# Patient Record
Sex: FEMALE | Race: WHITE | NOT HISPANIC OR LATINO | Employment: FULL TIME | URBAN - METROPOLITAN AREA
[De-identification: names, ages, dates, MRNs, and addresses within clinical notes are randomized per-mention and may not be internally consistent; named-entity substitution may affect disease eponyms.]

---

## 2021-02-06 ENCOUNTER — OFFICE VISIT (OUTPATIENT)
Dept: FAMILY MEDICINE CLINIC | Facility: CLINIC | Age: 51
End: 2021-02-06

## 2021-02-06 VITALS
BODY MASS INDEX: 47.09 KG/M2 | OXYGEN SATURATION: 99 % | DIASTOLIC BLOOD PRESSURE: 88 MMHG | WEIGHT: 293 LBS | SYSTOLIC BLOOD PRESSURE: 148 MMHG | HEIGHT: 66 IN | HEART RATE: 83 BPM | TEMPERATURE: 98.2 F | RESPIRATION RATE: 22 BRPM

## 2021-02-06 DIAGNOSIS — I25.10 CARDIOVASCULAR DISEASE: Chronic | ICD-10-CM

## 2021-02-06 DIAGNOSIS — E06.3 HYPOTHYROIDISM DUE TO HASHIMOTO'S THYROIDITIS: Chronic | ICD-10-CM

## 2021-02-06 DIAGNOSIS — Z11.59 NEED FOR HEPATITIS C SCREENING TEST: ICD-10-CM

## 2021-02-06 DIAGNOSIS — F42.9 OBSESSIVE-COMPULSIVE DISORDER, UNSPECIFIED TYPE: Chronic | ICD-10-CM

## 2021-02-06 DIAGNOSIS — E28.2 PCOS (POLYCYSTIC OVARIAN SYNDROME): Chronic | ICD-10-CM

## 2021-02-06 DIAGNOSIS — Z00.00 ANNUAL PHYSICAL EXAM: ICD-10-CM

## 2021-02-06 DIAGNOSIS — Z00.00 ENCOUNTER FOR MEDICAL EXAMINATION TO ESTABLISH CARE: Primary | ICD-10-CM

## 2021-02-06 DIAGNOSIS — I10 ESSENTIAL HYPERTENSION: Chronic | ICD-10-CM

## 2021-02-06 DIAGNOSIS — E03.8 HYPOTHYROIDISM DUE TO HASHIMOTO'S THYROIDITIS: Chronic | ICD-10-CM

## 2021-02-06 DIAGNOSIS — Z12.31 ENCOUNTER FOR SCREENING MAMMOGRAM FOR MALIGNANT NEOPLASM OF BREAST: ICD-10-CM

## 2021-02-06 DIAGNOSIS — F32.A DEPRESSION, UNSPECIFIED DEPRESSION TYPE: Chronic | ICD-10-CM

## 2021-02-06 DIAGNOSIS — F41.9 ANXIETY: Chronic | ICD-10-CM

## 2021-02-06 DIAGNOSIS — E66.01 MORBIDLY OBESE (HCC): ICD-10-CM

## 2021-02-06 DIAGNOSIS — K57.90 DIVERTICULOSIS: Chronic | ICD-10-CM

## 2021-02-06 LAB
BILIRUB BLD-MCNC: NEGATIVE MG/DL
CLARITY, POC: CLEAR
COLOR UR: YELLOW
EXPIRATION DATE: ABNORMAL
GLUCOSE UR STRIP-MCNC: NEGATIVE MG/DL
KETONES UR QL: NEGATIVE
LEUKOCYTE EST, POC: NEGATIVE
Lab: 5042
NITRITE UR-MCNC: NEGATIVE MG/ML
PH UR: 5.5 [PH] (ref 5–8)
PROT UR STRIP-MCNC: ABNORMAL MG/DL
RBC # UR STRIP: ABNORMAL /UL
SP GR UR: 1.02 (ref 1–1.03)
UROBILINOGEN UR QL: NORMAL

## 2021-02-06 PROCEDURE — 85025 COMPLETE CBC W/AUTO DIFF WBC: CPT | Performed by: NURSE PRACTITIONER

## 2021-02-06 PROCEDURE — 81003 URINALYSIS AUTO W/O SCOPE: CPT | Performed by: NURSE PRACTITIONER

## 2021-02-06 PROCEDURE — 80053 COMPREHEN METABOLIC PANEL: CPT | Performed by: NURSE PRACTITIONER

## 2021-02-06 PROCEDURE — 83036 HEMOGLOBIN GLYCOSYLATED A1C: CPT | Performed by: NURSE PRACTITIONER

## 2021-02-06 PROCEDURE — 80061 LIPID PANEL: CPT | Performed by: NURSE PRACTITIONER

## 2021-02-06 PROCEDURE — 86803 HEPATITIS C AB TEST: CPT | Performed by: NURSE PRACTITIONER

## 2021-02-06 PROCEDURE — 84443 ASSAY THYROID STIM HORMONE: CPT | Performed by: NURSE PRACTITIONER

## 2021-02-06 PROCEDURE — 99386 PREV VISIT NEW AGE 40-64: CPT | Performed by: NURSE PRACTITIONER

## 2021-02-06 RX ORDER — METFORMIN HYDROCHLORIDE 500 MG/5ML
SOLUTION ORAL
COMMUNITY
End: 2021-02-07

## 2021-02-06 RX ORDER — IBUPROFEN 800 MG/1
800 TABLET ORAL EVERY 6 HOURS PRN
COMMUNITY
End: 2022-03-18

## 2021-02-06 RX ORDER — ATORVASTATIN CALCIUM 40 MG/1
40 TABLET, FILM COATED ORAL DAILY
COMMUNITY
End: 2021-02-09 | Stop reason: SDUPTHER

## 2021-02-06 RX ORDER — VALSARTAN AND HYDROCHLOROTHIAZIDE 80; 12.5 MG/1; MG/1
1 TABLET, FILM COATED ORAL DAILY
COMMUNITY
Start: 2021-01-20 | End: 2021-02-09 | Stop reason: SDUPTHER

## 2021-02-06 RX ORDER — APIXABAN 5 MG/1
TABLET, FILM COATED ORAL
COMMUNITY
Start: 2021-01-20 | End: 2021-02-18 | Stop reason: SDUPTHER

## 2021-02-06 NOTE — PATIENT INSTRUCTIONS
Health Maintenance, Female  Adopting a healthy lifestyle and getting preventive care are important in promoting health and wellness. Ask your health care provider about:  · The right schedule for you to have regular tests and exams.  · Things you can do on your own to prevent diseases and keep yourself healthy.  What should I know about diet, weight, and exercise?  Eat a healthy diet    · Eat a diet that includes plenty of vegetables, fruits, low-fat dairy products, and lean protein.  · Do not eat a lot of foods that are high in solid fats, added sugars, or sodium.  Maintain a healthy weight  Body mass index (BMI) is used to identify weight problems. It estimates body fat based on height and weight. Your health care provider can help determine your BMI and help you achieve or maintain a healthy weight.  Get regular exercise  Get regular exercise. This is one of the most important things you can do for your health. Most adults should:  · Exercise for at least 150 minutes each week. The exercise should increase your heart rate and make you sweat (moderate-intensity exercise).  · Do strengthening exercises at least twice a week. This is in addition to the moderate-intensity exercise.  · Spend less time sitting. Even light physical activity can be beneficial.  Watch cholesterol and blood lipids  Have your blood tested for lipids and cholesterol at 20 years of age, then have this test every 5 years.  Have your cholesterol levels checked more often if:  · Your lipid or cholesterol levels are high.  · You are older than 40 years of age.  · You are at high risk for heart disease.  What should I know about cancer screening?  Depending on your health history and family history, you may need to have cancer screening at various ages. This may include screening for:  · Breast cancer.  · Cervical cancer.  · Colorectal cancer.  · Skin cancer.  · Lung cancer.  What should I know about heart disease, diabetes, and high blood  pressure?  Blood pressure and heart disease  · High blood pressure causes heart disease and increases the risk of stroke. This is more likely to develop in people who have high blood pressure readings, are of  descent, or are overweight.  · Have your blood pressure checked:  ? Every 3-5 years if you are 18-39 years of age.  ? Every year if you are 40 years old or older.  Diabetes  Have regular diabetes screenings. This checks your fasting blood sugar level. Have the screening done:  · Once every three years after age 40 if you are at a normal weight and have a low risk for diabetes.  · More often and at a younger age if you are overweight or have a high risk for diabetes.  What should I know about preventing infection?  Hepatitis B  If you have a higher risk for hepatitis B, you should be screened for this virus. Talk with your health care provider to find out if you are at risk for hepatitis B infection.  Hepatitis C  Testing is recommended for:  · Everyone born from 1945 through 1965.  · Anyone with known risk factors for hepatitis C.  Sexually transmitted infections (STIs)  · Get screened for STIs, including gonorrhea and chlamydia, if:  ? You are sexually active and are younger than 24 years of age.  ? You are older than 24 years of age and your health care provider tells you that you are at risk for this type of infection.  ? Your sexual activity has changed since you were last screened, and you are at increased risk for chlamydia or gonorrhea. Ask your health care provider if you are at risk.  · Ask your health care provider about whether you are at high risk for HIV. Your health care provider may recommend a prescription medicine to help prevent HIV infection. If you choose to take medicine to prevent HIV, you should first get tested for HIV. You should then be tested every 3 months for as long as you are taking the medicine.  Pregnancy  · If you are about to stop having your period (premenopausal) and  you may become pregnant, seek counseling before you get pregnant.  · Take 400 to 800 micrograms (mcg) of folic acid every day if you become pregnant.  · Ask for birth control (contraception) if you want to prevent pregnancy.  Osteoporosis and menopause  Osteoporosis is a disease in which the bones lose minerals and strength with aging. This can result in bone fractures. If you are 65 years old or older, or if you are at risk for osteoporosis and fractures, ask your health care provider if you should:  · Be screened for bone loss.  · Take a calcium or vitamin D supplement to lower your risk of fractures.  · Be given hormone replacement therapy (HRT) to treat symptoms of menopause.  Follow these instructions at home:  Lifestyle  · Do not use any products that contain nicotine or tobacco, such as cigarettes, e-cigarettes, and chewing tobacco. If you need help quitting, ask your health care provider.  · Do not use street drugs.  · Do not share needles.  · Ask your health care provider for help if you need support or information about quitting drugs.  Alcohol use  · Do not drink alcohol if:  ? Your health care provider tells you not to drink.  ? You are pregnant, may be pregnant, or are planning to become pregnant.  · If you drink alcohol:  ? Limit how much you use to 0-1 drink a day.  ? Limit intake if you are breastfeeding.  · Be aware of how much alcohol is in your drink. In the [place] one drink equals one 12 oz bottle of beer (355 mL), one 5 oz glass of wine (148 mL), or one 1½ oz glass of hard liquor (44 mL).  General instructions  · Schedule regular health, dental, and eye exams.  · Stay current with your vaccines.  · Tell your health care provider if:  ? You often feel depressed.  ? You have ever been abused or do not feel safe at home.  Summary  · Adopting a healthy lifestyle and getting preventive care are important in promoting health and wellness.  · Follow your health care provider's instructions about healthy  diet, exercising, and getting tested or screened for diseases.  · Follow your health care provider's instructions on monitoring your cholesterol and blood pressure.  This information is not intended to replace advice given to you by your health care provider. Make sure you discuss any questions you have with your health care provider.  Document Revised: 12/11/2019 Document Reviewed: 12/11/2019  [employer] Patient Education © 2020 [employer]    Preventive Care 40-64 Years Old, Female  Preventive care refers to visits with your health care provider and lifestyle choices that can promote health and wellness. This includes:  · A yearly physical exam. This may also be called an annual well check.  · Regular dental visits and eye exams.  · Immunizations.  · Screening for certain conditions.  · Healthy lifestyle choices, such as eating a healthy diet, getting regular exercise, not using drugs or products that contain nicotine and tobacco, and limiting alcohol use.  What can I expect for my preventive care visit?  Physical exam  Your health care provider will check your:  · Height and weight. This may be used to calculate body mass index (BMI), which tells if you are at a healthy weight.  · Heart rate and blood pressure.  · Skin for abnormal spots.  Counseling  Your health care provider may ask you questions about your:  · Alcohol, tobacco, and drug use.  · Emotional well-being.  · Home and relationship well-being.  · Sexual activity.  · Eating habits.  · Work and work environment.  · Method of birth control.  · Menstrual cycle.  · Pregnancy history.  What immunizations do I need?    Influenza (flu) vaccine  · This is recommended every year.  Tetanus, diphtheria, and pertussis (Tdap) vaccine  · You may need a Td booster every 10 years.  Varicella (chickenpox) vaccine  · You may need this if you have not been vaccinated.  Zoster (shingles) vaccine  · You may need this after age 60.  Measles, mumps, and rubella (MMR)  vaccine  · You may need at least one dose of MMR if you were born in 1957 or later. You may also need a second dose.  Pneumococcal conjugate (PCV13) vaccine  · You may need this if you have certain conditions and were not previously vaccinated.  Pneumococcal polysaccharide (PPSV23) vaccine  · You may need one or two doses if you smoke cigarettes or if you have certain conditions.  Meningococcal conjugate (MenACWY) vaccine  · You may need this if you have certain conditions.  Hepatitis A vaccine  · You may need this if you have certain conditions or if you travel or work in places where you may be exposed to hepatitis A.  Hepatitis B vaccine  · You may need this if you have certain conditions or if you travel or work in places where you may be exposed to hepatitis B.  Haemophilus influenzae type b (Hib) vaccine  · You may need this if you have certain conditions.  Human papillomavirus (HPV) vaccine  · If recommended by your health care provider, you may need three doses over 6 months.  You may receive vaccines as individual doses or as more than one vaccine together in one shot (combination vaccines). Talk with your health care provider about the risks and benefits of combination vaccines.  What tests do I need?  Blood tests  · Lipid and cholesterol levels. These may be checked every 5 years, or more frequently if you are over 50 years old.  · Hepatitis C test.  · Hepatitis B test.  Screening  · Lung cancer screening. You may have this screening every year starting at age 55 if you have a 30-pack-year history of smoking and currently smoke or have quit within the past 15 years.  · Colorectal cancer screening. All adults should have this screening starting at age 50 and continuing until age 75. Your health care provider may recommend screening at age 45 if you are at increased risk. You will have tests every 1-10 years, depending on your results and the type of screening test.  · Diabetes screening. This is done by  checking your blood sugar (glucose) after you have not eaten for a while (fasting). You may have this done every 1-3 years.  · Mammogram. This may be done every 1-2 years. Talk with your health care provider about when you should start having regular mammograms. This may depend on whether you have a family history of breast cancer.  · BRCA-related cancer screening. This may be done if you have a family history of breast, ovarian, tubal, or peritoneal cancers.  · Pelvic exam and Pap test. This may be done every 3 years starting at age 21. Starting at age 30, this may be done every 5 years if you have a Pap test in combination with an HPV test.  Other tests  · Sexually transmitted disease (STD) testing.  · Bone density scan. This is done to screen for osteoporosis. You may have this scan if you are at high risk for osteoporosis.  Follow these instructions at home:  Eating and drinking  · Eat a diet that includes fresh fruits and vegetables, whole grains, lean protein, and low-fat dairy.  · Take vitamin and mineral supplements as recommended by your health care provider.  · Do not drink alcohol if:  ? Your health care provider tells you not to drink.  ? You are pregnant, may be pregnant, or are planning to become pregnant.  · If you drink alcohol:  ? Limit how much you have to 0-1 drink a day.  ? Be aware of how much alcohol is in your drink. In the [place] one drink equals one 12 oz bottle of beer (355 mL), one 5 oz glass of wine (148 mL), or one 1½ oz glass of hard liquor (44 mL).  Lifestyle  · Take daily care of your teeth and gums.  · Stay active. Exercise for at least 30 minutes on 5 or more days each week.  · Do not use any products that contain nicotine or tobacco, such as cigarettes, e-cigarettes, and chewing tobacco. If you need help quitting, ask your health care provider.  · If you are sexually active, practice safe sex. Use a condom or other form of birth control (contraception) in order to prevent pregnancy  and STIs (sexually transmitted infections).  · If told by your health care provider, take low-dose aspirin daily starting at age 50.  What's next?  · Visit your health care provider once a year for a well check visit.  · Ask your health care provider how often you should have your eyes and teeth checked.  · Stay up to date on all vaccines.  This information is not intended to replace advice given to you by your health care provider. Make sure you discuss any questions you have with your health care provider.  Document Revised: 08/29/2019 Document Reviewed: 08/29/2019  [employer] Patient Education © 2020 [employer]    Obesity, Adult  Obesity is the condition of having too much total body fat. Being overweight or obese means that your weight is greater than what is considered healthy for your body size. Obesity is determined by a measurement called BMI. BMI is an estimate of body fat and is calculated from height and weight. For adults, a BMI of 30 or higher is considered obese.  Obesity can lead to other health concerns and major illnesses, including:  · Stroke.  · Coronary artery disease (CAD).  · Type 2 diabetes.  · Some types of cancer, including cancers of the colon, breast, uterus, and gallbladder.  · Osteoarthritis.  · High blood pressure (hypertension).  · High cholesterol.  · Sleep apnea.  · Gallbladder stones.  · Infertility problems.  What are the causes?  Common causes of this condition include:  · Eating daily meals that are high in calories, sugar, and fat.  · Being born with genes that may make you more likely to become obese.  · Having a medical condition that causes obesity, including:  ? Hypothyroidism.  ? Polycystic ovarian syndrome (PCOS).  ? Binge-eating disorder.  ? Cushing syndrome.  · Taking certain medicines, such as steroids, antidepressants, and seizure medicines.  · Not being physically active (sedentary lifestyle).  · Not getting enough sleep.  · Drinking high amounts of sugar-sweetened  beverages, such as soft drinks.  What increases the risk?  The following factors may make you more likely to develop this condition:  · Having a family history of obesity.  · Being a woman of  descent.  · Being a man of  descent.  · Living in an area with limited access to:  ? [place], recreation centers, or sidewalks.  ? Healthy food choices, such as grocery stores and farmers' markets.  What are the signs or symptoms?  The main sign of this condition is having too much body fat.  How is this diagnosed?  This condition is diagnosed based on:  · Your BMI. If you are an adult with a BMI of 30 or higher, you are considered obese.  · Your waist circumference. This measures the distance around your waistline.  · Your skinfold thickness. Your health care provider may gently pinch a fold of your skin and measure it.  You may have other tests to check for underlying conditions.  How is this treated?  Treatment for this condition often includes changing your lifestyle. Treatment may include some or all of the following:  · Dietary changes. This may include developing a healthy meal plan.  · Regular physical activity. This may include activity that causes your heart to beat faster (aerobic exercise) and strength training. Work with your health care provider to design an exercise program that works for you.  · Medicine to help you lose weight if you are unable to lose 1 pound a week after 6 weeks of healthy eating and more physical activity.  · Treating conditions that cause the obesity (underlying conditions).  · Surgery. Surgical options may include gastric banding and gastric bypass. Surgery may be done if:  ? Other treatments have not helped to improve your condition.  ? You have a BMI of 40 or higher.  ? You have life-threatening health problems related to obesity.  Follow these instructions at home:  Eating and drinking    · Follow recommendations from your health care provider about what you eat  and drink. Your health care provider may advise you to:  ? Limit fast food, sweets, and processed snack foods.  ? Choose low-fat options, such as low-fat milk instead of whole milk.  ? Eat 5 or more servings of fruits or vegetables every day.  ? Eat at home more often. This gives you more control over what you eat.  ? Choose healthy foods when you eat out.  ? Learn to read food labels. This will help you understand how much food is considered 1 serving.  ? Learn what a healthy serving size is.  ? Keep low-fat snacks available.  ? Limit sugary drinks, such as soda, fruit juice, sweetened iced tea, and flavored milk.  · Drink enough water to keep your urine pale yellow.  · Do not follow a fad diet. Fad diets can be unhealthy and even dangerous.  Physical activity  · Exercise regularly, as told by your health care provider.  ? Most adults should get up to 150 minutes of moderate-intensity exercise every week.  ? Ask your health care provider what types of exercise are safe for you and how often you should exercise.  · Warm up and stretch before being active.  · Cool down and stretch after being active.  · Rest between periods of activity.  Lifestyle  · Work with your health care provider and a dietitian to set a weight-loss goal that is healthy and reasonable for you.  · Limit your screen time.  · Find ways to reward yourself that do not involve food.  · Do not drink alcohol if:  ? Your health care provider tells you not to drink.  ? You are pregnant, may be pregnant, or are planning to become pregnant.  · If you drink alcohol:  ? Limit how much you use to:  § 0-1 drink a day for women.  § 0-2 drinks a day for men.  ? Be aware of how much alcohol is in your drink. In the [place] one drink equals one 12 oz bottle of beer (355 mL), one 5 oz glass of wine (148 mL), or one 1½ oz glass of hard liquor (44 mL).  General instructions  · Keep a weight-loss journal to keep track of the food you eat and how much exercise you  get.  · Take over-the-counter and prescription medicines only as told by your health care provider.  · Take vitamins and supplements only as told by your health care provider.  · Consider joining a support group. Your health care provider may be able to recommend a support group.  · Keep all follow-up visits as told by your health care provider. This is important.  Contact a health care provider if:  · You are unable to meet your weight loss goal after 6 weeks of dietary and lifestyle changes.  Get help right away if you are having:  · Trouble breathing.  · Suicidal thoughts or behaviors.  Summary  · Obesity is the condition of having too much total body fat.  · Being overweight or obese means that your weight is greater than what is considered healthy for your body size.  · Work with your health care provider and a dietitian to set a weight-loss goal that is healthy and reasonable for you.  · Exercise regularly, as told by your health care provider. Ask your health care provider what types of exercise are safe for you and how often you should exercise.  This information is not intended to replace advice given to you by your health care provider. Make sure you discuss any questions you have with your health care provider.  Document Revised: 08/22/2019 Document Reviewed: 08/22/2019  [employer] Patient Education © 2020 [employer]    Deep Vein Thrombosis    Deep vein thrombosis (DVT) is a condition in which a blood clot forms in a deep vein, such as a lower leg, thigh, or arm vein. A clot is blood that has thickened into a gel or solid. This condition is dangerous. It can lead to serious and even life-threatening complications if the clot travels to the lungs and causes a blockage (pulmonary embolism). It can also damage veins in the leg. This can result in leg pain, swelling, discoloration, and sores (post-thrombotic syndrome).  What are the causes?  This condition may be caused by:  · A slowdown of blood  flow.  · Damage to a vein.  · A condition that causes blood to clot more easily, such as an inherited clotting disorder.  What increases the risk?  The following factors may make you more likely to develop this condition:  · Being overweight.  · Being older, especially over age 60.  · Sitting or lying down for more than four hours.  · Being in the hospital.  · Lack of physical activity (sedentary lifestyle).  · Pregnancy, being in childbirth, or having recently given birth.  · Taking medicines that contain estrogen, such as medicines to prevent pregnancy.  · Smoking.  · A history of any of the following:  ? Blood clots or a blood clotting disease.  ? Peripheral vascular disease.  ? Inflammatory bowel disease.  ? Cancer.  ? Heart disease.  ? Genetic conditions that affect how your blood clots, such as Factor V Leiden mutation.  ? Neurological diseases that affect your legs (leg paresis).  ? A recent injury, such as a car accident.  ? Major or lengthy surgery.  ? A central line placed inside a large vein.  What are the signs or symptoms?  Symptoms of this condition include:  · Swelling, pain, or tenderness in an arm or leg.  · Warmth, redness, or discoloration in an arm or leg.  If the clot is in your leg, symptoms may be more noticeable or worse when you stand or walk. Some people may not develop any symptoms.  How is this diagnosed?  This condition is diagnosed with:  · A medical history and physical exam.  · Tests, such as:  ? Blood tests. These are done to check how well your blood clots.  ? Ultrasound. This is done to check for clots.  ? Venogram. For this test, contrast dye is injected into a vein and X-rays are taken to check for any clots.  How is this treated?  Treatment for this condition depends on:  · The cause of your DVT.  · Your risk for bleeding or developing more clots.  · Any other medical conditions that you have.  Treatment may include:  · Taking a blood thinner (anticoagulant). This type of  medicine prevents clots from forming. It may be taken by mouth, injected under the skin, or injected through an IV (catheter).  · Injecting clot-dissolving medicines into the affected vein (catheter-directed thrombolysis).  · Having surgery. Surgery may be done to:  ? Remove the clot.  ? Place a filter in a large vein to catch blood clots before they reach the lungs.  Some treatments may be continued for up to six months.  Follow these instructions at home:  If you are taking blood thinners:  · Take the medicine exactly as told by your health care provider. Some blood thinners need to be taken at the same time every day. Do not skip a dose.  · Talk with your health care provider before you take any medicines that contain aspirin or NSAIDs. These medicines increase your risk for dangerous bleeding.  · Ask your health care provider about foods and drugs that could change the way the medicine works (may interact). Avoid those things if your health care provider tells you to do so.  · Blood thinners can cause easy bruising and may make it difficult to stop bleeding. Because of this:  ? Be very careful when using knives, scissors, or other sharp objects.  ? Use an electric razor instead of a blade.  ? Avoid activities that could cause injury or bruising, and follow instructions about how to prevent falls.  · Wear a medical alert bracelet or carry a card that lists what medicines you take.  General instructions  · Take over-the-counter and prescription medicines only as told by your health care provider.  · Return to your normal activities as told by your health care provider. Ask your health care provider what activities are safe for you.  · Wear compression stockings if recommended by your health care provider.  · Keep all follow-up visits as told by your health care provider. This is important.  How is this prevented?  To lower your risk of developing this condition again:  · For 30 or more minutes every day, do an  activity that:  ? Involves moving your arms and legs.  ? Increases your heart rate.  · When traveling for longer than four hours:  ? Exercise your arms and legs every hour.  ? Drink plenty of water.  ? Avoid drinking alcohol.  · Avoid sitting or lying for a long time without moving your legs.  · If you have surgery or you are hospitalized, ask about ways to prevent blood clots. These may include taking frequent walks or using anticoagulants.  · Stay at a healthy weight.  · If you are a woman who is older than age 35, avoid unnecessary use of medicines that contain estrogen, such as some birth control pills.  · Do not use any products that contain nicotine or tobacco, such as cigarettes and e-cigarettes. This is especially important if you take estrogen medicines. If you need help quitting, ask your health care provider.  Contact a health care provider if:  · You miss a dose of your blood thinner.  · Your menstrual period is heavier than usual.  · You have unusual bruising.  Get help right away if:  · You have:  ? New or increased pain, swelling, or redness in an arm or leg.  ? Numbness or tingling in an arm or leg.  ? Shortness of breath.  ? Chest pain.  ? A rapid or irregular heartbeat.  ? A severe headache or confusion.  ? A cut that will not stop bleeding.  · There is blood in your vomit, stool, or urine.  · You have a serious fall or accident, or you hit your head.  · You feel light-headed or dizzy.  · You cough up blood.  These symptoms may represent a serious problem that is an emergency. Do not wait to see if the symptoms will go away. Get medical help right away. Call your local emergency services (911 in the [place] Do not drive yourself to the hospital.  Summary  · Deep vein thrombosis (DVT) is a condition in which a blood clot forms in a deep vein, such as a lower leg, thigh, or arm vein.  · Symptoms can include swelling, warmth, pain, and redness in your leg or arm.  · This condition may be treated with a  "blood thinner (anticoagulant medicine), medicine that is injected to dissolve blood clots,compression stockings, or surgery.  · If you are prescribed blood thinners, take them exactly as told.  This information is not intended to replace advice given to you by your health care provider. Make sure you discuss any questions you have with your health care provider.  Document Revised: 11/30/2018 Document Reviewed: 05/18/2018  [employer] Patient Education © 2020 [employer]    Hypertension, Adult  High blood pressure (hypertension) is when the force of blood pumping through the arteries is too strong. The arteries are the blood vessels that carry blood from the heart throughout the body. Hypertension forces the heart to work harder to pump blood and may cause arteries to become narrow or stiff. Untreated or uncontrolled hypertension can cause a heart attack, heart failure, a stroke, kidney disease, and other problems.  A blood pressure reading consists of a higher number over a lower number. Ideally, your blood pressure should be below 120/80. The first (\"top\") number is called the systolic pressure. It is a measure of the pressure in your arteries as your heart beats. The second (\"bottom\") number is called the diastolic pressure. It is a measure of the pressure in your arteries as the heart relaxes.  What are the causes?  The exact cause of this condition is not known. There are some conditions that result in or are related to high blood pressure.  What increases the risk?  Some risk factors for high blood pressure are under your control. The following factors may make you more likely to develop this condition:  · Smoking.  · Having type 2 diabetes mellitus, high cholesterol, or both.  · Not getting enough exercise or physical activity.  · Being overweight.  · Having too much fat, sugar, calories, or salt (sodium) in your diet.  · Drinking too much alcohol.  Some risk factors for high blood pressure may be difficult or " impossible to change. Some of these factors include:  · Having chronic kidney disease.  · Having a family history of high blood pressure.  · Age. Risk increases with age.  · Race. You may be at higher risk if you are .  · Gender. Men are at higher risk than women before age 45. After age 65, women are at higher risk than men.  · Having obstructive sleep apnea.  · Stress.  What are the signs or symptoms?  High blood pressure may not cause symptoms. Very high blood pressure (hypertensive crisis) may cause:  · Headache.  · Anxiety.  · Shortness of breath.  · Nosebleed.  · Nausea and vomiting.  · Vision changes.  · Severe chest pain.  · Seizures.  How is this diagnosed?  This condition is diagnosed by measuring your blood pressure while you are seated, with your arm resting on a flat surface, your legs uncrossed, and your feet flat on the floor. The cuff of the blood pressure monitor will be placed directly against the skin of your upper arm at the level of your heart. It should be measured at least twice using the same arm. Certain conditions can cause a difference in blood pressure between your right and left arms.  Certain factors can cause blood pressure readings to be lower or higher than normal for a short period of time:  · When your blood pressure is higher when you are in a health care provider's office than when you are at home, this is called white coat hypertension. Most people with this condition do not need medicines.  · When your blood pressure is higher at home than when you are in a health care provider's office, this is called masked hypertension. Most people with this condition may need medicines to control blood pressure.  If you have a high blood pressure reading during one visit or you have normal blood pressure with other risk factors, you may be asked to:  · Return on a different day to have your blood pressure checked again.  · Monitor your blood pressure at home for 1 week or  longer.  If you are diagnosed with hypertension, you may have other blood or imaging tests to help your health care provider understand your overall risk for other conditions.  How is this treated?  This condition is treated by making healthy lifestyle changes, such as eating healthy foods, exercising more, and reducing your alcohol intake. Your health care provider may prescribe medicine if lifestyle changes are not enough to get your blood pressure under control, and if:  · Your systolic blood pressure is above 130.  · Your diastolic blood pressure is above 80.  Your personal target blood pressure may vary depending on your medical conditions, your age, and other factors.  Follow these instructions at home:  Eating and drinking    · Eat a diet that is high in fiber and potassium, and low in sodium, added sugar, and fat. An example eating plan is called the DASH (Dietary Approaches to Stop Hypertension) diet. To eat this way:  ? Eat plenty of fresh fruits and vegetables. Try to fill one half of your plate at each meal with fruits and vegetables.  ? Eat whole grains, such as whole-wheat pasta, brown rice, or whole-grain bread. Fill about one fourth of your plate with whole grains.  ? Eat or drink low-fat dairy products, such as skim milk or low-fat yogurt.  ? Avoid fatty cuts of meat, processed or cured meats, and poultry with skin. Fill about one fourth of your plate with lean proteins, such as fish, chicken without skin, beans, eggs, or tofu.  ? Avoid pre-made and processed foods. These tend to be higher in sodium, added sugar, and fat.  · Reduce your daily sodium intake. Most people with hypertension should eat less than 1,500 mg of sodium a day.  · Do not drink alcohol if:  ? Your health care provider tells you not to drink.  ? You are pregnant, may be pregnant, or are planning to become pregnant.  · If you drink alcohol:  ? Limit how much you use to:  § 0-1 drink a day for women.  § 0-2 drinks a day for  men.  ? Be aware of how much alcohol is in your drink. In the [place] one drink equals one 12 oz bottle of beer (355 mL), one 5 oz glass of wine (148 mL), or one 1½ oz glass of hard liquor (44 mL).  Lifestyle    · Work with your health care provider to maintain a healthy body weight or to lose weight. Ask what an ideal weight is for you.  · Get at least 30 minutes of exercise most days of the week. Activities may include walking, swimming, or biking.  · Include exercise to strengthen your muscles (resistance exercise), such as Pilates or lifting weights, as part of your weekly exercise routine. Try to do these types of exercises for 30 minutes at least 3 days a week.  · Do not use any products that contain nicotine or tobacco, such as cigarettes, e-cigarettes, and chewing tobacco. If you need help quitting, ask your health care provider.  · Monitor your blood pressure at home as told by your health care provider.  · Keep all follow-up visits as told by your health care provider. This is important.  Medicines  · Take over-the-counter and prescription medicines only as told by your health care provider. Follow directions carefully. Blood pressure medicines must be taken as prescribed.  · Do not skip doses of blood pressure medicine. Doing this puts you at risk for problems and can make the medicine less effective.  · Ask your health care provider about side effects or reactions to medicines that you should watch for.  Contact a health care provider if you:  · Think you are having a reaction to a medicine you are taking.  · Have headaches that keep coming back (recurring).  · Feel dizzy.  · Have swelling in your ankles.  · Have trouble with your vision.  Get help right away if you:  · Develop a severe headache or confusion.  · Have unusual weakness or numbness.  · Feel faint.  · Have severe pain in your chest or abdomen.  · Vomit repeatedly.  · Have trouble breathing.  Summary  · Hypertension is when the force of blood  pumping through your arteries is too strong. If this condition is not controlled, it may put you at risk for serious complications.  · Your personal target blood pressure may vary depending on your medical conditions, your age, and other factors. For most people, a normal blood pressure is less than 120/80.  · Hypertension is treated with lifestyle changes, medicines, or a combination of both. Lifestyle changes include losing weight, eating a healthy, low-sodium diet, exercising more, and limiting alcohol.  This information is not intended to replace advice given to you by your health care provider. Make sure you discuss any questions you have with your health care provider.  Document Revised: 08/28/2019 Document Reviewed: 08/28/2019  [employer] Patient Education © 2020 [employer]    Hypothyroidism    Hypothyroidism is when the thyroid gland does not make enough of certain hormones (it is underactive). The thyroid gland is a small gland located in the lower front part of the neck, just in front of the windpipe (trachea). This gland makes hormones that help control how the body uses food for energy (metabolism) as well as how the heart and brain function. These hormones also play a role in keeping your bones strong. When the thyroid is underactive, it produces too little of the hormones thyroxine (T4) and triiodothyronine (T3).  What are the causes?  This condition may be caused by:  · Hashimoto's disease. This is a disease in which the body's disease-fighting system (immune system) attacks the thyroid gland. This is the most common cause.  · Viral infections.  · Pregnancy.  · Certain medicines.  · Birth defects.  · Past radiation treatments to the head or neck for cancer.  · Past treatment with radioactive iodine.  · Past exposure to radiation in the environment.  · Past surgical removal of part or all of the thyroid.  · Problems with a gland in the center of the brain (pituitary gland).  · Lack of enough iodine in  the diet.  What increases the risk?  You are more likely to develop this condition if:  · You are female.  · You have a family history of thyroid conditions.  · You use a medicine called lithium.  · You take medicines that affect the immune system (immunosuppressants).  What are the signs or symptoms?  Symptoms of this condition include:  · Feeling as though you have no energy (lethargy).  · Not being able to tolerate cold.  · Weight gain that is not explained by a change in diet or exercise habits.  · Lack of appetite.  · Dry skin.  · Coarse hair.  · Menstrual irregularity.  · Slowing of thought processes.  · Constipation.  · Sadness or depression.  How is this diagnosed?  This condition may be diagnosed based on:  · Your symptoms, your medical history, and a physical exam.  · Blood tests.  You may also have imaging tests, such as an ultrasound or MRI.  How is this treated?  This condition is treated with medicine that replaces the thyroid hormones that your body does not make. After you begin treatment, it may take several weeks for symptoms to go away.  Follow these instructions at home:  · Take over-the-counter and prescription medicines only as told by your health care provider.  · If you start taking any new medicines, tell your health care provider.  · Keep all follow-up visits as told by your health care provider. This is important.  ? As your condition improves, your dosage of thyroid hormone medicine may change.  ? You will need to have blood tests regularly so that your health care provider can monitor your condition.  Contact a health care provider if:  · Your symptoms do not get better with treatment.  · You are taking thyroid replacement medicine and you:  ? Sweat a lot.  ? Have tremors.  ? Feel anxious.  ? Lose weight rapidly.  ? Cannot tolerate heat.  ? Have emotional swings.  ? Have diarrhea.  ? Feel weak.  Get help right away if you have:  · Chest pain.  · An irregular heartbeat.  · A rapid  heartbeat.  · Difficulty breathing.  Summary  · Hypothyroidism is when the thyroid gland does not make enough of certain hormones (it is underactive).  · When the thyroid is underactive, it produces too little of the hormones thyroxine (T4) and triiodothyronine (T3).  · The most common cause is Hashimoto's disease, a disease in which the body's disease-fighting system (immune system) attacks the thyroid gland. The condition can also be caused by viral infections, medicine, pregnancy, or past radiation treatment to the head or neck.  · Symptoms may include weight gain, dry skin, constipation, feeling as though you do not have energy, and not being able to tolerate cold.  · This condition is treated with medicine to replace the thyroid hormones that your body does not make.  This information is not intended to replace advice given to you by your health care provider. Make sure you discuss any questions you have with your health care provider.  Document Revised: 11/30/2018 Document Reviewed: 11/28/2018  [employer] Patient Education © 2020 [employer]    Sleep Apnea  Sleep apnea is a condition in which breathing pauses or becomes shallow during sleep. Episodes of sleep apnea usually last 10 seconds or longer, and they may occur as many as 20 times an hour. Sleep apnea disrupts your sleep and keeps your body from getting the rest that it needs. This condition can increase your risk of certain health problems, including:  · Heart attack.  · Stroke.  · Obesity.  · Diabetes.  · Heart failure.  · Irregular heartbeat.  What are the causes?  There are three kinds of sleep apnea:  · Obstructive sleep apnea. This kind is caused by a blocked or collapsed airway.  · Central sleep apnea. This kind happens when the part of the brain that controls breathing does not send the correct signals to the muscles that control breathing.  · Mixed sleep apnea. This is a combination of obstructive and central sleep apnea.  The most common cause  of this condition is a collapsed or blocked airway. An airway can collapse or become blocked if:  · Your throat muscles are abnormally relaxed.  · Your tongue and tonsils are larger than normal.  · You are overweight.  · Your airway is smaller than normal.  What increases the risk?  You are more likely to develop this condition if you:  · Are overweight.  · Smoke.  · Have a smaller than normal airway.  · Are elderly.  · Are male.  · Drink alcohol.  · Take sedatives or tranquilizers.  · Have a family history of sleep apnea.  What are the signs or symptoms?  Symptoms of this condition include:  · Trouble staying asleep.  · Daytime sleepiness and tiredness.  · Irritability.  · Loud snoring.  · Morning headaches.  · Trouble concentrating.  · Forgetfulness.  · Decreased interest in sex.  · Unexplained sleepiness.  · Mood swings.  · Personality changes.  · Feelings of depression.  · Waking up often during the night to urinate.  · Dry mouth.  · Sore throat.  How is this diagnosed?  This condition may be diagnosed with:  · A medical history.  · A physical exam.  · A series of tests that are done while you are sleeping (sleep study). These tests are usually done in a sleep lab, but they may also be done at home.  How is this treated?  Treatment for this condition aims to restore normal breathing and to ease symptoms during sleep. It may involve managing health issues that can affect breathing, such as high blood pressure or obesity. Treatment may include:  · Sleeping on your side.  · Using a decongestant if you have nasal congestion.  · Avoiding the use of depressants, including alcohol, sedatives, and narcotics.  · Losing weight if you are overweight.  · Making changes to your diet.  · Quitting smoking.  · Using a device to open your airway while you sleep, such as:  ? An oral appliance. This is a custom-made mouthpiece that shifts your lower jaw forward.  ? A continuous positive airway pressure (CPAP) device. This device  blows air through a mask when you breathe out (exhale).  ? A nasal expiratory positive airway pressure (EPAP) device. This device has valves that you put into each nostril.  ? A bi-level positive airway pressure (BPAP) device. This device blows air through a mask when you breathe in (inhale) and breathe out (exhale).  · Having surgery if other treatments do not work. During surgery, excess tissue is removed to create a wider airway.  It is important to get treatment for sleep apnea. Without treatment, this condition can lead to:  · High blood pressure.  · Coronary artery disease.  · In men, an inability to achieve or maintain an erection (impotence).  · Reduced thinking abilities.  Follow these instructions at home:  Lifestyle  · Make any lifestyle changes that your health care provider recommends.  · Eat a healthy, well-balanced diet.  · Take steps to lose weight if you are overweight.  · Avoid using depressants, including alcohol, sedatives, and narcotics.  · Do not use any products that contain nicotine or tobacco, such as cigarettes, e-cigarettes, and chewing tobacco. If you need help quitting, ask your health care provider.  General instructions  · Take over-the-counter and prescription medicines only as told by your health care provider.  · If you were given a device to open your airway while you sleep, use it only as told by your health care provider.  · If you are having surgery, make sure to tell your health care provider you have sleep apnea. You may need to bring your device with you.  · Keep all follow-up visits as told by your health care provider. This is important.  Contact a health care provider if:  · The device that you received to open your airway during sleep is uncomfortable or does not seem to be working.  · Your symptoms do not improve.  · Your symptoms get worse.  Get help right away if:  · You develop:  ? Chest pain.  ? Shortness of breath.  ? Discomfort in your back, arms, or  stomach.  · You have:  ? Trouble speaking.  ? Weakness on one side of your body.  ? Drooping in your face.  These symptoms may represent a serious problem that is an emergency. Do not wait to see if the symptoms will go away. Get medical help right away. Call your local emergency services (911 in the [place] Do not drive yourself to the hospital.  Summary  · Sleep apnea is a condition in which breathing pauses or becomes shallow during sleep.  · The most common cause is a collapsed or blocked airway.  · The goal of treatment is to restore normal breathing and to ease symptoms during sleep.  This information is not intended to replace advice given to you by your health care provider. Make sure you discuss any questions you have with your health care provider.  Document Revised: 06/03/2020 Document Reviewed: 08/13/2019  [employer] Patient Education © 2020 [employer]    Venous Thromboembolism Prevention  Venous thromboembolism (VTE) is a condition in which a blood clot (thrombus) develops in the body. A deep vein thrombosis (DVT) is a blood clot that usually occurs in a deep vein in the leg or the pelvis, but it can also occur in the arm.  Sometimes, pieces of a thrombus can break off and travel through the bloodstream to other parts of the body. When that happens, the thrombus is called an embolus. An embolus that travels to the lungs is called a pulmonary embolism. An embolism can block the blood flow in the blood vessels of other organs as well.  How can this condition affect me?  VTE is a serious health condition that can cause disability or death. It is very important to get help right away. Do not ignore your symptoms.  What can increase my risk?  You are more likely to develop this condition if you:  · Have had recent major surgery or trauma.  · Have certain health conditions, such as cancer.  · Are in the hospital for a sudden illness.  · Have not moved for a long period of time, such as if you are on bed rest  or traveling a long distance.  · Take certain medicines, such as birth control pills or hormone replacement therapy.  · Are pregnant or recently gave birth.  · Have a personal or family history of VTE.  · Are overweight.  · Are 60 years of age or older.  · Use products that contain nicotine and tobacco.  What actions can I take to prevent this?  In the hospital  A VTE may be prevented by taking medicines that are prescribed to prevent blood clots (anticoagulants). You can also help to prevent VTE while in the hospital by taking these actions:  · Get out of bed and walk. Ask your health care provider if this is safe for you to do.  · Ask your health care provider if you should use a sequential compression device (SCD). This is a machine that pumps air into compression sleeves that are wrapped around your legs.  · Ask your health care provider if you should wear tight, elastic stockings that apply pressure to the lower legs (compression stockings). Compression stockings are sometimes used with SCDs.  At home after surgery  Understand that you have an increased risk for VTE for the first 4-6 weeks after surgery. During this time:  · Avoid traveling for more than 4 hours. If you must travel after surgery, ask your health care provider about additional preventive actions that you can take.  · Avoid sitting or lying still for too long. If possible, get up and walk around one time every hour. Ask your health care provider when this is safe for you to do.  While traveling  Travel that takes more than 4 hours can increase the risk of a VTE. To prevent VTE when traveling:  · Exercise your arms and legs every hour. You can do this by standing, stretching, and bending and straightening your arms and legs. If you are traveling by airplane, train, or bus, walk up and down the aisle as often as possible to get your blood moving. If you are traveling by car, stop and get out of the car every hour to exercise your arms and legs and  stretch. Other types of exercise might include:  ? Keeping your feet flat on the ground and raising your toes.  ? Switching from tightening the muscles in your calves and thighs to relaxing those same muscles while you are sitting.  ? Pointing and flexing your feet at the ankle joints while you are sitting.  · Drink enough water to keep your urine pale yellow.  · Avoid drinking alcohol during long travel.  Generally, it is not recommended that you take medicines to prevent DVT during routine travel.  Activity    · Stay active. Avoid sitting or lying in bed for long periods of time without moving your arms and legs.  · Exercise by moving your arms and legs for 30 minutes or more every day.  · Try not to bump or injure your legs.  · Avoid crossing your legs when you are sitting.  Lifestyle  · Do not use any products that contain nicotine or tobacco, such as cigarettes, e-cigarettes, and chewing tobacco. If you need help quitting, ask your health care provider. This is especially important if you take estrogen medicines.  · Avoid wearing tight clothing around your legs or waist.  General information    · Wear compression stockings as told by your health care provider. These stockings help to prevent blood clots and reduce swelling in your legs. Do not let them bunch up when you are wearing them.  · Avoid using medicines that contain estrogen if you do not need them. These include birth control pills and hormone replacement therapy.  · Do not use pillows under your knees while lying down unless told by your health care provider.  · Maintain a healthy weight.  Where to find more information  · Centers for Disease Control and Prevention: www.cdc.gov  · American Heart Association: www.heart.org  Get help right away if:  · You have chest pain or shortness of breath.  · You cough up blood.  · You have a rapid or irregular heartbeat.  · You feel light-headed or dizzy.  · You have new or increased pain, swelling, or redness in  an arm or leg.  · You have numbness or tingling in an arm or leg.  · You have blood in your vomit, stool, or urine.  These symptoms may represent a serious problem that is an emergency. Do not wait to see if the symptoms will go away. Get medical help right away. Call your local emergency services (911 in the [place] Do not drive yourself to the hospital.  Summary  · Venous thromboembolism (VTE) is a condition in which a blood clot (thrombus)develops in the body. A deep vein thrombosis (DVT) is a blood clot that usually occurs in a deep vein in the leg or the pelvis, but it can also occur in the arm.  · VTE is a serious health condition that can cause disability or death. It is very important to get help right away. Do not ignore your symptoms.  · If you are traveling, exercise your arms and legs every hour.  · Stay active to help prevent blood clots. Avoid sitting or lying in bed for long periods of time without moving your arms and legs.  This information is not intended to replace advice given to you by your health care provider. Make sure you discuss any questions you have with your health care provider.  Document Revised: 04/07/2020 Document Reviewed: 02/25/2020  [employer] Patient Education © 2020 [employer]    Diverticulosis    Diverticulosis is a condition that develops when small pouches (diverticula) form in the wall of the large intestine (colon). The colon is where water is absorbed and stool (feces) is formed. The pouches form when the inside layer of the colon pushes through weak spots in the outer layers of the colon. You may have a few pouches or many of them.  The pouches usually do not cause problems unless they become inflamed or infected. When this happens, the condition is called diverticulitis.  What are the causes?  The cause of this condition is not known.  What increases the risk?  The following factors may make you more likely to develop this condition:  · Being older than age 60. Your risk  for this condition increases with age. Diverticulosis is rare among people younger than age 30. By age 80, many people have it.  · Eating a low-fiber diet.  · Having frequent constipation.  · Being overweight.  · Not getting enough exercise.  · Smoking.  · Taking over-the-counter pain medicines, like aspirin and ibuprofen.  · Having a family history of diverticulosis.  What are the signs or symptoms?  In most people, there are no symptoms of this condition. If you do have symptoms, they may include:  · Bloating.  · Cramps in the abdomen.  · Constipation or diarrhea.  · Pain in the lower left side of the abdomen.  How is this diagnosed?  Because diverticulosis usually has no symptoms, it is most often diagnosed during an exam for other colon problems. The condition may be diagnosed by:  · Using a flexible scope to examine the colon (colonoscopy).  · Taking an X-ray of the colon after dye has been put into the colon (barium enema).  · Having a CT scan.  How is this treated?  You may not need treatment for this condition. Your health care provider may recommend treatment to prevent problems. You may need treatment if you have symptoms or if you previously had diverticulitis. Treatment may include:  · Eating a high-fiber diet.  · Taking a fiber supplement.  · Taking a live bacteria supplement (probiotic).  · Taking medicine to relax your colon.  Follow these instructions at home:  Medicines  · Take over-the-counter and prescription medicines only as told by your health care provider.  · If told by your health care provider, take a fiber supplement or probiotic.  Constipation prevention  Your condition may cause constipation. To prevent or treat constipation, you may need to:  · Drink enough fluid to keep your urine pale yellow.  · Take over-the-counter or prescription medicines.  · Eat foods that are high in fiber, such as beans, whole grains, and fresh fruits and vegetables.  · Limit foods that are high in fat and  processed sugars, such as fried or sweet foods.    General instructions  · Try not to strain when you have a bowel movement.  · Keep all follow-up visits as told by your health care provider. This is important.  Contact a health care provider if you:  · Have pain in your abdomen.  · Have bloating.  · Have cramps.  · Have not had a bowel movement in 3 days.  Get help right away if:  · Your pain gets worse.  · Your bloating becomes very bad.  · You have a fever or chills, and your symptoms suddenly get worse.  · You vomit.  · You have bowel movements that are bloody or black.  · You have bleeding from your rectum.  Summary  · Diverticulosis is a condition that develops when small pouches (diverticula) form in the wall of the large intestine (colon).  · You may have a few pouches or many of them.  · This condition is most often diagnosed during an exam for other colon problems.  · Treatment may include increasing the fiber in your diet, taking supplements, or taking medicines.  This information is not intended to replace advice given to you by your health care provider. Make sure you discuss any questions you have with your health care provider.  Document Revised: 07/16/2020 Document Reviewed: 07/16/2020  [email or web address] Patient Education © 2020 [email or web address]    Coronary Artery Disease, Female  Coronary artery disease (CAD) is a condition in which the arteries that lead to the heart (coronary arteries) become narrow or blocked. The narrowing or blockage can lead to decreased blood flow to the heart. Prolonged reduced blood flow can cause a heart attack (myocardial infarction or MI). This condition may also be called coronary heart disease.  Because CAD is the leading cause of death in women, it is important to understand what causes this condition and how it is treated.  What are the causes?  CAD is most often caused by atherosclerosis. This is the buildup of fat and cholesterol (plaque) on the inside of the arteries. Over  time, the plaque may narrow or block the artery, reducing blood flow to the heart. Plaque can also become weak and break off within a coronary artery and cause a sudden blockage. Other less common causes of CAD include:  · A blood clot or a piece of a blood clot or other substance that blocks the flow of blood in a coronary artery (embolism).  · A tearing of the artery (spontaneous coronary artery dissection).  · An enlargement of an artery (aneurysm).  · Inflammation (vasculitis) in the artery wall.  What increases the risk?  The following factors may make you more likely to develop this condition:  · Age. Women over age 55 are at a greater risk of CAD.  · Family history of CAD.  · High blood pressure (hypertension).  · Diabetes.  · High cholesterol levels.  · Tobacco use.  · Lack of exercise.  · Menopause.  ? All postmenopausal women are at greater risk of CAD.  ? Women who have experienced menopause between the ages of 40-45 (early menopause) are at a higher risk of CAD.  ? Women who have experienced menopause before age 40 (premature menopause) are at a very high risk of CAD.  · Excessive alcohol use.  · A diet high in saturated and trans fats, such as fried food and processed meat.  Other possible risk factors include:  · High stress levels.  · Depression.  · Obesity.  · Sleep apnea.  What are the signs or symptoms?  Many people do not have any symptoms during the early stages of CAD. As the condition progresses, symptoms may include:  · Chest pain (angina). The pain can:  ? Feel like crushing or squeezing, or like a tightness, pressure, fullness, or heaviness in the chest.  ? Last more than a few minutes or can stop and recur. The pain tends to get worse with exercise or stress and to fade with rest.  · Pain in the arms, neck, jaw, ear, or back.  · Unexplained heartburn or indigestion.  · Shortness of breath.  · Nausea.  · Sudden cold sweats.  · Sudden light-headedness.  · Fluttering or fast heartbeat  (palpitations).  Many women have chest discomfort and the other symptoms. However, women often have unusual (atypical) symptoms, such as:  · Fatigue.  · Vomiting.  · Unexplained feelings of nervousness or anxiety.  · Unexplained weakness.  · Dizziness or fainting.  How is this diagnosed?  This condition is diagnosed based on:  · Your family and medical history.  · A physical exam.  · Tests, including:  ? A test to check the electrical signals in your heart (electrocardiogram).  ? Exercise stress test. This looks for signs of blockage when the heart is stressed with exercise, such as running on a treadmill.  ? Pharmacologic stress test. This test looks for signs of blockage when the heart is being stressed with a medicine.  ? Blood tests.  ? Coronary angiogram. This is a procedure to look at the coronary arteries to see if there is any blockage. During this test, a dye is injected into your arteries so they appear on an X-ray.  ? Coronary artery CT scan. This CT scan helps detect calcium deposits in your coronary arteries. Calcium deposits are an indicator of CAD.  ? A test that uses sound waves to take a picture of your heart (echocardiogram).  ? Chest X-ray.  How is this treated?  This condition may be treated by:  · Healthy lifestyle changes to reduce risk factors.  · Medicines such as:  ? Antiplatelet medicines and blood-thinning medicines, such as aspirin. These help to prevent blood clots.  ? Nitroglycerin.  ? Blood pressure medicines.  ? Cholesterol-lowering medicine.  · Coronary angioplasty and stenting. During this procedure, a thin, flexible tube is inserted through a blood vessel and into a blocked artery. A balloon or similar device on the end of the tube is inflated to open up the artery. In some cases, a small, mesh tube (stent) is inserted into the artery to keep it open.  · Coronary artery bypass surgery. During this surgery, veins or arteries from other parts of the body are used to create a bypass  around the blockage and allow blood to reach your heart.  Follow these instructions at home:  Medicines  · Take over-the-counter and prescription medicines only as told by your health care provider.  · Do not take the following medicines unless your health care provider approves:  ? NSAIDs, such as ibuprofen, naproxen, or celecoxib.  ? Vitamin supplements that contain vitamin A, vitamin E, or both.  ? Hormone replacement therapy that contains estrogen with or without progestin.  Lifestyle  · Follow an exercise program approved by your health care provider. Aim for 150 minutes of moderate exercise or 75 minutes of vigorous exercise each week.  · Maintain a healthy weight or lose weight as approved by your health care provider.  · Learn to manage stress or try to limit your stress. Ask your health care provider for suggestions if you need help.  · Get screened for depression and seek treatment, if needed.  · Do not use any products that contain nicotine or tobacco, such as cigarettes, e-cigarettes, and chewing tobacco. If you need help quitting, ask your health care provider.  · Do not use illegal drugs.  Eating and drinking    · Follow a heart-healthy diet. A dietitian can help educate you about healthy food options and changes. In general, eat plenty of fruits and vegetables, lean meats, and whole grains.  · Avoid foods high in:  ? Sugar.  ? Salt (sodium).  ? Saturated fats, such as processed or fatty meat.  ? Trans fats, such as fried food.  · Use healthy cooking methods such as roasting, grilling, broiling, baking, poaching, steaming, or stir-frying.  · Do not drink alcohol if:  ? Your health care provider tells you not to drink.  ? You are pregnant, may be pregnant, or are planning to become pregnant.  · If you drink alcohol:  ? Limit how much you have to 0-1 drink a day.  ? Be aware of how much alcohol is in your drink. In the [place] one drink equals one 12 oz bottle of beer (355 mL), one 5 oz glass of wine (148  mL), or one 1½ oz glass of hard liquor (44 mL).  General instructions  · Manage any other health conditions, such as hypertension and diabetes. These conditions affect your heart.  · Your health care provider may ask you to monitor your blood pressure. Ideally, your blood pressure should be below 130/80.  · Keep all follow-up visits as told by your health care provider. This is important.  Get help right away if:  · You have pain in your chest, neck, ear, arm, jaw, stomach, or back that:  ? Lasts more than a few minutes.  ? Is recurring.  ? Is not relieved by taking medicine under your tongue (sublingual nitroglycerin).  · You have profuse sweating without cause.  · You have unexplained:  ? Heartburn or indigestion.  ? Shortness of breath or difficulty breathing.  ? Fluttering or fast heartbeat (palpitations).  ? Nausea or vomiting.  ? Fatigue.  ? Feelings of nervousness or anxiety.  ? Weakness.  ? Diarrhea.  · You have sudden light-headedness or dizziness.  · You faint.  · You feel like hurting yourself or think about taking your own life.  These symptoms may represent a serious problem that is an emergency. Do not wait to see if the symptoms will go away. Get medical help right away. Call your local emergency services (911 in the [place] Do not drive yourself to the hospital.  Summary  · Coronary artery disease (CAD) is a condition in which the arteries that lead to the heart (coronary arteries) become narrow or blocked. The narrowing or blockage can lead to a heart attack.  · Many women have chest discomfort and other common symptoms of CAD. However, women often have unusual (atypical) symptoms, such as fatigue, vomiting, weakness, or dizziness.  · CAD can be treated with lifestyle changes, medicines, surgery, or a combination of these treatments.  This information is not intended to replace advice given to you by your health care provider. Make sure you discuss any questions you have with your health care  provider.  Document Revised: 09/06/2019 Document Reviewed: 08/27/2019  [employer] Patient Education © 2020 [employer]    Living With Depression  Everyone experiences occasional disappointment, sadness, and loss in their lives. When you are feeling down, blue, or sad for at least 2 weeks in a row, it may mean that you have depression. Depression can affect your thoughts and feelings, relationships, daily activities, and physical health. It is caused by changes in the way your brain functions. If you receive a diagnosis of depression, your health care provider will tell you which type of depression you have and what treatment options are available to you.  If you are living with depression, there are ways to help you recover from it and also ways to prevent it from coming back.  How to cope with lifestyle changes  Coping with stress         Stress is your body’s reaction to life changes and events, both good and bad. Stressful situations may include:  · Getting .  · The death of a spouse.  · Losing a job.  · Retiring.  · Having a baby.  Stress can last just a few hours or it can be ongoing. Stress can play a major role in depression, so it is important to learn both how to cope with stress and how to think about it differently.  Talk with your health care provider or a counselor if you would like to learn more about stress reduction. He or she may suggest some stress reduction techniques, such as:  · Music therapy. This can include creating music or listening to music. Choose music that you enjoy and that inspires you.  · Mindfulness-based meditation. This kind of meditation can be done while sitting or walking. It involves being aware of your normal breaths, rather than trying to control your breathing.  · Centering prayer. This is a kind of meditation that involves focusing on a spiritual word or phrase. Choose a word, phrase, or sacred image that is meaningful to you and that brings you peace.  · Deep  breathing. To do this, expand your stomach and inhale slowly through your nose. Hold your breath for 3-5 seconds, then exhale slowly, allowing your stomach muscles to relax.  · Muscle relaxation. This involves intentionally tensing muscles then relaxing them.  Choose a stress reduction technique that fits your lifestyle and personality. Stress reduction techniques take time and practice to develop. Set aside 5-15 minutes a day to do them. Therapists can offer training in these techniques. The training may be covered by some insurance plans. Other things you can do to manage stress include:  · Keeping a stress diary. This can help you learn what triggers your stress and ways to control your response.  · Understanding what your limits are and saying no to requests or events that lead to a schedule that is too full.  · Thinking about how you respond to certain situations. You may not be able to control everything, but you can control how you react.  · Adding humor to your life by watching funny films or TV shows.  · Making time for activities that help you relax and not feeling guilty about spending your time this way.    Medicines  Your health care provider may suggest certain medicines if he or she feels that they will help improve your condition. Avoid using alcohol and other substances that may prevent your medicines from working properly (may interact). It is also important to:  · Talk with your pharmacist or health care provider about all the medicines that you take, their possible side effects, and what medicines are safe to take together.  · Make it your goal to take part in all treatment decisions (shared decision-making). This includes giving input on the side effects of medicines. It is best if shared decision-making with your health care provider is part of your total treatment plan.  If your health care provider prescribes a medicine, you may not notice the full benefits of it for 4-8 weeks. Most people  who are treated for depression need to be on medicine for at least 6-12 months after they feel better. If you are taking medicines as part of your treatment, do not stop taking medicines without first talking to your health care provider. You may need to have the medicine slowly decreased (tapered) over time to decrease the risk of harmful side effects.  Relationships  Your health care provider may suggest family therapy along with individual therapy and drug therapy. While there may not be family problems that are causing you to feel depressed, it is still important to make sure your family learns as much as they can about your mental health. Having your family’s support can help make your treatment successful.  How to recognize changes in your condition  Everyone has a different response to treatment for depression. Recovery from major depression happens when you have not had signs of major depression for two months. This may mean that you will start to:  · Have more interest in doing activities.  · Feel less hopeless than you did 2 months ago.  · Have more energy.  · Overeat less often, or have better or improving appetite.  · Have better concentration.  Your health care provider will work with you to decide the next steps in your recovery. It is also important to recognize when your condition is getting worse. Watch for these signs:  · Having fatigue or low energy.  · Eating too much or too little.  · Sleeping too much or too little.  · Feeling restless, agitated, or hopeless.  · Having trouble concentrating or making decisions.  · Having unexplained physical complaints.  · Feeling irritable, angry, or aggressive.  Get help as soon as you or your family members notice these symptoms coming back.  How to get support and help from others  How to talk with friends and family members about your condition    Talking to friends and family members about your condition can provide you with one way to get support and  guidance. Reach out to trusted friends or family members, explain your symptoms to them, and let them know that you are working with a health care provider to treat your depression.  Financial resources  Not all insurance plans cover mental health care, so it is important to check with your insurance carrier. If paying for co-pays or counseling services is a problem, search for a local or [facility] mental health care center. They may be able to offer public mental health care services at low or no cost when you are not able to see a private health care provider.  If you are taking medicine for depression, you may be able to get the generic form, which may be less expensive. Some makers of prescription medicines also offer help to patients who cannot afford the medicines they need.  Follow these instructions at home:    · Get the right amount and quality of sleep.  · Cut down on using caffeine, tobacco, alcohol, and other potentially harmful substances.  · Try to exercise, such as walking or lifting small weights.  · Take over-the-counter and prescription medicines only as told by your health care provider.  · Eat a healthy diet that includes plenty of vegetables, fruits, whole grains, low-fat dairy products, and lean protein. Do not eat a lot of foods that are high in solid fats, added sugars, or salt.  · Keep all follow-up visits as told by your health care provider. This is important.  Contact a health care provider if:  · You stop taking your antidepressant medicines, and you have any of these symptoms:  ? Nausea.  ? Headache.  ? Feeling lightheaded.  ? Chills and body aches.  ? Not being able to sleep (insomnia).  · You or your friends and family think your depression is getting worse.  Get help right away if:  · You have thoughts of hurting yourself or others.  If you ever feel like you may hurt yourself or others, or have thoughts about taking your own life, get help right away. You can go to your nearest  emergency department or call:  · Your local emergency services (911 in the [place]  · A suicide crisis helpline, such as the National Suicide Prevention Lifeline at 1-557.834.1484. This is open 24-hours a day.  Summary  · If you are living with depression, there are ways to help you recover from it and also ways to prevent it from coming back.  · Work with your health care team to create a management plan that includes counseling, stress management techniques, and healthy lifestyle habits.  This information is not intended to replace advice given to you by your health care provider. Make sure you discuss any questions you have with your health care provider.  Document Revised: 04/10/2020 Document Reviewed: 11/20/2017  [employer] Patient Education © 2020 [employer]    Managing Anxiety, Adult  After being diagnosed with an anxiety disorder, you may be relieved to know why you have felt or behaved a certain way. You may also feel overwhelmed about the treatment ahead and what it will mean for your life. With care and support, you can manage this condition and recover from it.  How to manage lifestyle changes  Managing stress and anxiety    Stress is your body's reaction to life changes and events, both good and bad. Most stress will last just a few hours, but stress can be ongoing and can lead to more than just stress. Although stress can play a major role in anxiety, it is not the same as anxiety. Stress is usually caused by something external, such as a deadline, test, or competition. Stress normally passes after the triggering event has ended.   Anxiety is caused by something internal, such as imagining a terrible outcome or worrying that something will go wrong that will devastate you. Anxiety often does not go away even after the triggering event is over, and it can become long-term (chronic) worry. It is important to understand the differences between stress and anxiety and to manage your stress effectively so  that it does not lead to an anxious response.  Talk with your health care provider or a counselor to learn more about reducing anxiety and stress. He or she may suggest tension reduction techniques, such as:  · Music therapy. This can include creating or listening to music that you enjoy and that inspires you.  · Mindfulness-based meditation. This involves being aware of your normal breaths while not trying to control your breathing. It can be done while sitting or walking.  · Centering prayer. This involves focusing on a word, phrase, or sacred image that means something to you and brings you peace.  · Deep breathing. To do this, expand your stomach and inhale slowly through your nose. Hold your breath for 3-5 seconds. Then exhale slowly, letting your stomach muscles relax.  · Self-talk. This involves identifying thought patterns that lead to anxiety reactions and changing those patterns.  · Muscle relaxation. This involves tensing muscles and then relaxing them.  Choose a tension reduction technique that suits your lifestyle and personality. These techniques take time and practice. Set aside 5-15 minutes a day to do them. Therapists can offer counseling and training in these techniques. The training to help with anxiety may be covered by some insurance plans. Other things you can do to manage stress and anxiety include:  · Keeping a stress/anxiety diary. This can help you learn what triggers your reaction and then learn ways to manage your response.  · Thinking about how you react to certain situations. You may not be able to control everything, but you can control your response.  · Making time for activities that help you relax and not feeling guilty about spending your time in this way.  · Visual imagery and yoga can help you stay calm and relax.    Medicines  Medicines can help ease symptoms. Medicines for anxiety include:  · Anti-anxiety drugs.  · Antidepressants.  Medicines are often used as a primary  treatment for anxiety disorder. Medicines will be prescribed by a health care provider. When used together, medicines, psychotherapy, and tension reduction techniques may be the most effective treatment.  Relationships  Relationships can play a big part in helping you recover. Try to spend more time connecting with trusted friends and family members. Consider going to couples counseling, taking family education classes, or going to family therapy. Therapy can help you and others better understand your condition.  How to recognize changes in your anxiety  Everyone responds differently to treatment for anxiety. Recovery from anxiety happens when symptoms decrease and stop interfering with your daily activities at home or work. This may mean that you will start to:  · Have better concentration and focus. Worry will interfere less in your daily thinking.  · Sleep better.  · Be less irritable.  · Have more energy.  · Have improved memory.  It is important to recognize when your condition is getting worse. Contact your health care provider if your symptoms interfere with home or work and you feel like your condition is not improving.  Follow these instructions at home:  Activity  · Exercise. Most adults should do the following:  ? Exercise for at least 150 minutes each week. The exercise should increase your heart rate and make you sweat (moderate-intensity exercise).  ? Strengthening exercises at least twice a week.  · Get the right amount and quality of sleep. Most adults need 7-9 hours of sleep each night.  Lifestyle    · Eat a healthy diet that includes plenty of vegetables, fruits, whole grains, low-fat dairy products, and lean protein. Do not eat a lot of foods that are high in solid fats, added sugars, or salt.  · Make choices that simplify your life.  · Do not use any products that contain nicotine or tobacco, such as cigarettes, e-cigarettes, and chewing tobacco. If you need help quitting, ask your health care  provider.  · Avoid caffeine, alcohol, and certain over-the-counter cold medicines. These may make you feel worse. Ask your pharmacist which medicines to avoid.  General instructions  · Take over-the-counter and prescription medicines only as told by your health care provider.  · Keep all follow-up visits as told by your health care provider. This is important.  Where to find support  You can get help and support from these sources:  · Self-help groups.  · Online and community organizations.  · A trusted spiritual leader.  · Couples counseling.  · Family education classes.  · Family therapy.  Where to find more information  You may find that joining a support group helps you deal with your anxiety. The following sources can help you locate counselors or support groups near you:  · Mental Health [place]: www.mentalhealthamerica.net  · Anxiety and Depression Association of [place] (ADAA): www.adaa.org  · National [place] on Mental Illness (NEHEMIAS): www.nehemias.org  Contact a health care provider if you:  · Have a hard time staying focused or finishing daily tasks.  · Spend many hours a day feeling worried about everyday life.  · Become exhausted by worry.  · Start to have headaches, feel tense, or have nausea.  · Urinate more than normal.  · Have diarrhea.  Get help right away if you have:  · A racing heart and shortness of breath.  · Thoughts of hurting yourself or others.  If you ever feel like you may hurt yourself or others, or have thoughts about taking your own life, get help right away. You can go to your nearest emergency department or call:  · Your local emergency services (911 in the [place]  · A suicide crisis helpline, such as the National Suicide Prevention Lifeline at 1-242.179.8461. This is open 24 hours a day.  Summary  · Taking steps to learn and use tension reduction techniques can help calm you and help prevent triggering an anxiety reaction.  · When used together, medicines, psychotherapy, and tension  reduction techniques may be the most effective treatment.  · Family, friends, and partners can play a big part in helping you recover from an anxiety disorder.  This information is not intended to replace advice given to you by your health care provider. Make sure you discuss any questions you have with your health care provider.  Document Revised: 05/19/2020 Document Reviewed: 05/19/2020  [employer] Patient Education © 2020 [employer]    Managing Obsessive-Compulsive Disorder  If you have been diagnosed with obsessive-compulsive disorder (OCD), you may be relieved that you now know why you have felt or behaved a certain way. You may also feel overwhelmed about the treatment ahead, how to get the support you need, and how to deal with the condition day-to-day. With treatment and support, you can manage your OCD.  How to manage lifestyle changes  Managing stress  Stress is your body's reaction to life changes and events, both good and bad. Stress can play a major role in OCD, so it is important to learn how to manage stress. Some techniques to manage stress include:  · Meditation, muscle relaxation, and breathing exercises.  · Exercise. Even a short daily walk can help to lower stress levels.  · Getting enough good-quality sleep.  · Spending time on hobbies that you enjoy.  · Accepting and letting go of things that you cannot change.  To help you manage stress associated with OCD, your health care provider may recommend exposure and response prevention therapy. In this therapy, you will be exposed to the distressing situation that triggers your compulsion and be prevented from responding to it. With repetition of this process over time, you will no longer feel the distress or need to perform the compulsion.  Medicines  Your health care provider may suggest certain medicines for depression (antidepressants) if he or she feels that they will help to improve your condition. Avoid using alcohol and other substances that  may prevent your medicines from working properly. It is also important to:  · Talk with your pharmacist or health care provider about all medicines that you take, their possible side effects, and which medicines are safe to take together.  · Make it your goal to take part in all treatment decisions (shared decision-making). Ask about possible side effects of medicines that your health care provider recommends, and tell him or her how you feel about having those side effects. It is best if shared decision-making with your health care provider is part of your total treatment plan.  If you are taking medicines as part of your treatment, do not stop taking them before you ask your health care provider if it is safe to stop. You may need to have the medicine slowly decreased (tapered) over time to lower the risk of harmful side effects.  Relationships  Consider giving education materials to friends and family. Your family and friends may need to learn about your OCD in order for them to understand you and support you as you manage your condition. Family therapy may also help to lower stress and relieve tension.  How to recognize changes in your condition  Some signs that your condition may be getting worse include:  · Being anxious about germs or dirt.  · Having harmful thoughts about yourself or others.  · Making sure that household objects are alike or perfectly organized in a specific way.  · Having great difficulty making decisions, or second-guessing yourself after making a decision.  · Constant cleaning and handwashing.  · Repeating behavior such as repeatedly checking to see if a door is locked or the oven is off.  · Counting nonstop or uncontrollably.  Follow these instructions at home:    Medicines  · Take over-the-counter and prescription medicines only as told by your health care provider.  · Check with your health care provider before starting any new prescription or over-the-counter medicines.  General  instructions  · Ask for support from trusted family members or friends to make sure you stay on track with your treatment.  · Keep a journal to write down your daily moods, medicines, sleep habits, and life events. Doing this may help you have more success with your treatment.  · Maintain a healthy lifestyle. Eat a healthy diet, exercise regularly, get plenty of sleep, and take time to relax.  · Keep all follow-up visits as told by your health care provider and therapist. This is important.  Where to find support  Talking to others  It may be difficult to tell loved ones about your condition, but they can be a good support system for you. You can work with your therapist to decide whom to tell and when to tell them. Here are some tips for starting the conversation:  · Start by sharing your experience with OCD. It is up to you how much detail you want to provide.  · Let your loved ones know that you are seeking treatment.  · Do not expect loved ones to understand your condition right away.  Finances  Not all insurance plans cover mental health care, so it is important to check with your insurance carrier. If paying for co-pays or counseling services is a problem, search for a [facility] or [facility] mental health care center. Public mental health care services may be offered there at a low cost or no cost when you are not able to see a private health care provider.  If you are taking medicine for depression, you may be able to get the generic form, which may be less expensive than brand-name medicine. Some makers of prescription medicines also offer help to patients who cannot afford the medicines they need.  Questions to ask your health care provider  · If you are taking medicines:  ? How long do I need to take medicine?  ? Are there any long-term side effects of my medicine?  ? Are there any alternatives to taking medicine?  · How would I benefit from therapy?  · How often should I follow up with a health care  provider?  Where to find more information  · International OCD Foundation: www.iocdf.org  · National [place] on Mental Illness (NEHEMIAS): www.nehemias.org  Contact a health care provider if:  · Your symptoms get worse or they do not get better with treatment.  · You develop new symptoms.  Get help right away if:  · You have severe side effects after taking your medicine.  · You have thoughts about hurting yourself or others.  If you ever feel like you may hurt yourself or others, or have thoughts about taking your own life, get help right away. You can go to your nearest emergency department or call:  · Your local emergency services (911 in the [place]  · A suicide crisis helpline, such as the National Suicide Prevention Lifeline at 1-800.733.7402. This is open 24 hours a day.  Summary  · Stress can play a major role in obsessive-compulsive disorder (OCD). Learning ways to manage stress may help your treatment work better for you.  · If you are taking medicines as part of your treatment, do not stop taking medicines before you ask your health care provider if it is safe to stop.  · When talking with family members and friends about your OCD, decide how much detail you want to give them and be patient as they work to understand your condition.  · Keep all follow-up visits as told by your health care provider and therapist. This is important.  This information is not intended to replace advice given to you by your health care provider. Make sure you discuss any questions you have with your health care provider.  Document Revised: 04/10/2020 Document Reviewed: 04/19/2018  [employer] Patient Education © 2020 [employer]    Polycystic Ovarian Syndrome    Polycystic ovarian syndrome (PCOS) is a common hormonal disorder among women of reproductive age. In most women with PCOS, many small fluid-filled sacs (cysts) grow on the ovaries, and the cysts are not part of a normal menstrual cycle. PCOS can cause problems with your  menstrual periods and make it difficult to get pregnant. It can also cause an increased risk of miscarriage with pregnancy. If it is not treated, PCOS can lead to serious health problems, such as diabetes and heart disease.  What are the causes?  The cause of PCOS is not known, but it may be the result of a combination of certain factors, such as:  · Irregular menstrual cycle.  · High levels of certain hormones (androgens).  · Problems with the hormone that helps to control blood sugar (insulin resistance).  · Certain genes.  What increases the risk?  This condition is more likely to develop in women who have a family history of PCOS.  What are the signs or symptoms?  Symptoms of PCOS may include:  · Multiple ovarian cysts.  · Infrequent periods or no periods.  · Periods that are too frequent or too heavy.  · Unpredictable periods.  · Inability to get pregnant (infertility) because of not ovulating.  · Increased growth of hair on the face, chest, stomach, back, thumbs, thighs, or toes.  · Acne or oily skin. Acne may develop during adulthood, and it may not respond to treatment.  · Pelvic pain.  · Weight gain or obesity.  · Patches of thickened and dark brown or black skin on the neck, arms, breasts, or thighs (acanthosis nigricans).  · Excess hair growth on the face, chest, abdomen, or upper thighs (hirsutism).  How is this diagnosed?  This condition is diagnosed based on:  · Your medical history.  · A physical exam, including a pelvic exam. Your health care provider may look for areas of increased hair growth on your skin.  · Tests, such as:  ? Ultrasound. This may be used to examine the ovaries and the lining of the uterus (endometrium) for cysts.  ? Blood tests. These may be used to check levels of sugar (glucose), male hormone (testosterone), and female hormones (estrogen and progesterone) in your blood.  How is this treated?  There is no cure for PCOS, but treatment can help to manage symptoms and prevent more  health problems from developing. Treatment varies depending on:  · Your symptoms.  · Whether you want to have a baby or whether you need birth control (contraception).  Treatment may include nutrition and lifestyle changes along with:  · Progesterone hormone to start a menstrual period.  · Birth control pills to help you have regular menstrual periods.  · Medicines to make you ovulate, if you want to get pregnant.  · Medicine to reduce excessive hair growth.  · Surgery, in severe cases. This may involve making small holes in one or both of your ovaries. This decreases the amount of testosterone that your body produces.  Follow these instructions at home:  · Take over-the-counter and prescription medicines only as told by your health care provider.  · Follow a healthy meal plan. This can help you reduce the effects of PCOS.  ? Eat a healthy diet that includes lean proteins, complex carbohydrates, fresh fruits and vegetables, low-fat dairy products, and healthy fats. Make sure to eat enough fiber.  · If you are overweight, lose weight as told by your health care provider.  ? Losing 10% of your body weight may improve symptoms.  ? Your health care provider can determine how much weight loss is best for you and can help you lose weight safely.  · Keep all follow-up visits as told by your health care provider. This is important.  Contact a health care provider if:  · Your symptoms do not get better with medicine.  · You develop new symptoms.  This information is not intended to replace advice given to you by your health care provider. Make sure you discuss any questions you have with your health care provider.  Document Revised: 11/30/2018 Document Reviewed: 06/04/2017  [employer] Patient Education © 2020 [employer]

## 2021-02-06 NOTE — PROGRESS NOTES
Follow Up Office Note     Patient Name: Jazmín Garcia  : 1970   MRN: [id number]     Chief Complaint:    Chief Complaint   Patient presents with   • Establish Care       History of Present Illness: Jazmín Garcia is a 50 y.o. female who presents today to establish care with a new PCP and for annual physical exam. She has relocated from [place] to [place] recently. Patient reports a history of multiple chronic conditions including CAD, HTN, hypothyroidism, PCOS, ARIADNE and history of DVT for which she is currently taking Eliquis. She is requesting specialty referrals as well as PA on her Eliquis and NTG. Patient also has a history of OCD, depression and anxiety for which she has been going to behavioral health counseling in [place]. She is requesting a referral to behavioral health services here in [place]. Patient reports that she was recently hospitalized at [facility] 20 for Covid-19 pneumonia. She has some records with her today.      PHQ-2/PHQ-9 Depression Screening 2021   Little interest or pleasure in doing things 1   Feeling down, depressed, or hopeless 1   Trouble falling or staying asleep, or sleeping too much 2   Feeling tired or having little energy 1   Poor appetite or overeating 1   Feeling bad about yourself - or that you are a failure or have let yourself or your family down 0   Trouble concentrating on things, such as reading the newspaper or watching television 1   Moving or speaking so slowly that other people could have noticed. Or the opposite - being so fidgety or restless that you have been moving around a lot more than usual 1   Thoughts that you would be better off dead, or of hurting yourself in some way 1   Total Score 9   If you checked off any problems, how difficult have these problems made it for you to do your work, take care of things at home, or get along with other people? Not difficult at all     ANIBAL-7  Feeling nervous, anxious or on edge: Several  days  Not being able to stop or control worrying: Several days  Worrying too much about different things: Several days  Trouble Relaxing: Not at all  Being so restless that it is hard to sit still: Not at all  Feeling afraid as if something awful might happen: Not at all  Becoming easily annoyed or irritable: Not at all  ANIBAL 7 Total Score: 3  If you checked any problems, how difficult have these problems made it for you to do your work, take care of things at home, or get along with other people: Not difficult at all      Subjective      Review of Systems:   Review of Systems   Constitutional: Negative.    HENT: Negative.    Respiratory: Negative.    Cardiovascular: Negative.    Gastrointestinal: Negative.         History of diverticulitis   Genitourinary: Negative.    Musculoskeletal: Negative.    Skin: Negative.    Neurological: Negative.    Psychiatric/Behavioral: Negative for dysphoric mood and suicidal ideas. The patient is not nervous/anxious.         Past Medical History:   Past Medical History:   Diagnosis Date   • Anxiety    • Cardiovascular disease    • Depression    • Diverticulitis    • DVT (deep venous thrombosis) (CMS/formerly [facility])    • Hashimoto's disease    • Heart attack (CMS/HCC)     x5   • Hypertension    • Hypothyroidism    • Lyme disease    • OCD (obsessive compulsive disorder)    • Sleep apnea      Medications:     Current Outpatient Medications:   •  atorvastatin (LIPITOR) 40 MG tablet, Take 40 mg by mouth Daily., Disp: , Rfl:   •  Eliquis 5 MG tablet tablet, , Disp: , Rfl:   •  ibuprofen (ADVIL,MOTRIN) 800 MG tablet, Take 800 mg by mouth Every 6 (Six) Hours As Needed for Mild Pain ., Disp: , Rfl:   •  levothyroxine (SYNTHROID, LEVOTHROID) 200 MCG tablet, Take 200 mcg by mouth Daily., Disp: , Rfl:   •  metFORMIN (GLUCOPHAGE) 500 MG tablet, Take 500 mg by mouth Daily With Breakfast., Disp: , Rfl:   •  NIFEDIPINE ER PO, Take 60 mg by mouth Daily., Disp: , Rfl:   •  nitroglycerin (NITROLINGUAL) 0.4  "MG/SPRAY spray, Place 1 spray under the tongue Every 5 (Five) Minutes As Needed for Chest Pain., Disp: , Rfl:   •  valsartan-hydrochlorothiazide (DIOVAN-HCT) 80-12.5 MG per tablet, Take 1 tablet by mouth Daily., Disp: , Rfl:     Allergies:   Allergies   Allergen Reactions   • Adhesive Tape Other (See Comments)     blisters   • Darvon [Propoxyphene] GI Intolerance   • Lisinopril Nausea Only         Objective     Physical Exam:  Vital Signs:   Vitals:    02/06/21 1113   BP: 148/88   BP Location: Left arm   Patient Position: Sitting   Cuff Size: Adult   Pulse: 83   Resp: 22   Temp: 98.2 °F (36.8 °C)   SpO2: 99%   Weight: (!) 143 kg (316 lb)   Height: 167.6 cm (66\")   PainSc:   4     Body mass index is 51 kg/m².     Physical Exam  Vitals signs and nursing note reviewed. Exam conducted with a chaperone present.   Constitutional:       General: She is not in acute distress.     Appearance: Normal appearance. She is well-developed and well-groomed. She is morbidly obese. She is not ill-appearing, toxic-appearing or diaphoretic.   HENT:      Head: Normocephalic and atraumatic.      Right Ear: Tympanic membrane, ear canal and external ear normal.      Left Ear: Tympanic membrane, ear canal and external ear normal.      Nose: Nose normal.      Mouth/Throat:      Lips: Pink.      Mouth: Mucous membranes are moist.      Pharynx: Oropharynx is clear. Uvula midline. No posterior oropharyngeal erythema.   Neck:      Musculoskeletal: Normal range of motion and neck supple.      Thyroid: No thyroid mass, thyromegaly or thyroid tenderness.   Cardiovascular:      Rate and Rhythm: Normal rate and regular rhythm.      Pulses: Normal pulses.      Heart sounds: Normal heart sounds, S1 normal and S2 normal. No murmur.   Pulmonary:      Effort: Pulmonary effort is normal. No respiratory distress.      Breath sounds: Normal breath sounds.   Abdominal:      General: Bowel sounds are normal. There is no distension.      Palpations: Abdomen is " soft.      Tenderness: There is no abdominal tenderness.   Musculoskeletal: Normal range of motion.      Right lower leg: No edema.      Left lower leg: No edema.   Lymphadenopathy:      Cervical: No cervical adenopathy.   Skin:     General: Skin is warm and dry.      Findings: No rash.   Neurological:      General: No focal deficit present.      Mental Status: She is alert and oriented to person, place, and time.   Psychiatric:         Attention and Perception: Attention and perception normal.         Mood and Affect: Mood and affect normal.         Speech: Speech normal.         Behavior: Behavior normal. Behavior is cooperative.         Thought Content: Thought content normal.         Cognition and Memory: Cognition and memory normal.         Judgment: Judgment normal.         Assessment / Plan      Assessment/Plan:   Diagnoses and all orders for this visit:    1. Encounter for medical examination to establish care (Primary)  -     Comprehensive Metabolic Panel  -     TSH  -     CBC Auto Differential  -     POC Urinalysis Dipstick, Automated  -     Lipid Panel    2. Annual physical exam  -     Comprehensive Metabolic Panel  -     TSH  -     CBC Auto Differential  -     POC Urinalysis Dipstick, Automated  -     Lipid Panel  -     Hemoglobin A1c  -     Ambulatory Referral to Gynecology    3. Morbidly obese (CMS/HCC)  -     Lipid Panel    4. Need for hepatitis C screening test  -     Hepatitis C Antibody    5. Essential hypertension  -     Comprehensive Metabolic Panel  -     TSH  -     CBC Auto Differential  -     POC Urinalysis Dipstick, Automated  -     Lipid Panel    6. Cardiovascular disease  -     Comprehensive Metabolic Panel  -     TSH  -     Lipid Panel  -     Ambulatory Referral to Cardiology    7. Hypothyroidism due to Hashimoto's thyroiditis  -     TSH  -     Ambulatory Referral to Endocrinology    8. PCOS (polycystic ovarian syndrome)  -     Hemoglobin A1c    9. Diverticulosis  -     Ambulatory Referral  to Gastroenterology    10. Anxiety  -     Ambulatory Referral to Behavioral Health    11. Depression, unspecified depression type  -     Ambulatory Referral to Behavioral Health    12. Obsessive-compulsive disorder, unspecified type  -     Ambulatory Referral to Behavioral Health    13. Encounter for screening mammogram for malignant neoplasm of breast  -     Mammo Screening Digital Tomosynthesis Bilateral With CAD; Future         Discussed the nature of the medical condition(s) risks, complications, implications, management, safe and proper use of medications. Encouraged medication compliance, and keeping scheduled follow up appointments with me and any other providers.      The patient is here for a health maintenance visit.  Currently, the patient consumes a regular diet and has no routine exercise regimen. Screening lab work is ordered.  Immunizations are declined today and vaccine education is provided.  Advice and education is given regarding nutrition, aerobic exercise, routine dental evaluations, routine eye exams, reproductive health, cardiovascular risk reduction, sunscreen use, self skin examination (annual dermatology evaluations) and seat belt use (general overall safety).  Further recommendations after lab evaluation.  Annual wellness evaluations recommended.      BONITA Castellano  [facility] [place]       Please note that portions of this note may have been completed with a voice recognition program. Efforts were made to edit the dictations, but occasionally words are mistranscribed.

## 2021-02-07 LAB
ALBUMIN SERPL-MCNC: 4.2 G/DL (ref 3.5–5.2)
ALBUMIN/GLOB SERPL: 1.5 G/DL
ALP SERPL-CCNC: 89 U/L (ref 39–117)
ALT SERPL W P-5'-P-CCNC: 27 U/L (ref 1–33)
ANION GAP SERPL CALCULATED.3IONS-SCNC: 12.5 MMOL/L (ref 5–15)
AST SERPL-CCNC: 23 U/L (ref 1–32)
BASOPHILS # BLD AUTO: 0.07 10*3/MM3 (ref 0–0.2)
BASOPHILS NFR BLD AUTO: 0.8 % (ref 0–1.5)
BILIRUB SERPL-MCNC: 0.3 MG/DL (ref 0–1.2)
BUN SERPL-MCNC: 12 MG/DL (ref 6–20)
BUN/CREAT SERPL: 11.7 (ref 7–25)
CALCIUM SPEC-SCNC: 10 MG/DL (ref 8.6–10.5)
CHLORIDE SERPL-SCNC: 104 MMOL/L (ref 98–107)
CHOLEST SERPL-MCNC: 119 MG/DL (ref 0–200)
CO2 SERPL-SCNC: 24.5 MMOL/L (ref 22–29)
CREAT SERPL-MCNC: 1.03 MG/DL (ref 0.57–1)
DEPRECATED RDW RBC AUTO: 47.7 FL (ref 37–54)
EOSINOPHIL # BLD AUTO: 0.12 10*3/MM3 (ref 0–0.4)
EOSINOPHIL NFR BLD AUTO: 1.5 % (ref 0.3–6.2)
ERYTHROCYTE [DISTWIDTH] IN BLOOD BY AUTOMATED COUNT: 15.1 % (ref 12.3–15.4)
GFR SERPL CREATININE-BSD FRML MDRD: 57 ML/MIN/1.73
GLOBULIN UR ELPH-MCNC: 2.8 GM/DL
GLUCOSE SERPL-MCNC: 85 MG/DL (ref 65–99)
HBA1C MFR BLD: 6.31 % (ref 4.8–5.6)
HCT VFR BLD AUTO: 40 % (ref 34–46.6)
HCV AB SER DONR QL: NORMAL
HDLC SERPL-MCNC: 34 MG/DL (ref 40–60)
HGB BLD-MCNC: 13.1 G/DL (ref 12–15.9)
IMM GRANULOCYTES # BLD AUTO: 0.07 10*3/MM3 (ref 0–0.05)
IMM GRANULOCYTES NFR BLD AUTO: 0.8 % (ref 0–0.5)
LDLC SERPL CALC-MCNC: 67 MG/DL (ref 0–100)
LDLC/HDLC SERPL: 1.94 {RATIO}
LYMPHOCYTES # BLD AUTO: 3.05 10*3/MM3 (ref 0.7–3.1)
LYMPHOCYTES NFR BLD AUTO: 36.9 % (ref 19.6–45.3)
MCH RBC QN AUTO: 28.4 PG (ref 26.6–33)
MCHC RBC AUTO-ENTMCNC: 32.8 G/DL (ref 31.5–35.7)
MCV RBC AUTO: 86.6 FL (ref 79–97)
MONOCYTES # BLD AUTO: 0.32 10*3/MM3 (ref 0.1–0.9)
MONOCYTES NFR BLD AUTO: 3.9 % (ref 5–12)
NEUTROPHILS NFR BLD AUTO: 4.64 10*3/MM3 (ref 1.7–7)
NEUTROPHILS NFR BLD AUTO: 56.1 % (ref 42.7–76)
NRBC BLD AUTO-RTO: 0 /100 WBC (ref 0–0.2)
PLATELET # BLD AUTO: 263 10*3/MM3 (ref 140–450)
PMV BLD AUTO: 10.9 FL (ref 6–12)
POTASSIUM SERPL-SCNC: 4 MMOL/L (ref 3.5–5.2)
PROT SERPL-MCNC: 7 G/DL (ref 6–8.5)
RBC # BLD AUTO: 4.62 10*6/MM3 (ref 3.77–5.28)
SODIUM SERPL-SCNC: 141 MMOL/L (ref 136–145)
TRIGL SERPL-MCNC: 96 MG/DL (ref 0–150)
TSH SERPL DL<=0.05 MIU/L-ACNC: 26.7 UIU/ML (ref 0.27–4.2)
VLDLC SERPL-MCNC: 18 MG/DL (ref 5–40)
WBC # BLD AUTO: 8.27 10*3/MM3 (ref 3.4–10.8)

## 2021-02-07 RX ORDER — LEVOTHYROXINE SODIUM 0.2 MG/1
200 TABLET ORAL DAILY
COMMUNITY
End: 2021-02-09 | Stop reason: SDUPTHER

## 2021-02-07 RX ORDER — NITROGLYCERIN 400 UG/1
1 SPRAY ORAL
COMMUNITY

## 2021-02-09 ENCOUNTER — PRIOR AUTHORIZATION (OUTPATIENT)
Dept: FAMILY MEDICINE CLINIC | Facility: CLINIC | Age: 51
End: 2021-02-09

## 2021-02-09 DIAGNOSIS — E28.2 PCOS (POLYCYSTIC OVARIAN SYNDROME): ICD-10-CM

## 2021-02-09 DIAGNOSIS — I10 ESSENTIAL HYPERTENSION: ICD-10-CM

## 2021-02-09 DIAGNOSIS — E06.3 HYPOTHYROIDISM DUE TO HASHIMOTO'S THYROIDITIS: ICD-10-CM

## 2021-02-09 DIAGNOSIS — E03.8 HYPOTHYROIDISM DUE TO HASHIMOTO'S THYROIDITIS: ICD-10-CM

## 2021-02-09 DIAGNOSIS — I25.10 CARDIOVASCULAR DISEASE: Primary | ICD-10-CM

## 2021-02-09 RX ORDER — ATORVASTATIN CALCIUM 40 MG/1
40 TABLET, FILM COATED ORAL NIGHTLY
Qty: 30 TABLET | Refills: 5 | Status: SHIPPED | OUTPATIENT
Start: 2021-02-09 | End: 2021-10-13

## 2021-02-09 RX ORDER — LEVOTHYROXINE SODIUM 0.2 MG/1
200 TABLET ORAL DAILY
Qty: 30 TABLET | Refills: 5 | Status: SHIPPED | OUTPATIENT
Start: 2021-02-09 | End: 2021-10-01 | Stop reason: SDUPTHER

## 2021-02-09 RX ORDER — NIFEDIPINE 60 MG/1
60 TABLET, FILM COATED, EXTENDED RELEASE ORAL DAILY
Qty: 30 TABLET | Refills: 5 | Status: SHIPPED | OUTPATIENT
Start: 2021-02-09 | End: 2021-10-13

## 2021-02-09 RX ORDER — VALSARTAN AND HYDROCHLOROTHIAZIDE 80; 12.5 MG/1; MG/1
1 TABLET, FILM COATED ORAL DAILY
Qty: 30 TABLET | Refills: 5 | Status: SHIPPED | OUTPATIENT
Start: 2021-02-09 | End: 2021-10-13

## 2021-02-10 DIAGNOSIS — E06.3 HYPOTHYROIDISM DUE TO HASHIMOTO'S THYROIDITIS: Primary | ICD-10-CM

## 2021-02-10 DIAGNOSIS — E03.8 HYPOTHYROIDISM DUE TO HASHIMOTO'S THYROIDITIS: Primary | ICD-10-CM

## 2021-02-10 RX ORDER — LEVOTHYROXINE SODIUM 0.03 MG/1
25 TABLET ORAL DAILY
Qty: 30 TABLET | Refills: 3 | Status: SHIPPED | OUTPATIENT
Start: 2021-02-10 | End: 2021-08-16

## 2021-02-12 ENCOUNTER — TELEPHONE (OUTPATIENT)
Dept: FAMILY MEDICINE CLINIC | Facility: CLINIC | Age: 51
End: 2021-02-12

## 2021-02-12 NOTE — TELEPHONE ENCOUNTER
----- Message from BONITA Castellano sent at 2/10/2021  7:58 AM EST -----  Please call patient and inform her that I am increasing her dose of levothyroxine. I want her to take one 25 mcg tablet in addition to the 200 mcg tablet she is now taking for a total of 225 mcg/day. She should take them together in the morning 30 minutes before eating. We will need to recheck her TSH in 6 weeks.

## 2021-02-18 DIAGNOSIS — Z86.718 HISTORY OF DVT OF LOWER EXTREMITY: Primary | ICD-10-CM

## 2021-02-18 RX ORDER — APIXABAN 5 MG/1
TABLET, FILM COATED ORAL
Qty: 60 TABLET | Status: CANCELLED | OUTPATIENT
Start: 2021-02-18

## 2021-02-18 RX ORDER — APIXABAN 5 MG/1
5 TABLET, FILM COATED ORAL EVERY 12 HOURS SCHEDULED
Qty: 60 TABLET | Refills: 5 | Status: SHIPPED | OUTPATIENT
Start: 2021-02-18 | End: 2021-04-19 | Stop reason: SDUPTHER

## 2021-02-18 NOTE — TELEPHONE ENCOUNTER
Caller: Jazmín Garcia    Relationship: Self    Best call back number: [phone]    Medication needed:   Requested Prescriptions     Pending Prescriptions Disp Refills   • Eliquis 5 MG tablet tablet 60 tablet        When do you need the refill by: 02-21-21    What details did the patient provide when requesting the medication: PATIENT STATED THAT SHE NEEDED A PA FOR THIS MEDICATION AND WOULD LIKE A REFILL AS WELL    Does the patient have less than a 3 day supply:  [x] Yes  [] No    What is the patient's preferred pharmacy: [facility] [address] [facility] [phone] [facility] [phone] FX

## 2021-02-19 ENCOUNTER — TELEPHONE (OUTPATIENT)
Dept: FAMILY MEDICINE CLINIC | Facility: CLINIC | Age: 51
End: 2021-02-19

## 2021-02-22 ENCOUNTER — TELEPHONE (OUTPATIENT)
Dept: FAMILY MEDICINE CLINIC | Facility: CLINIC | Age: 51
End: 2021-02-22

## 2021-02-22 NOTE — TELEPHONE ENCOUNTER
----- Message from Dana Jett sent at 2/22/2021 11:32 AM EST -----  Regarding: PLEASE CALL  Contact: [phone]  PT STATES THAT SHE WOULD LIKE TO SPEAK WITH A MANAGER REGARDING MISCOMMUNICATIONS THAT SHE IS HAVING WITH [facility].

## 2021-02-22 NOTE — TELEPHONE ENCOUNTER
Patient advised looked to be a glitch in the system. Mychart generated a denial but we actually sent in the rx. Advised there should not be an issue with getting her medication but of there is to please call us back so we can get her taken care of.

## 2021-04-19 ENCOUNTER — TELEMEDICINE (OUTPATIENT)
Dept: FAMILY MEDICINE CLINIC | Facility: CLINIC | Age: 51
End: 2021-04-19

## 2021-04-19 ENCOUNTER — E-VISIT (OUTPATIENT)
Dept: FAMILY MEDICINE CLINIC | Facility: TELEHEALTH | Age: 51
End: 2021-04-19

## 2021-04-19 DIAGNOSIS — K52.9 GASTROENTERITIS: Primary | ICD-10-CM

## 2021-04-19 DIAGNOSIS — K52.9 ACUTE GASTROENTERITIS: Primary | ICD-10-CM

## 2021-04-19 DIAGNOSIS — Z86.718 HISTORY OF DVT OF LOWER EXTREMITY: ICD-10-CM

## 2021-04-19 PROCEDURE — 99213 OFFICE O/P EST LOW 20 MIN: CPT | Performed by: FAMILY MEDICINE

## 2021-04-19 PROCEDURE — 99422 OL DIG E/M SVC 11-20 MIN: CPT | Performed by: NURSE PRACTITIONER

## 2021-04-19 RX ORDER — ONDANSETRON 4 MG/1
4 TABLET, ORALLY DISINTEGRATING ORAL EVERY 8 HOURS PRN
Qty: 10 TABLET | Refills: 0 | Status: SHIPPED | OUTPATIENT
Start: 2021-04-19 | End: 2021-04-19 | Stop reason: CLARIF

## 2021-04-19 RX ORDER — APIXABAN 5 MG/1
5 TABLET, FILM COATED ORAL EVERY 12 HOURS SCHEDULED
Qty: 60 TABLET | Refills: 5 | Status: SHIPPED | OUTPATIENT
Start: 2021-04-19 | End: 2022-03-18

## 2021-04-19 RX ORDER — PROMETHAZINE HYDROCHLORIDE 25 MG/1
25 TABLET ORAL EVERY 6 HOURS PRN
Qty: 30 TABLET | Refills: 0 | Status: SHIPPED | OUTPATIENT
Start: 2021-04-19 | End: 2022-03-02

## 2021-04-19 RX ORDER — ONDANSETRON 4 MG/1
4 TABLET, FILM COATED ORAL EVERY 8 HOURS PRN
Qty: 10 TABLET | Refills: 0 | Status: SHIPPED | OUTPATIENT
Start: 2021-04-19 | End: 2022-03-02

## 2021-04-19 NOTE — PROGRESS NOTES
Jazmín Hicks is a 50 y.o. female who presents today for Vomiting    You have chosen to receive care through a telemedicine visit. Do you consent to use a telemedicine visit for your medical care today? Yes.    Vomiting   This is a new (Since saturday patient has had vomitting and diarrhea. ) problem. The current episode started in the past 7 days. The problem occurs 5 to 10 times per day (multiple times during the day and overnight which seems to be lessening somewhat since saturday. She has been having 20+ episodes of watery diarrhea which is orange/red. She has had bouts like this before with similar symptoms. ). The problem has been gradually improving. The emesis has an appearance of stomach contents. There has been no fever. Associated symptoms include abdominal pain (lower abdominal pain), arthralgias (chronic and stable), chest pain (chronic and stable), chills, diarrhea and dizziness (only immediately after vomiting which resolves in 5-10 minutes. ). Pertinent negatives include no coughing, fever, headaches, myalgias, sweats, URI or weight loss. Risk factors: History of diverticulitis but this does not feel that severe to her.  She has tried increased fluids (peptobismol) for the symptoms. The treatment provided mild relief.        Review of Systems   Constitutional: Positive for chills. Negative for fever and unexpected weight loss.   Respiratory: Negative for cough.    Cardiovascular: Positive for chest pain (chronic and stable).   Gastrointestinal: Positive for abdominal pain (lower abdominal pain), blood in stool, diarrhea, nausea and vomiting. Negative for constipation, GERD and indigestion.   Genitourinary: Negative for difficulty urinating, dysuria and frequency.   Musculoskeletal: Positive for arthralgias (chronic and stable). Negative for myalgias.   Neurological: Positive for dizziness (only immediately after vomiting which resolves in 5-10 minutes. ).        The following portions of the  patient's history were reviewed and updated as appropriate: allergies, current medications, past family history, past medical history, past social history, past surgical history and problem list.    Current Outpatient Medications on File Prior to Visit   Medication Sig Dispense Refill   • atorvastatin (LIPITOR) 40 MG tablet Take 1 tablet by mouth Every Night. 30 tablet 5   • ibuprofen (ADVIL,MOTRIN) 800 MG tablet Take 800 mg by mouth Every 6 (Six) Hours As Needed for Mild Pain .     • levothyroxine (SYNTHROID, LEVOTHROID) 200 MCG tablet Take 1 tablet by mouth Daily. 30 tablet 5   • levothyroxine (SYNTHROID, LEVOTHROID) 25 MCG tablet Take 1 tablet by mouth Daily. Take with 200 mcg levothyroxine tablet. 30 tablet 3   • metFORMIN (GLUCOPHAGE) 500 MG tablet Take 1 tablet by mouth Daily With Breakfast. 30 tablet 5   • NIFEdipine CC (ADALAT CC) 60 MG 24 hr tablet Take 1 tablet by mouth Daily. 30 tablet 5   • nitroglycerin (NITROLINGUAL) 0.4 MG/SPRAY spray Place 1 spray under the tongue Every 5 (Five) Minutes As Needed for Chest Pain.     • valsartan-hydrochlorothiazide (DIOVAN-HCT) 80-12.5 MG per tablet Take 1 tablet by mouth Daily. 30 tablet 5   • [DISCONTINUED] Eliquis 5 MG tablet tablet Take 1 tablet by mouth Every 12 (Twelve) Hours. 60 tablet 5     No current facility-administered medications on file prior to visit.       Allergies   Allergen Reactions   • Adhesive Tape Other (See Comments)     blisters   • Darvon [Propoxyphene] GI Intolerance   • Lisinopril Nausea Only        There were no vitals taken for this visit.     Physical Exam  Constitutional:       General: She is not in acute distress.     Appearance: She is well-developed.   Pulmonary:      Effort: Pulmonary effort is normal. No respiratory distress.   Neurological:      Mental Status: She is alert and oriented to person, place, and time.   Psychiatric:         Behavior: Behavior normal.          Results for orders placed or performed in visit on 02/06/21    Comprehensive Metabolic Panel    Specimen: Blood   Result Value Ref Range    Glucose 85 65 - 99 mg/dL    BUN 12 6 - 20 mg/dL    Creatinine 1.03 (H) 0.57 - 1.00 mg/dL    Sodium 141 136 - 145 mmol/L    Potassium 4.0 3.5 - 5.2 mmol/L    Chloride 104 98 - 107 mmol/L    CO2 24.5 22.0 - 29.0 mmol/L    Calcium 10.0 8.6 - 10.5 mg/dL    Total Protein 7.0 6.0 - 8.5 g/dL    Albumin 4.20 3.50 - 5.20 g/dL    ALT (SGPT) 27 1 - 33 U/L    AST (SGOT) 23 1 - 32 U/L    Alkaline Phosphatase 89 39 - 117 U/L    Total Bilirubin 0.3 0.0 - 1.2 mg/dL    eGFR Non African Amer 57 (L) >60 mL/min/1.73    Globulin 2.8 gm/dL    A/G Ratio 1.5 g/dL    BUN/Creatinine Ratio 11.7 7.0 - 25.0    Anion Gap 12.5 5.0 - 15.0 mmol/L   TSH    Specimen: Blood   Result Value Ref Range    TSH 26.700 (H) 0.270 - 4.200 uIU/mL   CBC Auto Differential    Specimen: Blood   Result Value Ref Range    WBC 8.27 3.40 - 10.80 10*3/mm3    RBC 4.62 3.77 - 5.28 10*6/mm3    Hemoglobin 13.1 12.0 - 15.9 g/dL    Hematocrit 40.0 34.0 - 46.6 %    MCV 86.6 79.0 - 97.0 fL    MCH 28.4 26.6 - 33.0 pg    MCHC 32.8 31.5 - 35.7 g/dL    RDW 15.1 12.3 - 15.4 %    RDW-SD 47.7 37.0 - 54.0 fl    MPV 10.9 6.0 - 12.0 fL    Platelets 263 140 - 450 10*3/mm3    Neutrophil % 56.1 42.7 - 76.0 %    Lymphocyte % 36.9 19.6 - 45.3 %    Monocyte % 3.9 (L) 5.0 - 12.0 %    Eosinophil % 1.5 0.3 - 6.2 %    Basophil % 0.8 0.0 - 1.5 %    Immature Grans % 0.8 (H) 0.0 - 0.5 %    Neutrophils, Absolute 4.64 1.70 - 7.00 10*3/mm3    Lymphocytes, Absolute 3.05 0.70 - 3.10 10*3/mm3    Monocytes, Absolute 0.32 0.10 - 0.90 10*3/mm3    Eosinophils, Absolute 0.12 0.00 - 0.40 10*3/mm3    Basophils, Absolute 0.07 0.00 - 0.20 10*3/mm3    Immature Grans, Absolute 0.07 (H) 0.00 - 0.05 10*3/mm3    nRBC 0.0 0.0 - 0.2 /100 WBC   Lipid Panel    Specimen: Blood   Result Value Ref Range    Total Cholesterol 119 0 - 200 mg/dL    Triglycerides 96 0 - 150 mg/dL    HDL Cholesterol 34 (L) 40 - 60 mg/dL    LDL Cholesterol  67 0 - 100  mg/dL    VLDL Cholesterol 18 5 - 40 mg/dL    LDL/HDL Ratio 1.94    Hemoglobin A1c    Specimen: Blood   Result Value Ref Range    Hemoglobin A1C 6.31 (H) 4.80 - 5.60 %   Hepatitis C Antibody    Specimen: Blood   Result Value Ref Range    Hepatitis C Ab Non-Reactive Non-Reactive   POC Urinalysis Dipstick, Automated    Specimen: Urine   Result Value Ref Range    Color Yellow Yellow, Straw, Dark Yellow, Christie    Clarity, UA Clear Clear    Specific Gravity  1.025 1.005 - 1.030    pH, Urine 5.5 5.0 - 8.0    Leukocytes Negative Negative    Nitrite, UA Negative Negative    Protein, POC Trace (A) Negative mg/dL    Glucose, UA Negative Negative, 1000 mg/dL (3+) mg/dL    Ketones, UA Negative Negative    Urobilinogen, UA Normal Normal    Bilirubin Negative Negative    Blood, UA Trace (A) Negative    Lot Number [id number]     Expiration Date 11-30-21         Problems Addressed this Visit        Coag and Thromboembolic    History of DVT of lower extremity     History of multiple DVTs in the past.  Patient is on lifetime anticoagulation with Eliquis.  Patient has been unable to get a refill due to insurance changes.  [facility] will no longer take the prior authorization from her previous insurance and she needs a prior authorization for the new insurance.  Prescription was sent to [facility] I instructed the patient to try to pick it up and if they denied it to have them send it back to me so that I can complete the prior authorization.  Patient only has 2 days of medication left.  We do not have any samples in the office to give her.         Relevant Medications    Eliquis 5 MG tablet tablet       Gastrointestinal Abdominal     Gastroenteritis - Primary     Signs symptoms consistent with a viral gastroenteritis.  Patient does have history of diverticulosis and diverticulitis.  She states it is not feel as severe as diverticulitis has in the past.  She is very aware of when she needs to go to the ED as she has had multiple bouts in the past.   Patient was given prescription for Phenergan to use as needed.  RTC/ED precautions given.         Relevant Medications    promethazine (PHENERGAN) 25 MG tablet      Diagnoses       Codes Comments    Gastroenteritis    -  Primary ICD-10-CM: K52.9  ICD-9-CM: 558.9     History of DVT of lower extremity     ICD-10-CM: Z86.718  ICD-9-CM: V12.51           Return if symptoms worsen or fail to improve.    Parts of this office note have been dictated by voice recognition software. Grammatical and/or spelling errors may be present.This was an audio and video enabled telemedicine encounter.      Gordon Wilson MD   4/19/2021

## 2021-04-19 NOTE — ASSESSMENT & PLAN NOTE
Signs symptoms consistent with a viral gastroenteritis.  Patient does have history of diverticulosis and diverticulitis.  She states it is not feel as severe as diverticulitis has in the past.  She is very aware of when she needs to go to the ED as she has had multiple bouts in the past.  Patient was given prescription for Phenergan to use as needed.  RTC/ED precautions given.

## 2021-04-19 NOTE — PROGRESS NOTES
Jazmín Hicks    1970  [id number]    I have reviewed the e-Visit questionnaire and patient's answers, my assessment and plan are as follows:      HPI  Jazmín Hicks is a 50 y.o. with  presents with complaint of nausea, vomiting and diarrhea for since 4/17. She has missed work and needs an excuse. She works virtually, but has not been able to work today.  She feels like she has a stomach virus. She did have COVID-19 in January.    Review of Systems - Gastrointestinal ROS: positive for - nausea, vomiting and diarrhea that started on 4/17 with some mild abdominal pan. She has low grade fever of 100. Chills, body aches, mild nasal congestion   negative for - coughing, chest pain, shortness of breath       Diagnoses and all orders for this visit:    1. Acute gastroenteritis (Primary)    Other orders  -     ondansetron ODT (Zofran ODT) 4 MG disintegrating tablet; Place 1 tablet on the tongue Every 8 (Eight) Hours As Needed for Nausea or Vomiting.  Dispense: 10 tablet; Refill: 0        Any medications prescribed have been sent electronically to   [facility] [address] [place] [address] - [address] [place] [address] [address] - [phone]  - [phone] [place] [address]  Phone: [phone] Fax: [phone]      Time Documentation  Counseled patient  Counseling topics: diagnosis, treatment options, follow up plan and return instructions  Total encounter time: counseling time more than 50% of visit: 17        BONITA Wood  04/19/21  17:21 EDT

## 2021-04-19 NOTE — PATIENT INSTRUCTIONS
Drink plenty of fluids. Sip, do not gulp  Clear liquids or bland diet while having diarrhea   If you develop worsening abdominal pain go to the emergency room.   If symptoms do not improve in 2 days follow up at urgent care or primary care provider for an in person evaluation.       Viral Gastroenteritis, Adult    Viral gastroenteritis is also known as the stomach flu. This condition may affect your stomach, small intestine, and large intestine. It can cause sudden watery diarrhea, fever, and vomiting. This condition is caused by many different viruses. These viruses can be passed from person to person very easily (are contagious).  Diarrhea and vomiting can make you feel weak and cause you to become dehydrated. You may not be able to keep fluids down. Dehydration can make you tired and thirsty, cause you to have a dry mouth, and decrease how often you urinate. It is important to replace the fluids that you lose from diarrhea and vomiting.  What are the causes?  Gastroenteritis is caused by many viruses, including rotavirus and norovirus. Norovirus is the most common cause in adults. You can get sick after being exposed to the viruses from other people. You can also get sick by:  · Eating food, drinking water, or touching a surface contaminated with one of these viruses.  · Sharing utensils or other personal items with an infected person.  What increases the risk?  You are more likely to develop this condition if you:  · Have a weak body defense system (immune system).  · Live with one or more children who are younger than 2 years old.  · Live in a nursing home.  · Travel on cruise ships.  What are the signs or symptoms?  Symptoms of this condition start suddenly 1-3 days after exposure to a virus. Symptoms may last for a few days or for as long as a week. Common symptoms include watery diarrhea and vomiting. Other symptoms include:  · Fever.  · Headache.  · Fatigue.  · Pain in the  abdomen.  · Chills.  · Weakness.  · Nausea.  · Muscle aches.  · Loss of appetite.  How is this diagnosed?  This condition is diagnosed with a medical history and physical exam. You may also have a stool test to check for viruses or other infections.  How is this treated?  This condition typically goes away on its own. The focus of treatment is to prevent dehydration and restore lost fluids (rehydration). This condition may be treated with:  · An oral rehydration solution (ORS) to replace important salts and minerals (electrolytes) in your body. Take this if told by your health care provider. This is a drink that is sold at pharmacies and retail stores.  · Medicines to help with your symptoms.  · Probiotic supplements to reduce symptoms of diarrhea.  · Fluids given through an IV, if dehydration is severe.  Older adults and people with other diseases or a weak immune system are at higher risk for dehydration.  Follow these instructions at home:    Eating and drinking    · Take an ORS as told by your health care provider.  · Drink clear fluids in small amounts as you are able. Clear fluids include:  ? Water.  ? Ice chips.  ? Diluted fruit juice.  ? Low-calorie sports drinks.  · Drink enough fluid to keep your urine pale yellow.  · Eat small amounts of healthy foods every 3-4 hours as you are able. This may include whole grains, fruits, vegetables, lean meats, and yogurt.  · Avoid fluids that contain a lot of sugar or caffeine, such as energy drinks, sports drinks, and soda.  · Avoid spicy or fatty foods.  · Avoid alcohol.  General instructions  · Wash your hands often, especially after having diarrhea or vomiting. If soap and water are not available, use hand .  · Make sure that all people in your household wash their hands well and often.  · Take over-the-counter and prescription medicines only as told by your health care provider.  · Rest at home while you recover.  · Watch your condition for any  changes.  · Take a warm bath to relieve any burning or pain from frequent diarrhea episodes.  · Keep all follow-up visits as told by your health care provider. This is important.  Contact a health care provider if you:  · Cannot keep fluids down.  · Have symptoms that get worse.  · Have new symptoms.  · Feel light-headed or dizzy.  · Have muscle cramps.  Get help right away if you:  · Have chest pain.  · Feel extremely weak or you faint.  · See blood in your vomit.  · Have vomit that looks like coffee grounds.  · Have bloody or black stools or stools that look like tar.  · Have a severe headache, a stiff neck, or both.  · Have a rash.  · Have severe pain, cramping, or bloating in your abdomen.  · Have trouble breathing or you are breathing very quickly.  · Have a fast heartbeat.  · Have skin that feels cold and clammy.  · Feel confused.  · Have pain when you urinate.  · Have signs of dehydration, such as:  ? Dark urine, very little urine, or no urine.  ? Cracked lips.  ? Dry mouth.  ? Sunken eyes.  ? Sleepiness.  ? Weakness.  Summary  · Viral gastroenteritis is also known as the stomach flu. It can cause sudden watery diarrhea, fever, and vomiting.  · This condition can be passed from person to person very easily (is contagious).  · Take an ORS if told by your health care provider. This is a drink that is sold at pharmacies and retail stores.  · Wash your hands often, especially after having diarrhea or vomiting. If soap and water are not available, use hand .  This information is not intended to replace advice given to you by your health care provider. Make sure you discuss any questions you have with your health care provider.  Document Revised: 06/05/2020 Document Reviewed: 10/23/2019  [employer] Patient Education © 2021 [employer]

## 2021-04-19 NOTE — ASSESSMENT & PLAN NOTE
History of multiple DVTs in the past.  Patient is on lifetime anticoagulation with Eliquis.  Patient has been unable to get a refill due to insurance changes.  [facility] will no longer take the prior authorization from her previous insurance and she needs a prior authorization for the new insurance.  Prescription was sent to [facility] I instructed the patient to try to pick it up and if they denied it to have them send it back to me so that I can complete the prior authorization.  Patient only has 2 days of medication left.  We do not have any samples in the office to give her.

## 2021-04-22 ENCOUNTER — TELEPHONE (OUTPATIENT)
Dept: FAMILY MEDICINE CLINIC | Facility: CLINIC | Age: 51
End: 2021-04-22

## 2021-04-22 NOTE — TELEPHONE ENCOUNTER
----- Message from Christie Burgos sent at 4/22/2021 12:03 PM EDT -----  [facility] [address] wants to know if the PA has been started or completed on eliquis 5mg the patient is out of the medication now and needs it ASAP

## 2021-04-26 ENCOUNTER — TELEPHONE (OUTPATIENT)
Dept: FAMILY MEDICINE CLINIC | Facility: CLINIC | Age: 51
End: 2021-04-26

## 2021-04-26 NOTE — TELEPHONE ENCOUNTER
----- Message from BONITA Castellano sent at 4/25/2021  3:04 PM EDT -----  Regarding: eliquis  Please make sure that patient was able to  her Eliquis. The insurance was supposed to have approved the PA.

## 2021-05-10 ENCOUNTER — TELEPHONE (OUTPATIENT)
Dept: FAMILY MEDICINE CLINIC | Facility: CLINIC | Age: 51
End: 2021-05-10

## 2021-05-10 ENCOUNTER — APPOINTMENT (OUTPATIENT)
Dept: GENERAL RADIOLOGY | Facility: HOSPITAL | Age: 51
End: 2021-05-10

## 2021-05-10 ENCOUNTER — APPOINTMENT (OUTPATIENT)
Dept: CT IMAGING | Facility: HOSPITAL | Age: 51
End: 2021-05-10

## 2021-05-10 ENCOUNTER — HOSPITAL ENCOUNTER (EMERGENCY)
Facility: HOSPITAL | Age: 51
Discharge: HOME | End: 2021-05-10
Attending: EMERGENCY MEDICINE | Admitting: EMERGENCY MEDICINE

## 2021-05-10 VITALS
HEART RATE: 93 BPM | DIASTOLIC BLOOD PRESSURE: 83 MMHG | HEIGHT: 66 IN | WEIGHT: 293 LBS | TEMPERATURE: 98.2 F | BODY MASS INDEX: 47.09 KG/M2 | RESPIRATION RATE: 18 BRPM | OXYGEN SATURATION: 94 % | SYSTOLIC BLOOD PRESSURE: 126 MMHG

## 2021-05-10 DIAGNOSIS — Z86.16 HISTORY OF COVID-19: ICD-10-CM

## 2021-05-10 DIAGNOSIS — R07.9 CHEST PAIN, UNSPECIFIED TYPE: Primary | ICD-10-CM

## 2021-05-10 LAB
ALBUMIN SERPL-MCNC: 3.9 G/DL (ref 3.5–5.2)
ALBUMIN/GLOB SERPL: 1.3 G/DL
ALP SERPL-CCNC: 118 U/L (ref 39–117)
ALT SERPL W P-5'-P-CCNC: 18 U/L (ref 1–33)
ANION GAP SERPL CALCULATED.3IONS-SCNC: 11 MMOL/L (ref 5–15)
AST SERPL-CCNC: 15 U/L (ref 1–32)
BASOPHILS # BLD AUTO: 0.04 10*3/MM3 (ref 0–0.2)
BASOPHILS NFR BLD AUTO: 0.5 % (ref 0–1.5)
BILIRUB SERPL-MCNC: 0.4 MG/DL (ref 0–1.2)
BUN SERPL-MCNC: 8 MG/DL (ref 6–20)
BUN/CREAT SERPL: 9 (ref 7–25)
CALCIUM SPEC-SCNC: 9.4 MG/DL (ref 8.6–10.5)
CHLORIDE SERPL-SCNC: 104 MMOL/L (ref 98–107)
CO2 SERPL-SCNC: 26 MMOL/L (ref 22–29)
CREAT SERPL-MCNC: 0.89 MG/DL (ref 0.57–1)
DEPRECATED RDW RBC AUTO: 42.5 FL (ref 37–54)
EOSINOPHIL # BLD AUTO: 0.09 10*3/MM3 (ref 0–0.4)
EOSINOPHIL NFR BLD AUTO: 1.1 % (ref 0.3–6.2)
ERYTHROCYTE [DISTWIDTH] IN BLOOD BY AUTOMATED COUNT: 13.8 % (ref 12.3–15.4)
GFR SERPL CREATININE-BSD FRML MDRD: 67 ML/MIN/1.73
GLOBULIN UR ELPH-MCNC: 3.1 GM/DL
GLUCOSE SERPL-MCNC: 131 MG/DL (ref 65–99)
HCT VFR BLD AUTO: 41.6 % (ref 34–46.6)
HGB BLD-MCNC: 13.6 G/DL (ref 12–15.9)
HOLD SPECIMEN: NORMAL
IMM GRANULOCYTES # BLD AUTO: 0.04 10*3/MM3 (ref 0–0.05)
IMM GRANULOCYTES NFR BLD AUTO: 0.5 % (ref 0–0.5)
INR PPP: 0.96 (ref 0.85–1.16)
LIPASE SERPL-CCNC: 16 U/L (ref 13–60)
LYMPHOCYTES # BLD AUTO: 2.84 10*3/MM3 (ref 0.7–3.1)
LYMPHOCYTES NFR BLD AUTO: 35.5 % (ref 19.6–45.3)
MCH RBC QN AUTO: 28.2 PG (ref 26.6–33)
MCHC RBC AUTO-ENTMCNC: 32.7 G/DL (ref 31.5–35.7)
MCV RBC AUTO: 86.1 FL (ref 79–97)
MONOCYTES # BLD AUTO: 0.35 10*3/MM3 (ref 0.1–0.9)
MONOCYTES NFR BLD AUTO: 4.4 % (ref 5–12)
NEUTROPHILS NFR BLD AUTO: 4.63 10*3/MM3 (ref 1.7–7)
NEUTROPHILS NFR BLD AUTO: 58 % (ref 42.7–76)
NRBC BLD AUTO-RTO: 0 /100 WBC (ref 0–0.2)
NT-PROBNP SERPL-MCNC: 16.8 PG/ML (ref 0–450)
PLATELET # BLD AUTO: 304 10*3/MM3 (ref 140–450)
PMV BLD AUTO: 10.1 FL (ref 6–12)
POTASSIUM SERPL-SCNC: 3.6 MMOL/L (ref 3.5–5.2)
PROT SERPL-MCNC: 7 G/DL (ref 6–8.5)
PROTHROMBIN TIME: 12.5 SECONDS (ref 11.4–14.4)
QT INTERVAL: 360 MS
QT INTERVAL: 388 MS
QTC INTERVAL: 466 MS
QTC INTERVAL: 471 MS
RBC # BLD AUTO: 4.83 10*6/MM3 (ref 3.77–5.28)
SODIUM SERPL-SCNC: 141 MMOL/L (ref 136–145)
TROPONIN T SERPL-MCNC: <0.01 NG/ML (ref 0–0.03)
TROPONIN T SERPL-MCNC: <0.01 NG/ML (ref 0–0.03)
WBC # BLD AUTO: 7.99 10*3/MM3 (ref 3.4–10.8)
WHOLE BLOOD HOLD SPECIMEN: NORMAL
WHOLE BLOOD HOLD SPECIMEN: NORMAL

## 2021-05-10 PROCEDURE — 71275 CT ANGIOGRAPHY CHEST: CPT

## 2021-05-10 PROCEDURE — 85610 PROTHROMBIN TIME: CPT | Performed by: EMERGENCY MEDICINE

## 2021-05-10 PROCEDURE — 93005 ELECTROCARDIOGRAM TRACING: CPT | Performed by: EMERGENCY MEDICINE

## 2021-05-10 PROCEDURE — 83690 ASSAY OF LIPASE: CPT | Performed by: EMERGENCY MEDICINE

## 2021-05-10 PROCEDURE — 84484 ASSAY OF TROPONIN QUANT: CPT | Performed by: EMERGENCY MEDICINE

## 2021-05-10 PROCEDURE — 0 IOPAMIDOL PER 1 ML: Performed by: EMERGENCY MEDICINE

## 2021-05-10 PROCEDURE — 80053 COMPREHEN METABOLIC PANEL: CPT | Performed by: EMERGENCY MEDICINE

## 2021-05-10 PROCEDURE — 99284 EMERGENCY DEPT VISIT MOD MDM: CPT

## 2021-05-10 PROCEDURE — 71045 X-RAY EXAM CHEST 1 VIEW: CPT

## 2021-05-10 PROCEDURE — 83880 ASSAY OF NATRIURETIC PEPTIDE: CPT | Performed by: EMERGENCY MEDICINE

## 2021-05-10 PROCEDURE — 85025 COMPLETE CBC W/AUTO DIFF WBC: CPT | Performed by: EMERGENCY MEDICINE

## 2021-05-10 RX ORDER — MULTIPLE VITAMINS W/ MINERALS TAB 9MG-400MCG
1 TAB ORAL DAILY
Status: ON HOLD | COMMUNITY
End: 2022-07-23

## 2021-05-10 RX ORDER — SODIUM CHLORIDE 0.9 % (FLUSH) 0.9 %
10 SYRINGE (ML) INJECTION AS NEEDED
Status: DISCONTINUED | OUTPATIENT
Start: 2021-05-10 | End: 2021-05-10 | Stop reason: HOSPADM

## 2021-05-10 RX ORDER — ASPIRIN 81 MG/1
324 TABLET, CHEWABLE ORAL ONCE
Status: DISCONTINUED | OUTPATIENT
Start: 2021-05-10 | End: 2021-05-10 | Stop reason: HOSPADM

## 2021-05-10 RX ORDER — ASPIRIN 81 MG/1
81 TABLET ORAL DAILY
Status: ON HOLD | COMMUNITY
End: 2022-07-23

## 2021-05-10 RX ADMIN — IOPAMIDOL 65 ML: 755 INJECTION, SOLUTION INTRAVENOUS at 14:01

## 2021-05-10 NOTE — TELEPHONE ENCOUNTER
Caller: Jazmín Hicks    Relationship to patient: Self    Best call back number: [phone]    Chief complaint: Patient called having heart palpitations and shortness of breath.  I RECOMMOMENDED SHE GO TO THE EMERGENCY ROOM.  SHE TOLD ME SHE HAS SOMEONE WHO CAN TAKE HER.  SHE IS NEW TO [facility] AND HAS A NEW PATEINT APPONTMENT WITH CARDIOLOGIST ANDRES GUERRERO 06/18/21.    Patient directed to call 911 or go to their nearest emergency room.     Patient verbalized understanding: [x] Yes  [] No  If no, why?

## 2021-05-25 NOTE — PROGRESS NOTES
OFFICE VISIT  NOTE  [facility] CARDIOLOGY      Name: Jazmín Hicks    Date: 2021  MRN:  [id number]  :  1970      REFERRING/PRIMARY PROVIDER:  Isabel Hidalgo APRN    Chief Complaint   Patient presents with   • Coronary Artery Disease   • Hypertension       HPI: Jazmín Hicks is a 51 y.o. female who presents today for new consultation for CAD.  She reports a history of myocardial infarction is with last cath in  in [facility] showed coronary ectasia of the LAD, normal RCA and circumflex, EF 65% at that time.  She has a history of Lyme's disease, COVID-19 2020, worsening palpitations since that time.  She has been off metoprolol for the last 2 years, unsure why, did well on it in the past, resting heart rate 100 today.  She does report shortness of breath, worsening over the past 1 to 2 weeks, no chest pain.  Recent ER visit for palpitations.    Past Medical History:   Diagnosis Date   • Anxiety    • Cardiovascular disease    • Depression    • Diverticulitis    • DVT (deep venous thrombosis) (CMS/[facility])    • Hashimoto's disease    • Heart attack (CMS/HCC)     x5   • Hypertension    • Hypothyroidism    • Lyme disease    • OCD (obsessive compulsive disorder)    • Sleep apnea        Past Surgical History:   Procedure Laterality Date   • BILATERAL BREAST REDUCTION     • CARDIAC CATHETERIZATION  10/2019   • CHOLECYSTECTOMY     • KNEE SURGERY     • TONSILLECTOMY         Social History     Socioeconomic History   • Marital status:      Spouse name: Not on file   • Number of children: Not on file   • Years of education: Not on file   • Highest education level: Not on file   Tobacco Use   • Smoking status: Former Smoker   • Smokeless tobacco: Never Used   • Tobacco comment: quit in 2017   Substance and Sexual Activity   • Alcohol use: Yes     Comment: wine 3x month   • Drug use: Never   • Sexual activity: Yes     Birth control/protection: I.U.D.     Comment: Mirena        Family History   Problem Relation Age of Onset   • Cancer Mother         multiple myoloma    • Hypertension Mother    • Thyroid disease Mother    • Hypothyroidism Mother    • Colon polyps Mother    • Heart disease Father    • Hypertension Father    • Deep vein thrombosis Father    • Hypothyroidism Father    • Heart attack Father    • Hypothyroidism Sister    • Hypertension Sister    • Heart disease Sister    • Cancer Sister         colorectal   • Colon polyps Sister    • Heart attack Sister    • Hypothyroidism Brother    • Hypertension Brother    • Heart disease Brother    • Colon polyps Brother    • Heart attack Brother    • Hypothyroidism Daughter    • Scoliosis Daughter    • Depression Daughter    • Hypertension Maternal Grandmother    • Cancer Maternal Grandmother         2 rounds of breast   • No Known Problems Maternal Grandfather    • Diabetes Paternal Grandmother    • Stroke Paternal Grandmother    • Hypertension Paternal Grandmother    • Heart disease Paternal Grandfather    • Heart attack Paternal Grandfather    • Hypothyroidism Sister    • Hypertension Sister    • Colon polyps Sister    • Hypothyroidism Brother    • Hypertension Brother    • Heart disease Brother    • Colon polyps Brother    • Heart attack Brother    • Hypothyroidism Daughter    • Anxiety disorder Daughter    • Depression Daughter         ROS:   Constitutional no fever,  no weight loss   Skin no rash, no subcutaneous nodules   Otolaryngeal no difficulty swallowing   Cardiovascular See HPI   Pulmonary no cough, no sputum production   Gastrointestinal no constipation, no diarrhea   Genitourinary no dysuria, no hematuria   Hematologic no easy bruisability, no abnormal bleeding   Musculoskeletal no muscle pain   Neurologic no dizziness, no falls         Allergies   Allergen Reactions   • Adhesive Tape Other (See Comments)     blisters   • Darvon [Propoxyphene] GI Intolerance   • Lisinopril Nausea Only         Current Outpatient  Medications:   •  aspirin 81 MG EC tablet, Take 81 mg by mouth Daily., Disp: , Rfl:   •  atorvastatin (LIPITOR) 40 MG tablet, Take 1 tablet by mouth Every Night., Disp: 30 tablet, Rfl: 5  •  Eliquis 5 MG tablet tablet, Take 1 tablet by mouth Every 12 (Twelve) Hours., Disp: 60 tablet, Rfl: 5  •  ibuprofen (ADVIL,MOTRIN) 800 MG tablet, Take 800 mg by mouth Every 6 (Six) Hours As Needed for Mild Pain ., Disp: , Rfl:   •  levothyroxine (SYNTHROID, LEVOTHROID) 200 MCG tablet, Take 1 tablet by mouth Daily., Disp: 30 tablet, Rfl: 5  •  levothyroxine (SYNTHROID, LEVOTHROID) 25 MCG tablet, Take 1 tablet by mouth Daily. Take with 200 mcg levothyroxine tablet., Disp: 30 tablet, Rfl: 3  •  MAGNESIUM PO, Take  by mouth., Disp: , Rfl:   •  metFORMIN (GLUCOPHAGE) 500 MG tablet, Take 1 tablet by mouth Daily With Breakfast., Disp: 30 tablet, Rfl: 5  •  multivitamin with minerals (MULTIVITAMIN ADULT PO), Take 1 tablet by mouth Daily., Disp: , Rfl:   •  NIFEdipine CC (ADALAT CC) 60 MG 24 hr tablet, Take 1 tablet by mouth Daily., Disp: 30 tablet, Rfl: 5  •  nitroglycerin (NITROLINGUAL) 0.4 MG/SPRAY spray, Place 1 spray under the tongue Every 5 (Five) Minutes As Needed for Chest Pain., Disp: , Rfl:   •  ondansetron (Zofran) 4 MG tablet, Take 1 tablet by mouth Every 8 (Eight) Hours As Needed for Nausea or Vomiting., Disp: 10 tablet, Rfl: 0  •  POTASSIUM PO, Take  by mouth., Disp: , Rfl:   •  promethazine (PHENERGAN) 25 MG tablet, Take 1 tablet by mouth Every 6 (Six) Hours As Needed for Nausea or Vomiting., Disp: 30 tablet, Rfl: 0  •  valsartan-hydrochlorothiazide (DIOVAN-HCT) 80-12.5 MG per tablet, Take 1 tablet by mouth Daily., Disp: 30 tablet, Rfl: 5  •  metoprolol tartrate (LOPRESSOR) 25 MG tablet, Take 1 tablet by mouth 2 (Two) Times a Day., Disp: 60 tablet, Rfl: 11    Vitals:    05/27/21 1459   BP: 118/72   BP Location: Left arm   Patient Position: Sitting   Pulse: 102   Temp: 98 °F (36.7 °C)   SpO2: 97%   Weight: (!) 152 kg (334  "lb)   Height: 167.6 cm (66\")     Body mass index is 53.91 kg/m².    PHYSICAL EXAM:    General Appearance:   · well developed  · well nourished  HENT:   · oropharynx moist  · lips not cyanotic  Neck:  · thyroid not enlarged  · supple  Respiratory:  · no respiratory distress  · normal breath sounds  · no rales  Cardiovascular:  · no jugular venous distention  · regular rhythm  · apical impulse normal  · S1 normal, S2 normal  · no S3, no S4   · no murmur  · no rub, no thrill  · carotid pulses normal; no bruit  · pedal pulses normal  · lower extremity edema: none    Gastrointestinal:   · bowel sounds normal  · non-tender  · no hepatomegaly, no splenomegaly  Musculoskeletal:  · no clubbing of fingers.   · normocephalic, head atraumatic  Skin:   · warm, dry  Psychiatric:  · judgement and insight appropriate  · normal mood and affect    RESULTS:   Procedures          Labs:  Lab Results   Component Value Date    CHOL 119 02/06/2021    TRIG 96 02/06/2021    HDL 34 (L) 02/06/2021    LDL 67 02/06/2021    AST 15 05/10/2021    ALT 18 05/10/2021     Lab Results   Component Value Date    HGBA1C 6.31 (H) 02/06/2021     No components found for: CREATINININE  eGFR Non  Amer   Date Value Ref Range Status   05/10/2021 67 >60 mL/min/1.73 Final   02/06/2021 57 (L) >60 mL/min/1.73 Final       Most recent PCP note, imaging tests, and labs reviewed.    ASSESSMENT:  Problem List Items Addressed This Visit        Cardiac and Vasculature    Hypertension - Primary (Chronic)    Relevant Medications    metoprolol tartrate (LOPRESSOR) 25 MG tablet    Other Relevant Orders    Adult Transthoracic Echo Complete W/ Cont if Necessary Per Protocol    Coronary artery disease of native artery of native heart with stable angina pectoris (CMS/HCC)    Overview     8/9/2019 [facility]: LCMA within normal limits, ectasia 50% ostial lesion in LAD, CIRC within normal limits, RCA within normal limits. EF 65%    8/9/19 Lexiscan: Medium, partially " reversible defect in the mid inferolateral, mid anterolateral, apical lateral segments. The extent of this perfusion defect was mild to moderate. LVEF 73%         Relevant Medications    metoprolol tartrate (LOPRESSOR) 25 MG tablet    Other Relevant Orders    Adult Transthoracic Echo Complete W/ Cont if Necessary Per Protocol       Coag and Thromboembolic    History of DVT of lower extremity       Endocrine and Metabolic    Morbidly obese (CMS/HCC) (Chronic)      Other Visit Diagnoses     SOB (shortness of breath)        Relevant Orders    Adult Transthoracic Echo Complete W/ Cont if Necessary Per Protocol    Chest pain, unspecified type        Relevant Orders    Adult Transthoracic Echo Complete W/ Cont if Necessary Per Protocol          PLAN:    1.  Coronary atherosclerosis:  Possible previous embolic MI  Continue Eliquis 5 mg twice daily  Resume metoprolol 25 mg twice daily  Continue atorvastatin 40 mg daily  Continue aspirin 81 mg daily    2.  Dyspnea on exertion:  Check echocardiogram.    3.  Palpitations:  Resume metoprolol 25 mg twice daily  If symptoms worsen, place 14-day Holter monitor.      Return to clinic in 6 months, or sooner as needed.    Thank you for the opportunity to share in the care of your patient; please do not hesitate to call me with any questions.     Pratik White MD, [facility]  Office: [phone] [address] [place] [address]    05/27/21

## 2021-05-27 ENCOUNTER — OFFICE VISIT (OUTPATIENT)
Dept: CARDIOLOGY | Facility: CLINIC | Age: 51
End: 2021-05-27

## 2021-05-27 VITALS
SYSTOLIC BLOOD PRESSURE: 118 MMHG | HEART RATE: 102 BPM | HEIGHT: 66 IN | TEMPERATURE: 98 F | DIASTOLIC BLOOD PRESSURE: 72 MMHG | WEIGHT: 293 LBS | OXYGEN SATURATION: 97 % | BODY MASS INDEX: 47.09 KG/M2

## 2021-05-27 DIAGNOSIS — I25.118 CORONARY ARTERY DISEASE OF NATIVE ARTERY OF NATIVE HEART WITH STABLE ANGINA PECTORIS (HCC): ICD-10-CM

## 2021-05-27 DIAGNOSIS — R06.02 SOB (SHORTNESS OF BREATH): ICD-10-CM

## 2021-05-27 DIAGNOSIS — Z86.718 HISTORY OF DVT OF LOWER EXTREMITY: ICD-10-CM

## 2021-05-27 DIAGNOSIS — R07.9 CHEST PAIN, UNSPECIFIED TYPE: ICD-10-CM

## 2021-05-27 DIAGNOSIS — E66.01 MORBIDLY OBESE (HCC): Chronic | ICD-10-CM

## 2021-05-27 DIAGNOSIS — I10 ESSENTIAL HYPERTENSION: Primary | Chronic | ICD-10-CM

## 2021-05-27 PROCEDURE — 99204 OFFICE O/P NEW MOD 45 MIN: CPT | Performed by: INTERNAL MEDICINE

## 2021-05-27 RX ORDER — ONDANSETRON 4 MG/1
TABLET, ORALLY DISINTEGRATING ORAL
COMMUNITY
Start: 2021-04-23 | End: 2021-05-27

## 2021-06-22 ENCOUNTER — APPOINTMENT (OUTPATIENT)
Dept: MAMMOGRAPHY | Facility: HOSPITAL | Age: 51
End: 2021-06-22

## 2021-07-01 ENCOUNTER — APPOINTMENT (OUTPATIENT)
Dept: MAMMOGRAPHY | Facility: HOSPITAL | Age: 51
End: 2021-07-01

## 2021-07-08 ENCOUNTER — APPOINTMENT (OUTPATIENT)
Dept: CARDIOLOGY | Facility: HOSPITAL | Age: 51
End: 2021-07-08

## 2021-08-04 ENCOUNTER — APPOINTMENT (OUTPATIENT)
Dept: CARDIOLOGY | Facility: HOSPITAL | Age: 51
End: 2021-08-04

## 2021-08-11 ENCOUNTER — APPOINTMENT (OUTPATIENT)
Dept: MAMMOGRAPHY | Facility: HOSPITAL | Age: 51
End: 2021-08-11

## 2021-08-11 PROCEDURE — U0004 COV-19 TEST NON-CDC HGH THRU: HCPCS | Performed by: NURSE PRACTITIONER

## 2021-08-16 DIAGNOSIS — E06.3 HYPOTHYROIDISM DUE TO HASHIMOTO'S THYROIDITIS: ICD-10-CM

## 2021-08-16 DIAGNOSIS — E03.8 HYPOTHYROIDISM DUE TO HASHIMOTO'S THYROIDITIS: ICD-10-CM

## 2021-08-16 RX ORDER — LEVOTHYROXINE SODIUM 0.03 MG/1
25 TABLET ORAL
Qty: 30 TABLET | Refills: 0 | Status: SHIPPED | OUTPATIENT
Start: 2021-08-16 | End: 2021-10-01 | Stop reason: SDUPTHER

## 2021-08-18 ENCOUNTER — PREP FOR SURGERY (OUTPATIENT)
Dept: OTHER | Facility: HOSPITAL | Age: 51
End: 2021-08-18

## 2021-08-18 ENCOUNTER — OFFICE VISIT (OUTPATIENT)
Dept: GASTROENTEROLOGY | Facility: CLINIC | Age: 51
End: 2021-08-18

## 2021-08-18 VITALS
DIASTOLIC BLOOD PRESSURE: 66 MMHG | BODY MASS INDEX: 57.41 KG/M2 | WEIGHT: 293 LBS | HEART RATE: 91 BPM | SYSTOLIC BLOOD PRESSURE: 105 MMHG

## 2021-08-18 DIAGNOSIS — R19.7 DIARRHEA, UNSPECIFIED TYPE: Primary | ICD-10-CM

## 2021-08-18 DIAGNOSIS — Z80.0 FAMILY HX OF COLON CANCER: ICD-10-CM

## 2021-08-18 DIAGNOSIS — Z80.0 FAMILY HISTORY OF COLON CANCER: Primary | ICD-10-CM

## 2021-08-18 DIAGNOSIS — Z12.11 SCREEN FOR COLON CANCER: ICD-10-CM

## 2021-08-18 PROCEDURE — 99214 OFFICE O/P EST MOD 30 MIN: CPT | Performed by: NURSE PRACTITIONER

## 2021-08-18 RX ORDER — MONTELUKAST SODIUM 4 MG/1
2 TABLET, CHEWABLE ORAL 2 TIMES DAILY
Qty: 120 TABLET | Refills: 5 | Status: ON HOLD | OUTPATIENT
Start: 2021-08-18 | End: 2022-07-23

## 2021-08-18 NOTE — PROGRESS NOTES
GASTROENTEROLOGY OFFICE NOTE  Jazmín Hicks  [id number]  1970    CARE TEAM  Patient Care Team:  Isabel Hidalgo APRN as PCP - General (Family Medicine)  Brook Geren MD as Consulting Physician (Obstetrics and Gynecology)    Referring Provider: Isabel Hidalgo APRN    Chief Complaint   Patient presents with   • Diarrhea        HISTORY OF PRESENT ILLNESS:  Ms. Hicks is a 51-year-old female who presents today to establish care with a GI provider.  She has relocated from [place] to [place] recently.  She has history of multiple chronic conditions including coronary artery disease, hypertension, hypothyroidism, PCOS, obstructive sleep apnea and history of DVT for which she is currently taking Eliquis.  She has a history of diverticulitis, it sounds as if she has had complicated diverticulitis in the past.  She was hospitalized on 2 separate occasions while living in [place].  She reports that on her last hospitalization in 2020 for diverticulitis she was told that the infection was so bad that they would typically do surgery but due to COVID, they were treating only with IV antibiotics in lieu of surgery.    She has complaints of chronic diarrhea, this has been an issue for her most of her life.  She reports that on a good day she has 10 loose bowel movements daily.  She has nocturnal diarrhea most every night.  Her abdomen always feels sore but at times she has worsening abdominal pain associated with bowel movements.  She has a lot of nausea.    She has a family history of colorectal cancer in her sister who was diagnosed at the age of 57 another sister and brother with a history of adenomatous colon polyps.  The patient has never had a colonoscopy.    PAST MEDICAL HISTORY  Past Medical History:   Diagnosis Date   • Anxiety    • Cardiovascular disease    • Depression    • Diverticulitis    • DVT (deep venous thrombosis) (CMS/HCC)    • Hashimoto's disease    • Heart attack (CMS/HCC)     x5   •  Hypertension    • Hypothyroidism    • Lyme disease    • OCD (obsessive compulsive disorder)    • Sleep apnea         PAST SURGICAL HISTORY  Past Surgical History:   Procedure Laterality Date   • BILATERAL BREAST REDUCTION     • CARDIAC CATHETERIZATION  10/2019   • CHOLECYSTECTOMY     • KNEE SURGERY     • TONSILLECTOMY          MEDICATIONS:    Current Outpatient Medications:   •  aspirin 81 MG EC tablet, Take 81 mg by mouth Daily., Disp: , Rfl:   •  atorvastatin (LIPITOR) 40 MG tablet, Take 1 tablet by mouth Every Night., Disp: 30 tablet, Rfl: 5  •  benzonatate (TESSALON) 200 MG capsule, Take 1 capsule by mouth 3 (Three) Times a Day As Needed for Cough., Disp: 30 capsule, Rfl: 0  •  Eliquis 5 MG tablet tablet, Take 1 tablet by mouth Every 12 (Twelve) Hours., Disp: 60 tablet, Rfl: 5  •  ibuprofen (ADVIL,MOTRIN) 800 MG tablet, Take 800 mg by mouth Every 6 (Six) Hours As Needed for Mild Pain ., Disp: , Rfl:   •  levothyroxine (SYNTHROID, LEVOTHROID) 200 MCG tablet, Take 1 tablet by mouth Daily., Disp: 30 tablet, Rfl: 5  •  levothyroxine (SYNTHROID, LEVOTHROID) 25 MCG tablet, Take 1 tablet by mouth Every Morning. Must make appointment for further refills., Disp: 30 tablet, Rfl: 0  •  MAGNESIUM PO, Take  by mouth., Disp: , Rfl:   •  metFORMIN (GLUCOPHAGE) 500 MG tablet, Take 1 tablet by mouth Daily With Breakfast., Disp: 30 tablet, Rfl: 5  •  metoprolol tartrate (LOPRESSOR) 25 MG tablet, Take 1 tablet by mouth 2 (Two) Times a Day., Disp: 60 tablet, Rfl: 11  •  multivitamin with minerals (MULTIVITAMIN ADULT PO), Take 1 tablet by mouth Daily., Disp: , Rfl:   •  NIFEdipine CC (ADALAT CC) 60 MG 24 hr tablet, Take 1 tablet by mouth Daily., Disp: 30 tablet, Rfl: 5  •  nitroglycerin (NITROLINGUAL) 0.4 MG/SPRAY spray, Place 1 spray under the tongue Every 5 (Five) Minutes As Needed for Chest Pain., Disp: , Rfl:   •  ondansetron (Zofran) 4 MG tablet, Take 1 tablet by mouth Every 8 (Eight) Hours As Needed for Nausea or Vomiting.,  Disp: 10 tablet, Rfl: 0  •  POTASSIUM PO, Take  by mouth., Disp: , Rfl:   •  promethazine (PHENERGAN) 25 MG tablet, Take 1 tablet by mouth Every 6 (Six) Hours As Needed for Nausea or Vomiting., Disp: 30 tablet, Rfl: 0  •  valsartan-hydrochlorothiazide (DIOVAN-HCT) 80-12.5 MG per tablet, Take 1 tablet by mouth Daily., Disp: 30 tablet, Rfl: 5    ALLERGIES  Allergies   Allergen Reactions   • Adhesive Tape Other (See Comments)     blisters   • Darvon [Propoxyphene] GI Intolerance   • Lisinopril Nausea Only       FAMILY HISTORY:  Family History   Problem Relation Age of Onset   • Cancer Mother         multiple myoloma    • Hypertension Mother    • Thyroid disease Mother    • Hypothyroidism Mother    • Colon polyps Mother    • Heart disease Father    • Hypertension Father    • Deep vein thrombosis Father    • Hypothyroidism Father    • Heart attack Father    • Hypothyroidism Sister    • Hypertension Sister    • Heart disease Sister    • Cancer Sister         colorectal   • Colon polyps Sister    • Heart attack Sister    • Hypothyroidism Brother    • Hypertension Brother    • Heart disease Brother    • Colon polyps Brother    • Heart attack Brother    • Hypothyroidism Daughter    • Scoliosis Daughter    • Depression Daughter    • Hypertension Maternal Grandmother    • Cancer Maternal Grandmother         2 rounds of breast   • No Known Problems Maternal Grandfather    • Diabetes Paternal Grandmother    • Stroke Paternal Grandmother    • Hypertension Paternal Grandmother    • Heart disease Paternal Grandfather    • Heart attack Paternal Grandfather    • Hypothyroidism Sister    • Hypertension Sister    • Colon polyps Sister    • Hypothyroidism Brother    • Hypertension Brother    • Heart disease Brother    • Colon polyps Brother    • Heart attack Brother    • Hypothyroidism Daughter    • Anxiety disorder Daughter    • Depression Daughter        SOCIAL HISTORY  Social History     Socioeconomic History   • Marital status:       Spouse name: Not on file   • Number of children: Not on file   • Years of education: Not on file   • Highest education level: Not on file   Tobacco Use   • Smoking status: Former Smoker   • Smokeless tobacco: Never Used   • Tobacco comment: quit in 2017   Substance and Sexual Activity   • Alcohol use: Yes     Comment: wine 3x month   • Drug use: Never   • Sexual activity: Yes     Birth control/protection: I.U.D.     Comment: Mirena       REVIEW OF SYSTEMS  Review of Systems   Constitutional: Negative for activity change, appetite change, chills, diaphoresis, fatigue, fever, unexpected weight gain and unexpected weight loss.   HENT: Negative for trouble swallowing and voice change.    Gastrointestinal: Positive for abdominal pain, diarrhea and nausea. Negative for abdominal distention, anal bleeding, blood in stool, constipation, rectal pain, vomiting, GERD and indigestion.         PHYSICAL EXAM   /66   Pulse 91   Wt (!) 156 kg (345 lb)   BMI 57.41 kg/m²   Physical Exam  Constitutional:       General: She is not in acute distress.     Appearance: She is well-developed.   HENT:      Head: Normocephalic and atraumatic.      Nose: Nose normal.   Eyes:      Conjunctiva/sclera: Conjunctivae normal.      Pupils: Pupils are equal, round, and reactive to light.   Cardiovascular:      Rate and Rhythm: Normal rate and regular rhythm.   Pulmonary:      Effort: Pulmonary effort is normal.      Breath sounds: Normal breath sounds.   Abdominal:      General: Bowel sounds are normal. There is no distension.      Tenderness: There is generalized abdominal tenderness.   Neurological:      Mental Status: She is alert and oriented to person, place, and time.   Psychiatric:         Behavior: Behavior normal.         Judgment: Judgment normal.           ASSESSMENT / PLAN  1.  Chronic diarrhea  -Colestid 2 g twice daily,  from other medications by at least 2 hours    2.  Family history of colon neoplasia  3.   Screen for colon cancer  -Colonoscopy for colorectal cancer screening    Return for Follow up after procedures.    I discussed the patients findings and my recommendations with patient    BONITA Boogie

## 2021-08-19 PROBLEM — Z12.11 SCREEN FOR COLON CANCER: Status: ACTIVE | Noted: 2021-08-19

## 2021-08-20 ENCOUNTER — TELEPHONE (OUTPATIENT)
Dept: CARDIOLOGY | Facility: CLINIC | Age: 51
End: 2021-08-20

## 2021-08-20 ENCOUNTER — PRE-ADMISSION TESTING (OUTPATIENT)
Dept: PREADMISSION TESTING | Facility: HOSPITAL | Age: 51
End: 2021-08-20

## 2021-08-20 VITALS — WEIGHT: 293 LBS | BODY MASS INDEX: 47.09 KG/M2 | HEIGHT: 66 IN

## 2021-08-20 LAB
DEPRECATED RDW RBC AUTO: 48.7 FL (ref 37–54)
ERYTHROCYTE [DISTWIDTH] IN BLOOD BY AUTOMATED COUNT: 15.1 % (ref 12.3–15.4)
HCT VFR BLD AUTO: 41.8 % (ref 34–46.6)
HGB BLD-MCNC: 13.3 G/DL (ref 12–15.9)
MCH RBC QN AUTO: 28.1 PG (ref 26.6–33)
MCHC RBC AUTO-ENTMCNC: 31.8 G/DL (ref 31.5–35.7)
MCV RBC AUTO: 88.4 FL (ref 79–97)
PLATELET # BLD AUTO: 326 10*3/MM3 (ref 140–450)
PMV BLD AUTO: 9.7 FL (ref 6–12)
POTASSIUM SERPL-SCNC: 4 MMOL/L (ref 3.5–5.2)
RBC # BLD AUTO: 4.73 10*6/MM3 (ref 3.77–5.28)
WBC # BLD AUTO: 8.34 10*3/MM3 (ref 3.4–10.8)

## 2021-08-20 PROCEDURE — 84132 ASSAY OF SERUM POTASSIUM: CPT

## 2021-08-20 PROCEDURE — 85027 COMPLETE CBC AUTOMATED: CPT

## 2021-08-20 PROCEDURE — 36415 COLL VENOUS BLD VENIPUNCTURE: CPT

## 2021-08-20 RX ORDER — SODIUM, POTASSIUM,MAG SULFATES 17.5-3.13G
2 SOLUTION, RECONSTITUTED, ORAL ORAL TAKE AS DIRECTED
Qty: 354 ML | Refills: 0 | Status: SHIPPED | OUTPATIENT
Start: 2021-08-20 | End: 2022-03-02

## 2021-08-20 NOTE — PAT
Per Anesthesia Request, patient instructed not to take their ACE/ARB medications on the AM of surgery.    EKG IN CHART FROM 5/10/2021    It was noted during Pre Admission Testing that patient was wearing some form of fingernail polish (gel/regular) and/or acrylic/artificial nails.  Patient was told that polish and/or artificial nails must be removed for surgery.  If a patient had recent manicure, and would rather not remove polish or artificial nails. Then the minimum requirement is that the polish/artificial nails must be removed from the middle finger on each hand.  Patient verbalized understanding.    If patient was having surgery on an upper extremity, then the patient was instructed that fingernail polish/artificial fingernails must be removed for surgery.  NO EXCEPTIONS.  Patient verbalized understanding.    If patient was having surgery on a lower extremity, then the patient was instructed that toenail polish on both extremities must be removed for surgery.  NO EXCEPTIONS. Patient verbalized understanding.    Patient instructed to bring CPAP mask and tubing to the hospital for overnight stay.  Explained that it is not necessary to bring their CPAP machine to the hospital instead a CPAP machine will be provided for use by the hospital. If patient knows their CPAP settings, those settings will be implemented.  If not, the CPAP machine will be utilized on the auto setting using their mask and tubing.    Patient verbalized understanding.    LVM FOR DR GUERRERO'S NURSE. PATIENT STATES SHE WAS CLEARED TO PROCEED WITH COLONOSCOPY AND INSTRUCTED TO STOP ELIQUIS AFTER MORNING DOSE Sunday. WAITING FOR CALL BACK. CHART ON HOLD IN [facility].     NO CONSENT ORDER IN [facility]. NOTE LEFT FOR PT TO SIGN AM OF PROCEDURE.

## 2021-08-20 NOTE — TELEPHONE ENCOUNTER
Josefina with PAT needing CC on this pt for upcoming colonoscopy with Dr. Rm at [facility]. The pt told GI that she was cleared and told she was allowed to hold her Eliquis 2 days prior. This was not in [facility] JUAN ANTONIO and no one has talked to her over the phone. Her echo we ordered for RAHMAN is not scheduled until October. Last echo at the time of prior MI in 2019 EF was 65%    Advised PAT I would ask RDS if he will clear her without the echo.

## 2021-08-25 ENCOUNTER — ANESTHESIA (OUTPATIENT)
Dept: GASTROENTEROLOGY | Facility: HOSPITAL | Age: 51
End: 2021-08-25

## 2021-08-25 ENCOUNTER — ANESTHESIA EVENT (OUTPATIENT)
Dept: GASTROENTEROLOGY | Facility: HOSPITAL | Age: 51
End: 2021-08-25

## 2021-08-25 ENCOUNTER — HOSPITAL ENCOUNTER (OUTPATIENT)
Facility: HOSPITAL | Age: 51
Setting detail: HOSPITAL OUTPATIENT SURGERY
Discharge: HOME | End: 2021-08-25
Attending: INTERNAL MEDICINE | Admitting: INTERNAL MEDICINE

## 2021-08-25 VITALS
OXYGEN SATURATION: 97 % | TEMPERATURE: 97.7 F | DIASTOLIC BLOOD PRESSURE: 94 MMHG | BODY MASS INDEX: 47.09 KG/M2 | RESPIRATION RATE: 22 BRPM | HEART RATE: 88 BPM | HEIGHT: 66 IN | WEIGHT: 293 LBS | SYSTOLIC BLOOD PRESSURE: 160 MMHG

## 2021-08-25 DIAGNOSIS — Z12.11 SCREEN FOR COLON CANCER: ICD-10-CM

## 2021-08-25 DIAGNOSIS — Z80.0 FAMILY HISTORY OF COLON CANCER: ICD-10-CM

## 2021-08-25 PROCEDURE — 25010000003 LIDOCAINE 1 % SOLUTION: Performed by: NURSE ANESTHETIST, CERTIFIED REGISTERED

## 2021-08-25 PROCEDURE — 88305 TISSUE EXAM BY PATHOLOGIST: CPT | Performed by: INTERNAL MEDICINE

## 2021-08-25 PROCEDURE — 45385 COLONOSCOPY W/LESION REMOVAL: CPT | Performed by: INTERNAL MEDICINE

## 2021-08-25 PROCEDURE — 25010000002 PROPOFOL 10 MG/ML EMULSION: Performed by: NURSE ANESTHETIST, CERTIFIED REGISTERED

## 2021-08-25 RX ORDER — LIDOCAINE HYDROCHLORIDE 10 MG/ML
INJECTION, SOLUTION INFILTRATION; PERINEURAL AS NEEDED
Status: DISCONTINUED | OUTPATIENT
Start: 2021-08-25 | End: 2021-08-25 | Stop reason: SURG

## 2021-08-25 RX ORDER — PROPOFOL 10 MG/ML
VIAL (ML) INTRAVENOUS CONTINUOUS PRN
Status: DISCONTINUED | OUTPATIENT
Start: 2021-08-25 | End: 2021-08-25 | Stop reason: SURG

## 2021-08-25 RX ORDER — SODIUM CHLORIDE, SODIUM LACTATE, POTASSIUM CHLORIDE, AND CALCIUM CHLORIDE .6; .31; .03; .02 G/100ML; G/100ML; G/100ML; G/100ML
20 INJECTION, SOLUTION INTRAVENOUS CONTINUOUS
Status: DISCONTINUED | OUTPATIENT
Start: 2021-08-25 | End: 2021-08-25 | Stop reason: HOSPADM

## 2021-08-25 RX ORDER — PROPOFOL 10 MG/ML
VIAL (ML) INTRAVENOUS AS NEEDED
Status: DISCONTINUED | OUTPATIENT
Start: 2021-08-25 | End: 2021-08-25 | Stop reason: SURG

## 2021-08-25 RX ADMIN — PROPOFOL 100 MG: 10 INJECTION, EMULSION INTRAVENOUS at 13:40

## 2021-08-25 RX ADMIN — Medication 100 MCG/KG/MIN: at 13:40

## 2021-08-25 RX ADMIN — SODIUM CHLORIDE, POTASSIUM CHLORIDE, SODIUM LACTATE AND CALCIUM CHLORIDE 1000 ML: 600; 310; 30; 20 INJECTION, SOLUTION INTRAVENOUS at 13:56

## 2021-08-25 RX ADMIN — SODIUM CHLORIDE, POTASSIUM CHLORIDE, SODIUM LACTATE AND CALCIUM CHLORIDE 20 ML/HR: 600; 310; 30; 20 INJECTION, SOLUTION INTRAVENOUS at 12:59

## 2021-08-25 RX ADMIN — LIDOCAINE HYDROCHLORIDE 50 MG: 10 INJECTION, SOLUTION INFILTRATION; PERINEURAL at 13:40

## 2021-08-25 NOTE — H&P
Gastroenterology Consult Note    Reason for Consultation: Screening    Patient Care Team:  Isabel Hidalgo APRN as PCP - General (Family Medicine)  Brook Green MD as Consulting Physician (Obstetrics and Gynecology)  Pratik White MD as Consulting Physician (Cardiology)    Chief complaint: Screening      History of present illness: The patient is a 51-year-old female who has a sister with colon polyps.  Her mother had colon polyps.  Her other sister had colorectal cancer and had a colostomy.  She is otherwise doing well.  The patient has chronic diarrhea but that is been a lifelong problem.  She has no rectal bleeding.  This is her initial colonoscopy.  She is off her anticoagulants.  She does have a history of a cholecystectomy for stones.  She has had a dislocated knee and surgery in the past.  She has had a tonsillectomy, breast reduction, and cardiac catheterizations.  She apparently has had several myocardial infarctions.  She has had a DVT as well.  She has a history of hypothyroidism and sleep apnea.    Allergies   Allergen Reactions   • Adhesive Tape Other (See Comments)     blisters   • Darvon [Propoxyphene] GI Intolerance   • Lisinopril Nausea Only     Prior to Admission medications    Medication Sig Start Date End Date Taking? Authorizing Provider   aspirin 81 MG EC tablet Take 81 mg by mouth Daily.   Yes Provider, MD Anika   atorvastatin (LIPITOR) 40 MG tablet Take 1 tablet by mouth Every Night. 2/9/21  Yes Isabel Hidalgo APRN   Eliquis 5 MG tablet tablet Take 1 tablet by mouth Every 12 (Twelve) Hours.  Patient taking differently: Take 5 mg by mouth Every 12 (Twelve) Hours. Instructed to stop after Sunday (8/22/2021) AM dose. 4/19/21  Yes Gordon Wilson MD   levothyroxine (SYNTHROID, LEVOTHROID) 200 MCG tablet Take 1 tablet by mouth Daily.  Patient taking differently: Take 200 mcg by mouth Daily. Takes with 25mcg tab 2/9/21  Yes Isabel Hidalgo APRN   levothyroxine (SYNTHROID,  LEVOTHROID) 25 MCG tablet Take 1 tablet by mouth Every Morning. Must make appointment for further refills.  Patient taking differently: Take 25 mcg by mouth Every Morning. Must make appointment for further refills. Take with 200mcg tab 8/16/21  Yes Isabel Hidalgo APRN   MAGNESIUM PO Take  by mouth.   Yes Anika Pichardo MD   metFORMIN (GLUCOPHAGE) 500 MG tablet Take 1 tablet by mouth Daily With Breakfast. 2/9/21  Yes Isabel Hidalgo APRN   metoprolol tartrate (LOPRESSOR) 25 MG tablet Take 1 tablet by mouth 2 (Two) Times a Day. 5/27/21  Yes Pratik White MD   multivitamin with minerals (MULTIVITAMIN ADULT PO) Take 1 tablet by mouth Daily. Vitafusion   Yes Anika Pichardo MD   NIFEdipine CC (ADALAT CC) 60 MG 24 hr tablet Take 1 tablet by mouth Daily. 2/9/21  Yes Isabel Hidalgo APRN   ondansetron (Zofran) 4 MG tablet Take 1 tablet by mouth Every 8 (Eight) Hours As Needed for Nausea or Vomiting. 4/19/21  Yes Shahrzad Huston APRN   POTASSIUM PO Take 550 mg by mouth Daily. Plus magnesium   Yes ProviderAnika MD   promethazine (PHENERGAN) 25 MG tablet Take 1 tablet by mouth Every 6 (Six) Hours As Needed for Nausea or Vomiting. 4/19/21  Yes Gordon Wilson MD   sodium-potassium-magnesium sulfates (Suprep Bowel Prep Kit) 17.5-3.13-1.6 GM/177ML solution oral solution Take 2 bottles by mouth Take As Directed. Do not eat the day before your procedure. If you didn't receive instructions call [phone]. 8/20/21  Yes Lamont Rm MD   valsartan-hydrochlorothiazide (DIOVAN-HCT) 80-12.5 MG per tablet Take 1 tablet by mouth Daily. 2/9/21  Yes Isabel Hidalgo APRN   colestipol (COLESTID) 1 g tablet Take 2 tablets by mouth 2 (Two) Times a Day. Titrate to effectiveness. Do not take within 2 hours of other medications 8/18/21   Mikey Mejia APRN   ibuprofen (ADVIL,MOTRIN) 800 MG tablet Take 800 mg by mouth Every 6 (Six) Hours As Needed for Mild Pain . Will stop for  upcoming procedure.    Provider, MD Anika   nitroglycerin (NITROLINGUAL) 0.4 MG/SPRAY spray Place 1 spray under the tongue Every 5 (Five) Minutes As Needed for Chest Pain.    Provider, MD Anika   benzonatate (TESSALON) 200 MG capsule Take 1 capsule by mouth 3 (Three) Times a Day As Needed for Cough.  Patient taking differently: Take 200 mg by mouth 3 (Three) Times a Day As Needed for Cough. Not taking currently. Just as needed 8/11/21 8/25/21  Tavia Rueda APRN      Current Facility-Administered Medications   Medication Dose Route Frequency Provider Last Rate Last Admin   • lactated ringers solution  20 mL/hr Intravenous Continuous Saurabh Leo MD 20 mL/hr at 08/25/21 1259 20 mL/hr at 08/25/21 1259      Past Medical History:   Diagnosis Date   • Abdominal pain    • Anemia    • Anxiety    • Arthritis    • Back pain    • Bronchitis    • Cardiovascular disease    • Cataract    • Coronary artery disease    • Depression    • Diarrhea     chronic- since kid   • Diverticulitis    • DVT (deep venous thrombosis) (CMS/HCC)     left lower calf   • Eczema     stress induced   • Gall stones    • Hashimoto's disease    • Heart attack (CMS/HCC)     x5   • History of transfusion     no reaction to blood; age 16   • Hyperlipidemia    • Hypertension    • Hypothyroidism    • Kidney stone    • Lyme disease    • Migraine headache    • OCD (obsessive compulsive disorder)    • Panic attack    • PCOS (polycystic ovarian syndrome)     takes metformin   • PNA (pneumonia)    • Seasonal allergies    • Sleep apnea     cpap compliant   • Wears glasses      Past Surgical History:   Procedure Laterality Date   • BILATERAL BREAST REDUCTION     • CARDIAC CATHETERIZATION  10/2019   • CHOLECYSTECTOMY     • KNEE SURGERY Right     scopy   • TONSILLECTOMY       Family History   Problem Relation Age of Onset   • Cancer Mother         multiple myoloma    • Hypertension Mother    • Thyroid disease Mother    • Hypothyroidism Mother    •  Colon polyps Mother    • Heart disease Father    • Hypertension Father    • Deep vein thrombosis Father    • Hypothyroidism Father    • Heart attack Father    • Hypothyroidism Sister    • Hypertension Sister    • Heart disease Sister    • Cancer Sister         colorectal   • Colon polyps Sister    • Heart attack Sister    • Hypothyroidism Brother    • Hypertension Brother    • Heart disease Brother    • Colon polyps Brother    • Heart attack Brother    • Hypothyroidism Daughter    • Scoliosis Daughter    • Depression Daughter    • Hypertension Maternal Grandmother    • Cancer Maternal Grandmother         2 rounds of breast   • No Known Problems Maternal Grandfather    • Diabetes Paternal Grandmother    • Stroke Paternal Grandmother    • Hypertension Paternal Grandmother    • Heart disease Paternal Grandfather    • Heart attack Paternal Grandfather    • Hypothyroidism Sister    • Hypertension Sister    • Colon polyps Sister    • Hypothyroidism Brother    • Hypertension Brother    • Heart disease Brother    • Colon polyps Brother    • Heart attack Brother    • Hypothyroidism Daughter    • Anxiety disorder Daughter    • Depression Daughter      GI Family History  Colon Polyps and family history of colon cancer  Social History     Tobacco Use   Smoking Status Former Smoker   • Packs/day: 0.75   • Years: 20.00   • Pack years: 15.00   • Types: Cigarettes   • Quit date:    • Years since quittin.6   Smokeless Tobacco Never Used     Social History     Substance and Sexual Activity   Alcohol Use Yes    Comment: wine 3x month      Social History     Substance and Sexual Activity   Drug Use Never        ------  Review of Systems    Vital Signs   Temp:  [98 °F (36.7 °C)] 98 °F (36.7 °C)  Heart Rate:  [93] 93  Resp:  [16] 16  BP: (153)/(89) 153/89    Physical Exam:   General Appearance: Alert, in no acute distress  Head: Normocephalic, without obvious abnormality, atraumatic  Eyes: Lids and lashes normal, conjunctivae and  sclerae normal, no icterus, no pallor, corneas clear, PERRLA  Ears: Ears appear intact with no abnormalities noted  Neck: No adenopathy, supple, trachea midline, no thyromegaly, no JVD  Lungs: respirations regular, even and unlabored Heart: Regular rhythm and normal rate  Chest Wall: Symmetrical respiratory expansion  Abdomen: No masses, no organomegaly, soft nontender, nondistended, no guarding, no rebound tenderness  Extremities:  Moves all extremities well, no edema, no cyanosis, no redness  Skin: No bleeding, bruising or rash  Neurologic: Cranial nerves 2 - 12 grossly intact, no focal deficits       LABS:   Lab Results (last 48 hours)     ** No results found for the last 48 hours. **        RADIOLOGY:  Imaging Results (Last 24 Hours)     ** No results found for the last 24 hours. **          Assessment/Plan       Family history of colon cancer    Screen for colon cancer      Impressions and plan #1 screening: The patient needs a colonoscopy based on screening alone.  She has a mother and a sister with polyps.  She has another sister with colon cancer.  This is her initial evaluation.  I may do random biopsies given her lifelong history of diarrhea.  She has been off her anticoagulants.        I discussed the patient's findings and my recommendations with patient    Lamont Rm MD  08/25/21  13:36 EDT

## 2021-08-25 NOTE — ANESTHESIA PREPROCEDURE EVALUATION
Anesthesia Evaluation     Patient summary reviewed and Nursing notes reviewed   NPO Solid Status: > 8 hours  NPO Liquid Status: > 8 hours           Airway   Mallampati: II  TM distance: >3 FB  Neck ROM: full  No difficulty expected  Dental      Pulmonary    (+) a smoker (2017) Former, sleep apnea on CPAP,   (-) pneumonia (bacterial PNA and COVID Jan 2021), COPD, asthma, shortness of breath, recent URI    ROS comment: CXR Clear   Cardiovascular     ECG reviewed    (+) hypertension, past MI (eNSTEMI ? enzymes only see cath 2019 data deficit ) , CAD (mild 40% 1 vessel 2019 ), DVT (1998 remote ) resolved, hyperlipidemia,   (-) dysrhythmias, angina (see cath ), cardiac stents    ROS comment: Normal sinus rhythm  Possible Anterior infarct    Neuro/Psych  (+) headaches, psychiatric history,     (-) seizures, CVA  GI/Hepatic/Renal/Endo    (+) morbid obesity,  thyroid problem (hashimotos ) hypothyroidism  (-)  obesity, liver disease, no renal disease (creat normal ), diabetes    Musculoskeletal     (+) back pain,   Abdominal    Substance History      OB/GYN          Other   arthritis, blood dyscrasia,     ROS/Med Hx Other: eliquis for DVT   unk clotting dyscrasia (familial)                 Anesthesia Plan    ASA 3     general and MAC   (PFL -HI FiO2 )  intravenous induction     Anesthetic plan, all risks, benefits, and alternatives have been provided, discussed and informed consent has been obtained with: patient.    Plan discussed with CRNA.

## 2021-08-25 NOTE — ANESTHESIA POSTPROCEDURE EVALUATION
Patient: Jazmín Hicks    Procedure Summary     Date: 08/25/21 Room / Location:  [facility] /  [facility]    Anesthesia Start: 1337 Anesthesia Stop: 1408    Procedure: Colonoscopy with possible polypectomy, biopsy and control of GI bleeding (N/A ) Diagnosis:       Screen for colon cancer      Family history of colon cancer      (Screen for colon cancer [Z12.11])      (Family history of colon cancer [Z80.0])    Surgeons: Lamont Rm MD Provider: Saurabh Leo MD    Anesthesia Type: general, MAC ASA Status: 3          Anesthesia Type: general, MAC    Vitals  No vitals data found for the desired time range.          Post Anesthesia Care and Evaluation    Patient location during evaluation: PACU  Patient participation: complete - patient participated  Level of consciousness: awake and alert  Pain score: 0  Pain management: adequate  Airway patency: patent  Anesthetic complications: No anesthetic complications  PONV Status: none  Cardiovascular status: hemodynamically stable and acceptable  Respiratory status: nonlabored ventilation, acceptable and nasal cannula  Hydration status: acceptable

## 2021-08-25 NOTE — OP NOTE
Colonoscopy with polypectomy      Instrument: Adult video colonoscope      Medications: Propofol as per anesthesia      Preop diagnosis: Family history of colon cancer and polyps, and the need for screening      Postop diagnosis: Colon polyps, diverticulosis        Indications: The patient is a 51-year-old here primarily for screening evaluation.  She has no blood in the stool or abdominal pain.  She has a mother with colon polyps, sister with colon polyps, and a sister with colon cancer.  This is her initial evaluation.        Procedure: After the patient was informed of the risks, benefits, alternatives to the procedure, informed consent was obtained.  Endoscope was inserted into the rectum advanced to the cecum.  Cecum was notified by the appendiceal orifice and ileocecal valve.  There was some coating in the cecum and right colon but no polyps were seen.  The rest of the transverse colon was remarkable until 65 cm where there was a sessile 5 to 6 mm adenomatous polyp cold snared removed.  Tissue was retrieved.  Further withdrawal revealed diverticulosis of the left colon and particularly the sigmoid.  There was one small sessile polyp on a fold snared removed about 4 mm in size at 30 cm.  Retroflexion was performed.  The scope was reintroduced back up to the ileocecal valve.  There was a 14-minute withdrawal time.  The patient tolerated the procedure well and there were no immediate complications.  There was some liquid and small bits of solid stool in areas.      Impressions and plan #1 transverse colon polyp removed      #2 sigmoid colon polyp removed      #3 diverticulosis      #4 some stool in areas        Plan: Repeat colonoscopy in 3 to 5 years given the prep, family history and polyps.        Cc referring Isabel Hidalgo

## 2021-08-25 NOTE — PERIOPERATIVE NURSING NOTE
Dr Rm notified per phone to ask when to resume her Eliquis. He said to wait 24 hrs and if no bleeding noted, restart tomorrow.  Pt instructed of this

## 2021-08-26 LAB
CYTO UR: NORMAL
LAB AP CASE REPORT: NORMAL
LAB AP CLINICAL INFORMATION: NORMAL
PATH REPORT.FINAL DX SPEC: NORMAL
PATH REPORT.GROSS SPEC: NORMAL

## 2021-09-09 ENCOUNTER — APPOINTMENT (OUTPATIENT)
Dept: MAMMOGRAPHY | Facility: HOSPITAL | Age: 51
End: 2021-09-09

## 2021-09-23 DIAGNOSIS — E06.3 HYPOTHYROIDISM DUE TO HASHIMOTO'S THYROIDITIS: ICD-10-CM

## 2021-09-23 DIAGNOSIS — E03.8 HYPOTHYROIDISM DUE TO HASHIMOTO'S THYROIDITIS: ICD-10-CM

## 2021-09-23 RX ORDER — LEVOTHYROXINE SODIUM 0.03 MG/1
TABLET ORAL
Qty: 30 TABLET | Refills: 0 | OUTPATIENT
Start: 2021-09-23

## 2021-09-25 DIAGNOSIS — E03.8 HYPOTHYROIDISM DUE TO HASHIMOTO'S THYROIDITIS: ICD-10-CM

## 2021-09-25 DIAGNOSIS — E06.3 HYPOTHYROIDISM DUE TO HASHIMOTO'S THYROIDITIS: ICD-10-CM

## 2021-09-28 RX ORDER — LEVOTHYROXINE SODIUM 0.03 MG/1
TABLET ORAL
Qty: 30 TABLET | Refills: 0 | OUTPATIENT
Start: 2021-09-28

## 2021-10-01 DIAGNOSIS — I25.10 CARDIOVASCULAR DISEASE: Primary | ICD-10-CM

## 2021-10-01 DIAGNOSIS — E03.8 HYPOTHYROIDISM DUE TO HASHIMOTO'S THYROIDITIS: Primary | ICD-10-CM

## 2021-10-01 DIAGNOSIS — R73.09 ELEVATED HEMOGLOBIN A1C: ICD-10-CM

## 2021-10-01 DIAGNOSIS — E06.3 HYPOTHYROIDISM DUE TO HASHIMOTO'S THYROIDITIS: Primary | ICD-10-CM

## 2021-10-01 DIAGNOSIS — I25.10 CARDIOVASCULAR DISEASE: ICD-10-CM

## 2021-10-01 RX ORDER — LEVOTHYROXINE SODIUM 0.2 MG/1
200 TABLET ORAL DAILY
Qty: 30 TABLET | Refills: 0 | Status: SHIPPED | OUTPATIENT
Start: 2021-10-01 | End: 2021-11-17

## 2021-10-01 RX ORDER — LEVOTHYROXINE SODIUM 0.03 MG/1
25 TABLET ORAL
Qty: 30 TABLET | Refills: 0 | Status: SHIPPED | OUTPATIENT
Start: 2021-10-01 | End: 2021-11-17

## 2021-10-02 ENCOUNTER — TELEPHONE (OUTPATIENT)
Dept: FAMILY MEDICINE CLINIC | Facility: CLINIC | Age: 51
End: 2021-10-02

## 2021-10-02 NOTE — TELEPHONE ENCOUNTER
----- Message from Jazmín Hicks sent at 10/1/2021 12:40 PM EDT -----  Regarding: RE: Prescription Question  Contact: [phone]  The fax number is [phone] will that fasting?    ----- Message -----  From: BONITA Castellano  Sent: 10/1/21 12:31 PM  To: Jazmín Hicks  Subject: RE: Prescription Question    Yes.Please provide me with the fax number and I will be happy to do so.      ----- Message -----       From:Jazmín Hicks       Sent:10/1/2021 12:26 PM EDT         To:BONITA Castellano    Subject:RE: Prescription Question      Can you fax to the [facility] location.       ----- Message -----       From:BONITA Castellano       Sent:10/1/2021  8:11 AM EDT         To:Jazmín Hicks    Subject:RE: Prescription Question    I have ordered your labs and sent in your refills. How do you want to get your lab order?      ----- Message -----       From:Jazmín Hicks       Sent:9/30/2021  8:03 PM EDT         To:BONITA Castellano    Subject:RE: Prescription Question    Yes, that would be great.. I am currently covering at location in [place]. Thank you so very much      ----- Message -----       From:BONITA Castellano       Sent:9/30/2021  7:36 PM EDT         To:Jazmín Hicks    Subject:RE: Prescription Question    If you would like to have your labs drawn at [place] I can write an order for you and you can have them done at your facility if this is easier for you. Please let me know your preference regarding this.      ----- Message -----       From:Jazmín Hicks       Sent:9/29/2021  8:00 AM EDT         To:BONITA Castellano    Subject:Prescription Question    For the levothyroxine 25 mmg because I also take 200mmg, you may want to create a blood work order so when I come in you have the necessary information to make a refill of medicine. Let me know so that I can make two very early appointments so I don't have to miss  work. I just started at [employer] so until January I can't miss work.

## 2021-10-05 ENCOUNTER — APPOINTMENT (OUTPATIENT)
Dept: CARDIOLOGY | Facility: HOSPITAL | Age: 51
End: 2021-10-05

## 2021-10-13 DIAGNOSIS — I25.10 CARDIOVASCULAR DISEASE: ICD-10-CM

## 2021-10-13 DIAGNOSIS — I10 ESSENTIAL HYPERTENSION: ICD-10-CM

## 2021-10-13 RX ORDER — NIFEDIPINE 60 MG/1
TABLET, FILM COATED, EXTENDED RELEASE ORAL
Qty: 30 TABLET | Refills: 5 | Status: SHIPPED | OUTPATIENT
Start: 2021-10-13 | End: 2021-10-26 | Stop reason: SDUPTHER

## 2021-10-13 RX ORDER — VALSARTAN AND HYDROCHLOROTHIAZIDE 80; 12.5 MG/1; MG/1
TABLET, FILM COATED ORAL
Qty: 30 TABLET | Refills: 5 | Status: SHIPPED | OUTPATIENT
Start: 2021-10-13 | End: 2021-10-26 | Stop reason: SDUPTHER

## 2021-10-13 RX ORDER — ATORVASTATIN CALCIUM 40 MG/1
TABLET, FILM COATED ORAL
Qty: 30 TABLET | Refills: 5 | Status: SHIPPED | OUTPATIENT
Start: 2021-10-13 | End: 2022-07-28 | Stop reason: SDUPTHER

## 2021-10-26 DIAGNOSIS — I10 ESSENTIAL HYPERTENSION: ICD-10-CM

## 2021-10-26 RX ORDER — NIFEDIPINE 60 MG/1
60 TABLET, FILM COATED, EXTENDED RELEASE ORAL DAILY
Qty: 30 TABLET | Refills: 5 | Status: SHIPPED | OUTPATIENT
Start: 2021-10-26 | End: 2022-07-28 | Stop reason: SDUPTHER

## 2021-10-26 RX ORDER — VALSARTAN AND HYDROCHLOROTHIAZIDE 80; 12.5 MG/1; MG/1
1 TABLET, FILM COATED ORAL DAILY
Qty: 30 TABLET | Refills: 5 | Status: SHIPPED | OUTPATIENT
Start: 2021-10-26 | End: 2022-07-28 | Stop reason: SDUPTHER

## 2021-10-28 ENCOUNTER — APPOINTMENT (OUTPATIENT)
Dept: CARDIOLOGY | Facility: HOSPITAL | Age: 51
End: 2021-10-28

## 2021-11-16 DIAGNOSIS — E28.2 PCOS (POLYCYSTIC OVARIAN SYNDROME): ICD-10-CM

## 2021-11-16 DIAGNOSIS — E06.3 HYPOTHYROIDISM DUE TO HASHIMOTO'S THYROIDITIS: ICD-10-CM

## 2021-11-16 DIAGNOSIS — E03.8 HYPOTHYROIDISM DUE TO HASHIMOTO'S THYROIDITIS: ICD-10-CM

## 2021-11-17 RX ORDER — LEVOTHYROXINE SODIUM 0.03 MG/1
25 TABLET ORAL DAILY
Qty: 90 TABLET | Refills: 0 | Status: SHIPPED | OUTPATIENT
Start: 2021-11-17 | End: 2022-04-16

## 2021-11-17 RX ORDER — LEVOTHYROXINE SODIUM 0.2 MG/1
TABLET ORAL
Qty: 90 TABLET | Refills: 0 | Status: SHIPPED | OUTPATIENT
Start: 2021-11-17 | End: 2022-04-16

## 2022-01-06 ENCOUNTER — TELEMEDICINE (OUTPATIENT)
Dept: FAMILY MEDICINE CLINIC | Facility: TELEHEALTH | Age: 52
End: 2022-01-06

## 2022-01-06 ENCOUNTER — E-VISIT (OUTPATIENT)
Dept: ADMINISTRATIVE | Facility: OTHER | Age: 52
End: 2022-01-06

## 2022-01-06 VITALS — WEIGHT: 293 LBS | TEMPERATURE: 102.1 F | HEIGHT: 66 IN | BODY MASS INDEX: 47.09 KG/M2

## 2022-01-06 DIAGNOSIS — R50.9 FEVER, UNSPECIFIED FEVER CAUSE: ICD-10-CM

## 2022-01-06 DIAGNOSIS — Z11.52 ENCOUNTER FOR SCREENING FOR COVID-19: ICD-10-CM

## 2022-01-06 DIAGNOSIS — J40 BRONCHITIS: Primary | ICD-10-CM

## 2022-01-06 PROCEDURE — U0003 INFECTIOUS AGENT DETECTION BY NUCLEIC ACID (DNA OR RNA); SEVERE ACUTE RESPIRATORY SYNDROME CORONAVIRUS 2 (SARS-COV-2) (CORONAVIRUS DISEASE [COVID-19]), AMPLIFIED PROBE TECHNIQUE, MAKING USE OF HIGH THROUGHPUT TECHNOLOGIES AS DESCRIBED BY CMS-2020-01-R: HCPCS | Performed by: NURSE PRACTITIONER

## 2022-01-06 PROCEDURE — 99213 OFFICE O/P EST LOW 20 MIN: CPT | Performed by: NURSE PRACTITIONER

## 2022-01-06 RX ORDER — AZITHROMYCIN 250 MG/1
TABLET, FILM COATED ORAL
Qty: 6 TABLET | Refills: 0 | OUTPATIENT
Start: 2022-01-06 | End: 2022-03-02

## 2022-01-06 RX ORDER — DEXTROMETHORPHAN HYDROBROMIDE AND PROMETHAZINE HYDROCHLORIDE 15; 6.25 MG/5ML; MG/5ML
5 SYRUP ORAL NIGHTLY PRN
Qty: 118 ML | Refills: 0 | OUTPATIENT
Start: 2022-01-06 | End: 2022-03-02

## 2022-01-06 RX ORDER — GUAIFENESIN 600 MG/1
600 TABLET, EXTENDED RELEASE ORAL 2 TIMES DAILY
Qty: 28 TABLET | Refills: 0 | Status: SHIPPED | OUTPATIENT
Start: 2022-01-06 | End: 2022-01-20

## 2022-01-06 RX ORDER — PREDNISONE 20 MG/1
TABLET ORAL
Qty: 13 TABLET | Refills: 0 | OUTPATIENT
Start: 2022-01-06 | End: 2022-03-02

## 2022-01-06 NOTE — PROGRESS NOTES
You have chosen to receive care through a telehealth visit.  Do you consent to use a video/audio connection for your medical care today? Yes     CHIEF COMPLAINT  Cc: cough, congestion, fever    HPI  Jazmín Hicks is a 51 y.o. female  presents with complaint of cough, congestion and fever. She reports that her temperature was 102.1. Additional symptoms include; chills, fatigue, clear to white nasal congestion, sinus pain and pressure, sore throat, chest tightness, shortness of breath, nighttime wheezing, nausea, muscle aches and headaches. 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 She has no known COVID or flu exposure. She is vaccinated for COVID-19 via 3 doses of the Moderna vaccine. She has taken tylenol cold and sinus for her symptoms. Her symptoms started 12/30/2021. She does report a history of COVID 12/2020.    Review of Systems   Constitutional: Positive for chills, fatigue and fever.   HENT: Positive for congestion (clear to white), sinus pressure, sinus pain and sore throat. Negative for rhinorrhea and tinnitus. Sneezing: resolved.         No loss of taste, cannot smell related to nasal congestion   Respiratory: Positive for cough, chest tightness, shortness of breath and wheezing (at night).    Cardiovascular: Negative for chest pain.   Gastrointestinal: Positive for nausea. Negative for diarrhea and vomiting.   Musculoskeletal: Positive for myalgias.   Neurological: Positive for headaches.       Past Medical History:   Diagnosis Date   • Abdominal pain    • Anemia    • Anxiety    • Arthritis    • Back pain    • Bronchitis    • Cardiovascular disease    • Cataract    • Coronary artery disease    • Depression    • Diarrhea     chronic- since kid   • Diverticulitis    • DVT (deep venous thrombosis) (HCC)     left lower calf   • Eczema     stress induced   • Gall stones    • Hashimoto's disease    • Heart attack (HCC)     x5   •  History of transfusion     no reaction to blood; age 16   • Hyperlipidemia    • Hypertension    • Hypothyroidism    • Kidney stone    • Lyme disease    • Migraine headache    • OCD (obsessive compulsive disorder)    • Panic attack    • PCOS (polycystic ovarian syndrome)     takes metformin   • PNA (pneumonia)    • Seasonal allergies    • Sleep apnea     cpap compliant   • Wears glasses        Family History   Problem Relation Age of Onset   • Cancer Mother         multiple myoloma    • Hypertension Mother    • Thyroid disease Mother    • Hypothyroidism Mother    • Colon polyps Mother    • Heart disease Father    • Hypertension Father    • Deep vein thrombosis Father    • Hypothyroidism Father    • Heart attack Father    • Hypothyroidism Sister    • Hypertension Sister    • Heart disease Sister    • Cancer Sister         colorectal   • Colon polyps Sister    • Heart attack Sister    • Hypothyroidism Brother    • Hypertension Brother    • Heart disease Brother    • Colon polyps Brother    • Heart attack Brother    • Hypothyroidism Daughter    • Scoliosis Daughter    • Depression Daughter    • Hypertension Maternal Grandmother    • Cancer Maternal Grandmother         2 rounds of breast   • No Known Problems Maternal Grandfather    • Diabetes Paternal Grandmother    • Stroke Paternal Grandmother    • Hypertension Paternal Grandmother    • Heart disease Paternal Grandfather    • Heart attack Paternal Grandfather    • Hypothyroidism Sister    • Hypertension Sister    • Colon polyps Sister    • Hypothyroidism Brother    • Hypertension Brother    • Heart disease Brother    • Colon polyps Brother    • Heart attack Brother    • Hypothyroidism Daughter    • Anxiety disorder Daughter    • Depression Daughter        Social History     Socioeconomic History   • Marital status:    Tobacco Use   • Smoking status: Former Smoker     Packs/day: 0.75     Years: 20.00     Pack years: 15.00     Types: Cigarettes     Quit date:  "2017     Years since quittin.0   • Smokeless tobacco: Never Used   Vaping Use   • Vaping Use: Never used   Substance and Sexual Activity   • Alcohol use: Yes     Comment: wine 3x month   • Drug use: Never   • Sexual activity: Yes     Birth control/protection: I.U.D.     Comment: Mirena         Temp (!) 102.1 °F (38.9 °C)   Ht 166.4 cm (65.5\")   Wt (!) 157 kg (346 lb)   Breastfeeding No   BMI 56.70 kg/m²     PHYSICAL EXAM  Physical Exam   Constitutional: She is oriented to person, place, and time. She appears well-developed and well-nourished.   HENT:   Head: Normocephalic and atraumatic.   Right Ear: External ear normal.   Left Ear: External ear normal.   Nose: Congestion present. Right sinus exhibits maxillary sinus tenderness (patient directed exam) and frontal sinus tenderness (patient directed exam). Left sinus exhibits maxillary sinus tenderness (patient directed exam) and frontal sinus tenderness (patient directed exam).   Patient reported sore throat   Eyes: Lids are normal. Right eye exhibits no discharge and no exudate. Left eye exhibits no discharge and no exudate. Right conjunctiva is not injected. Left conjunctiva is not injected.   Pulmonary/Chest: No accessory muscle usage. No tachypnea and no bradypnea.  No respiratory distress (intermittent cough at visit).No use of oxygen by nasal cannulaNo use of oxygen by mask noted.  Abdominal: Abdomen appears normal.   Neurological: She is alert and oriented to person, place, and time. No cranial nerve deficit.   Skin: Her skin appears normal.  Psychiatric: She has a normal mood and affect. Her speech is normal and behavior is normal. Judgment and thought content normal.       Results for orders placed or performed during the hospital encounter of 21   Tissue Pathology Exam    Specimen: A: Large Intestine, Transverse Colon; Tissue    B: Large Intestine, Sigmoid Colon; Tissue   Result Value Ref Range    Case Report       Surgical Pathology Report    "                      Case: [id number]                                  Authorizing Provider:  Lamont Rm MD   Collected:           08/25/2021 01:54 PM          Ordering Location:     [facility]   Received:            08/25/2021 02:34 PM                                 ENDO SUITES                                                                  Pathologist:           Josh Jackson MD                                                           Specimens:   1) - Large Intestine, Transverse Colon, polyp at 65 cm                                              2) - Large Intestine, Sigmoid Colon, polyp at 30 cm                                        Clinical Information       Screening for colon cancer, family history of colon cancer      Final Diagnosis       1. TRANSVERSE COLON POLYP AT 65 CM:                 Tubular adenoma without high-grade dysplasia    2.  SIGMOID COLON POLYP:                 Hyperplastic polyp      Gross Description       Specimen 1 received in formalin labeled as transverse colon polyp at 65 cm is a 0.5 x 0.2 x 0.2 cm single tan tissue fragment admixed with fecal material, submitted entirely in block 1A.    Specimen 2 received in formalin labeled as sigmoid colon polyp at 30 cm is a 0.2 x 0.1 x 0.1 cm single white tissue fragment, submitted entirely in block 2A.  LDP      Microscopic Description       The slides are reviewed and demonstrate histopathologic features supporting the above rendered diagnosis.           Diagnoses and all orders for this visit:    1. Bronchitis (Primary)  -     COVID-19,[facility] ROUTINE, NP/OP SWAB IN TRANSPORT MEDIA OR ESWAB 72 HR TAT - Swab, Nasopharynx; Future    2. Encounter for screening for COVID-19  -     COVID-19,[facility] ROUTINE, NP/OP SWAB IN TRANSPORT MEDIA OR ESWAB 72 HR TAT - Swab, Nasopharynx; Future    3. Fever, unspecified fever cause    Other orders  -     azithromycin (Zithromax) 250 MG tablet; Take 2 tablets the first day, then 1  tablet daily for 4 days.  Dispense: 6 tablet; Refill: 0  -     predniSONE (DELTASONE) 20 MG tablet; Prednisone 20mg tabs, 3 for 2 days, 2 for 2 days, 1 for 2 days, 1/2 for 2 days take with food or milk  Dispense: 13 tablet; Refill: 0  -     guaiFENesin (Mucinex) 600 MG 12 hr tablet; Take 1 tablet by mouth 2 (Two) Times a Day for 14 days.  Dispense: 28 tablet; Refill: 0  -     promethazine-dextromethorphan (PROMETHAZINE-DM) 6.25-15 MG/5ML syrup; Take 5 mL by mouth At Night As Needed for Cough.  Dispense: 118 mL; Refill: 0    Isolate for 10 days from onset of symptoms and 24 hours fever/symptoms free without fever reducers  As you are fully vaccinated and if your COVID test comes back negative and when you are symptom and fever free for 24 hours you may discontinue isolation  COVID-19 test and results pending. We will call results to you and they can also be found in MY Chart  Differential diagnosis: flu, tamiflu not ordered for this patient related to nausea  Probiotics for two weeks related to taking antibiotics. The pharmacist can help you with this if needed.  Take prednisone with food as early in the day as possible  Do not take prednisone with nsaids such as ibuprofen, aleve, or aspirin  May take tylenol for pain or fever  Mucinex with plenty of fluids especially water to thin secretions and help with congestion.  Do not drive after taking promethazine DM as it may make you drowsy  Albuerol inhaler as needed and directed for shortness of breath    FOLLOW-UP  If symptoms worsen or persist follow up with PCP/[facility] or Urgent Care    Patient verbalizes understanding of medication dosage, comfort measures, instructions for treatment and follow-up.    Norma Miguel, BONITA  01/06/2022  11:58 EST    This visit was performed via Telehealth.  This patient has been instructed to follow-up with their primary care provider if their symptoms worsen or the treatment provided does not resolve their illness.

## 2022-01-06 NOTE — E-VISIT ESCALATED
Chief Complaint: Coronavirus (COVID-19), cold, sinus pain, allergy, or flu   Patient was shown the following escalation message:   Seek emergency medical care   Severe chest pain that radiates out to your shoulders, neck, arms, or jaw may be a sign of a health problem that needs immediate, in-person medical attention.   Go directly to the nearest emergency room (ER)! If you can't get to an ER, call 911 right away.   ----------   Patient Interview Transcript:   Why are you getting care through eVisit today? We can't guarantee a specific treatment or test. Your provider will decide what's best for you. Select all that apply.    I want a specific treatment or medication    I want to know if I have a cold or something more serious    I want to know if I need to be seen by a provider    I need a doctor's note    I just want to feel better!   Not selected:    I want to be tested for COVID-19    I want to get the COVID-19 vaccine    I think I'm having side effects from the COVID-19 vaccine    None of the above   Tell us which specific treatment or medication you'd like. Your provider will make the final decision on which treatment is best for your condition.    I believe I have bronchit   Which of these symptoms are bothering you? Select all that apply.    Cough    Shortness of breath    Fever    Stuffed-up nose or sinuses    Itchy or watery eyes    Sore throat    Hoarse voice or loss of voice    Headache    Sweats    Chills    Muscle or body aches    Fatigue or tiredness   Not selected:    Runny nose    Itchy nose or sneezing    Loss of smell or taste    Nausea or vomiting    Diarrhea    I don't have any of these symptoms   Before we learn more about why you're here, we'll get some information related to COVID-19. We'll ask about risk factors, testing, vaccination status, and exposure. Do you have any of these conditions? If so, you may be at increased risk for complications from COVID-19. Select all that apply.     Heart disease, such as congenital heart disease, congestive heart failure, or coronary artery disease   Not selected:    Chronic lung disease, such as cystic fibrosis or interstitial fibrosis    Disorder of the brain, spinal cord, or nerves and muscles, such as dementia, cerebral palsy, epilepsy, muscular dystrophy, or developmental delay    Metabolic disorder or mitochondrial disease    Cerebrovascular disease, such as stroke or another condition affecting the blood vessels or blood supply to the brain    None of the above   Do you live in a group care setting? Examples include: - Nursing home - Residential care - Psychiatric treatment facility - Group home - [facility] - [facility] and care home - Homeless shelter - Foster care setting Select one.    No   Not selected:    Yes   Have you ever been tested for COVID-19? Select one.    Yes   Not selected:    No   When was your most recent COVID-19 test? Select one.    Within the last week   Not selected:    7 to 14 days ago    15 to 30 days ago    1 to 3 months ago    More than 3 months ago   What type of COVID-19 test did you have? There are 2 types of COVID-19 tests: - Viral tests check if you're currently infected with COVID-19. - Antibody tests check if you've been infected in the past. Select one.    Viral test for current infection   Not selected:    Antibody test for past infection   What was the result of your most recent COVID-19 test? Select one.    Negative (no sign of infection)   Not selected:    Positive (signs of current or past infection)    I'm not sure   Have you gotten the COVID-19 vaccine? Select one.    Yes   Not selected:    No   How many doses of the COVID-19 vaccine have you gotten? This includes boosters. Select one.    3 doses   Not selected:    1 dose    2 doses   Which COVID-19 vaccine did you get for your first dose? Check your Vaccination Record Card under Product Name/. Select one.    Moderna   Not selected:    [employer]  "Mai Vaccine (J&J/Mai)    Pfizer-BioNTech (Pfizer)   Which COVID-19 vaccine did you get for your second dose? Check your Vaccination Record Card under Product Name/. Select one.    Moderna   Not selected:    [employer] Mai Vaccine (J&J/Mai)    Pfizer-BioNTech (Pfizer)   Which COVID-19 vaccine did you get for your third dose? Check your Vaccination Record Card under Product Name/. Select one.    Moderna   Not selected:    [employer]'s Mai Vaccine (J&J/Mai)    Pfizer-BioNTech (Pfizer)   When did you get your most recent dose of the COVID-19 vaccine?    More than 14 days ago   Not selected:    Less than 48 hours (2 days) ago    48 to 72 hours (3 days) ago    3 to 5 days ago    5 to 7 days ago    7 to 14 days ago   In the last 14 days, have you traveled outside of your local community? This includes travel by car, RV, bus, train, or plane. Travel increases your chances of getting and spreading COVID-19. Select one.    No   Not selected:    Yes   In the last 14 days, have you had close contact with someone who has coronavirus (COVID-19)? \"Close contact\" means any of these: - Living in the same household as someone with COVID-19. - Caring for someone with COVID-19. - Being within 6 feet of someone with COVID-19 for a total of at least 15 minutes over a 24-hour period. For example, three 5-minute exposures for a total of 15 minutes. - Being in direct contact with respiratory droplets from someone with COVID-19 (being coughed on, kissing, sharing utensils). Select one.    No, not that I know of   Not selected:    Yes, a confirmed case    Yes, a suspected case   Thanks for completing our COVID-19 questions. Now we'll return to your symptoms. When did your symptoms start? If you know the exact date your symptoms started, choose Other and enter the month and day. Select one.    3 to 5 days ago   Not selected:    Less than 48 hours ago    6 to 9 days ago    10 to 14 " "days ago    2 to 4 weeks ago    More than a month ago    Other (specify)   Did your symptoms come on suddenly or gradually? Select one.    Gradually   Not selected:    Suddenly    I'm not sure   How would you describe your shortness of breath? Sometimes shortness of breath can be a sign of a more serious condition. Select one.    Moderate (it's bad, but I can still do simple things like get dressed, bathe, or comb my hair)   Not selected:    Mild (about what I normally have with a cold)    Severe (it's so bad that I can't do simple things like get dressed, bathe, or comb my hair)   We'd like to find out more about your shortness of breath. Try to say the following sentence out loud: \"I went to the store today to buy bread, milk, and eggs.\" Are you able to get to the end of the sentence without stopping for breath? If not, you may need emergency care.    Yes   Not selected:    No   Are you able to take a full, deep breath? A full, deep breath means inhaling for 3 to 4 seconds. If you can't do this, you may need to seek emergency care. Select one.    Yes   Not selected:    No   You mentioned having a fever. Do you have a fever now? Select one.    Yes, but I didn't have one when my symptoms started   Not selected:    Yes, and I've had one since my symptoms started    No, it's gone now    I'm not sure   Did you take your temperature with a thermometer? Select one.    Yes   Not selected:    No, but it felt mild    No, but it felt high   What was the highest reading on the thermometer? Select one.    102.0 to 103.0F   Not selected:    Below 100.4F    100.4 to 101.5F    101.6 to 101.9F    Above 103.0F   How long have you had a fever? Select one.    Less than 24 hours   Not selected:    1 to 3 days    4 or more days   You mentioned having a headache. On a scale of 1 to 10, how severe is your headache pain? Select one.    Severe (7 to 9)   Not selected:    Mild (1 to 3)    Moderate (4 to 6)    Unbearable (10)    The worst " "headache of my life (10+)   Do you cough so hard that it's made you gag or vomit? By gag, we mean has your coughing made you choke or dry heave? Select all that apply.    No   Not selected:    Yes, my coughing has made me gag    Yes, my coughing has made me vomit   When is your cough the worst? Select all that apply.    In the morning, or when I wake up    At nighttime, or while I'm sleeping   Not selected:    During the day    I'm not sure   Are you coughing up mucus or phlegm? Select one.    Yes, a little   Not selected:    No, my cough is dry    Yes, a lot   What color is most of the mucus or phlegm that you're coughing up? Select one.    White/frothy   Not selected:    Clear    Yellow    Green    Red or pink    I'm not sure   You mentioned having a stuffy nose or sinus congestion. Do you feel pain or pressure in your sinuses?    Yes   Not selected:    No    I'm not sure   Where do you feel sinus pain or pressure?    In my forehead    Around my eyes    Behind my nose    In my upper teeth or jaw   Not selected:    In my cheeks    I'm not sure   When did you first notice your sinus pain or pressure? Select one.    5 to 9 days ago   Not selected:    Less than 5 days ago    10 to 14 days ago    2 to 4 weeks ago    1 month ago or longer   Does coughing, sneezing, or leaning forward make your sinuses feel worse? Select one.    Yes   Not selected:    No    I'm not sure   What color is your nasal drainage? Select one.    White   Not selected:    Clear    Yellow    Green    My nose is stuffed but not draining or running    I'm not sure   Is your nasal drainage thick or thin? Select one.    Thick   Not selected:    Thin    I'm not sure   Is there any drainage (mucus) going down the back of your throat? This kind of drainage is also called \"postnasal drip.\" Select one.    Yes   Not selected:    No    I'm not sure   Can you swallow liquids and solid foods? A sore throat may be painful when swallowing, but it shouldn't " prevent you from swallowing. Select one.    Yes, but it's painful   Not selected:    Yes, with ease    Yes, but it's uncomfortable    It's hard to swallow anything because it feels like liquids and food get stuck in my throat    No, I can't swallow anything, liquid or solid foods   Since your symptoms started, have you felt dizzy? Select one.    Yes, and it makes it hard to stand, walk, or do daily activities   Not selected:    Yes, but I can continue with my regular daily activities    No   Do you have chest pain? You might also feel it as discomfort, aching, tightness, or squeezing in the chest. Select one.    Yes   Not selected:    No   Which of these is true of your chest pain? Select one.    My chest hurts even when I'm not coughing   Not selected:    My chest hurts only when I cough   Which of these are true of your chest pain?    It feels like it's spreading into my shoulders, neck, arms, or jaw    It makes me sweat a lot more than usual   Not selected:    It feels like crushing pressure on my chest    It feels like a sharp, stabbing pain    It feels like it's coming from my heart    None of the above   ----------   Medical history   Medical history data does not currently exist for this patient.

## 2022-01-06 NOTE — PATIENT INSTRUCTIONS
COVID-19: What Your Test Results Mean  If you test positive for COVID-19  Take steps to help prevent the spread of COVID-19  Stay home.   Do not leave your home, except to get medical care. Do not visit public areas.  Get rest and stay hydrated.  Take over-the-counter medicines, such as acetaminophen, to help you feel better.  Stay in touch with your doctor.  Separate yourself from other people.   As much as possible, stay in a specific room and away from other people and pets in your home.  If you test negative for COVID-19  · You probably were not infected at the time your sample was collected.  · However, that does not mean you will not get sick.  · It is possible that you were very early in your infection when your sample was collected and that you could test positive later.  A negative test result does not mean you won't get sick later.  cdc.gov/coronavirus  05/30/2020  This information is not intended to replace advice given to you by your health care provider. Make sure you discuss any questions you have with your health care provider.  Document Revised: 07/13/2021 Document Reviewed: 07/13/2021  [employer] Patient Education © 2021 [employer]      Acute Bronchitis, Adult    Acute bronchitis is sudden or acute swelling of the air tubes (bronchi) in the lungs. Acute bronchitis causes these tubes to fill with mucus, which can make it hard to breathe. It can also cause coughing or wheezing.  In adults, acute bronchitis usually goes away within 2 weeks. A cough caused by bronchitis may last up to 3 weeks. Smoking, allergies, and asthma can make the condition worse.  What are the causes?  This condition can be caused by germs and by substances that irritate the lungs, including:  · Cold and flu viruses. The most common cause of this condition is the virus that causes the common cold.  · Bacteria.  · Substances that irritate the lungs, including:  ? Smoke from cigarettes and other forms of tobacco.  ? Dust and  pollen.  ? Fumes from chemical products, gases, or burned fuel.  ? Other materials that pollute indoor or outdoor air.  · Close contact with someone who has acute bronchitis.  What increases the risk?  The following factors may make you more likely to develop this condition:  · A weak body's defense system, also called the immune system.  · A condition that affects your lungs and breathing, such as asthma.  What are the signs or symptoms?  Common symptoms of this condition include:  · Lung and breathing problems, such as:  ? Coughing. This may bring up clear, yellow, or green mucus from your lungs (sputum).  ? Wheezing.  ? Having too much mucus in your lungs (chest congestion).  ? Having shortness of breath.  · A fever.  · Chills.  · Aches and pains, including:  ? Tightness in your chest and other body aches.  ? A sore throat.  How is this diagnosed?  This condition is usually diagnosed based on:  · Your symptoms and medical history.  · A physical exam.  You may also have other tests, including tests to rule out other conditions, such pneumonia. These tests include:  · A test of lung function.  · Test of a mucus sample to look for the presence of bacteria.  · Tests to check the oxygen level in your blood.  · Blood tests.  · Chest X-ray.  How is this treated?  Most cases of acute bronchitis clear up over time without treatment. Your health care provider may recommend:  · Drinking more fluids. This can thin your mucus, which may improve your breathing.  · Taking a medicine for a fever or cough.  · Using a device that gets medicine into your lungs (inhaler) to help improve breathing and control coughing.  · Using a vaporizer or a humidifier. These are machines that add water to the air to help you breathe better.  Follow these instructions at home:  Activity  · Get plenty of rest.  · Return to your normal activities as told by your health care provider. Ask your health care provider what activities are safe for  you.  Lifestyle  · Drink enough fluid to keep your urine pale yellow.  · Do not drink alcohol.  · Do not use any products that contain nicotine or tobacco, such as cigarettes, e-cigarettes, and chewing tobacco. If you need help quitting, ask your health care provider. Be aware that:  ? Your bronchitis will get worse if you smoke or breathe in other people's smoke (secondhand smoke).  ? Your lungs will heal faster if you quit smoking.  General instructions    · Take over-the-counter and prescription medicines only as told by your health care provider.  · Use an inhaler, vaporizer, or humidifier as told by your health care provider.  · If you have a sore throat, gargle with a salt-water mixture 3-4 times a day or as needed. To make a salt-water mixture, completely dissolve ½-1 tsp (3-6 g) of salt in 1 cup (237 mL) of warm water.  · Keep all follow-up visits as told by your health care provider. This is important.    How is this prevented?  To lower your risk of getting this condition again:  · Wash your hands often with soap and water. If soap and water are not available, use hand .  · Avoid contact with people who have cold symptoms.  · Try not to touch your mouth, nose, or eyes with your hands.  · Avoid places where there are fumes from chemicals. Breathing these fumes will make your condition worse.  · Get the flu shot every year.  Contact a health care provider if:  · Your symptoms do not improve after 2 weeks of treatment.  · You vomit more than once or twice.  · You have symptoms of dehydration such as:  ? Dark urine.  ? Dry skin or eyes.  ? Increased thirst.  ? Headaches.  ? Confusion.  ? Muscle cramps.  Get help right away if you:  · Cough up blood.  · Feel pain in your chest.  · Have severe shortness of breath.  · Faint or keep feeling like you are going to faint.  · Have a severe headache.  · Have fever or chills that get worse.  These symptoms may represent a serious problem that is an emergency.  Do not wait to see if the symptoms will go away. Get medical help right away. Call your local emergency services (911 in the [place] Do not drive yourself to the hospital.  Summary  · Acute bronchitis is sudden (acute) inflammation of the air tubes (bronchi) between the windpipe and the lungs. In adults, acute bronchitis usually goes away within 2 weeks, although coughing may last 3 weeks or longer  · Take over-the-counter and prescription medicines only as told by your health care provider.  · Drink enough fluid to keep your urine pale yellow.  · Contact a health care provider if your symptoms do not improve after 2 weeks of treatment.  · Get help right away if you cough up blood, faint, or have chest pain or shortness of breath.  This information is not intended to replace advice given to you by your health care provider. Make sure you discuss any questions you have with your health care provider.  Document Revised: 08/31/2020 Document Reviewed: 07/10/2020  [employer] Patient Education © 2021 [employer]      Fever, Adult         A fever is an increase in the body's temperature. It is usually defined as a temperature of 100.4°F (38°C) or higher. Brief mild or moderate fevers generally have no long-term effects, and they often do not need treatment. Moderate or high fevers may make you feel uncomfortable and can sometimes be a sign of a serious illness or disease. The sweating that may occur with repeated or prolonged fever may also cause a loss of fluid in the body (dehydration).  Fever is confirmed by taking a temperature with a thermometer. A measured temperature can vary with:  · Age.  · Time of day.  · Where in the body you take the temperature. Readings may vary if you place the thermometer:  ? In the mouth (oral).  ? In the rectum (rectal).  ? In the ear (tympanic).  ? Under the arm (axillary).  ? On the forehead (temporal).  Follow these instructions at home:  Medicines  · Take over-the counter and  prescription medicines only as told by your health care provider. Follow the dosing instructions carefully.  · If you were prescribed an antibiotic medicine, take it as told by your health care provider. Do not stop taking the antibiotic even if you start to feel better.  General instructions  · Watch your condition for any changes. Let your health care provider know about them.  · Rest as needed.  · Drink enough fluid to keep your urine pale yellow. This helps to prevent dehydration.  · Sponge yourself or bathe with room-temperature water to help reduce your body temperature as needed. Do not use ice water.  · Do not use too many blankets or wear clothes that are too heavy.  · If your fever may be caused by an infection that spreads from person to person (is contagious), such as a cold or the flu, you should stay home from work and public gatherings for at least 24 hours after your fever is gone. Your fever should be gone without the need to use medicines.  Contact a health care provider if:  · You vomit.  · You cannot eat or drink without vomiting.  · You have diarrhea.  · You have pain when you urinate.  · Your symptoms do not improve with treatment.  · You develop new symptoms.  · You develop excessive weakness.  Get help right away if:  · You have shortness of breath or have trouble breathing.  · You are dizzy or you faint.  · You are disoriented or confused.  · You develop signs of dehydration, such as:  ? Dark urine, very little urine, or no urine.  ? Cracked lips.  ? Dry mouth.  ? Sunken eyes.  ? Sleepiness.  ? Weakness.  · You develop severe pain in your abdomen.  · You have persistent vomiting or diarrhea.  · You develop a skin rash.  · Your symptoms suddenly get worse.  Summary  · A fever is an increase in the body's temperature. It is usually defined as a temperature of 100.4°F (38°C) or higher. Moderate or high fevers can sometimes be a sign of a serious illness or disease. The sweating that may occur  with repeated or prolonged fever may also cause dehydration.  · Pay attention to any changes in your symptoms and contact your health care provider if your symptoms do not improve with treatment.  · Take over-the counter and prescription medicines only as told by your health care provider. Follow the dosing instructions carefully.  · If your fever is from an infection that may be contagious, such as cold or flu, you should stay home from work and public gatherings for at least 24 hours after your fever is gone. Your fever should be gone without the need to use medicines.  · Get help right away if you develop signs of dehydration, such as dark urine, cracked lips, dry mouth, sunken eyes, sleepiness, or weakness.  This information is not intended to replace advice given to you by your health care provider. Make sure you discuss any questions you have with your health care provider.  Document Revised: 06/03/2019 Document Reviewed: 06/03/2019  [employer] Patient Education © 2021 [employer]

## 2022-01-10 PROCEDURE — U0004 COV-19 TEST NON-CDC HGH THRU: HCPCS | Performed by: FAMILY MEDICINE

## 2022-02-01 PROCEDURE — U0004 COV-19 TEST NON-CDC HGH THRU: HCPCS | Performed by: NURSE PRACTITIONER

## 2022-03-02 PROCEDURE — U0004 COV-19 TEST NON-CDC HGH THRU: HCPCS | Performed by: PHYSICIAN ASSISTANT

## 2022-03-14 ENCOUNTER — HOSPITAL ENCOUNTER (OUTPATIENT)
Dept: CARDIOLOGY | Facility: HOSPITAL | Age: 52
Discharge: HOME | End: 2022-03-14
Admitting: INTERNAL MEDICINE

## 2022-03-14 DIAGNOSIS — R06.02 SOB (SHORTNESS OF BREATH): ICD-10-CM

## 2022-03-14 DIAGNOSIS — R07.9 CHEST PAIN, UNSPECIFIED TYPE: ICD-10-CM

## 2022-03-14 DIAGNOSIS — I10 ESSENTIAL HYPERTENSION: ICD-10-CM

## 2022-03-14 DIAGNOSIS — I25.118 CORONARY ARTERY DISEASE OF NATIVE ARTERY OF NATIVE HEART WITH STABLE ANGINA PECTORIS: ICD-10-CM

## 2022-03-14 PROCEDURE — 25010000002 SULFUR HEXAFLUORIDE MICROSPH 60.7-25 MG RECONSTITUTED SUSPENSION: Performed by: INTERNAL MEDICINE

## 2022-03-14 PROCEDURE — 93306 TTE W/DOPPLER COMPLETE: CPT | Performed by: INTERNAL MEDICINE

## 2022-03-14 PROCEDURE — 93306 TTE W/DOPPLER COMPLETE: CPT

## 2022-03-14 RX ADMIN — SULFUR HEXAFLUORIDE 3 ML: KIT at 09:43

## 2022-03-15 LAB
BH CV ECHO MEAS - AO MAX PG (FULL): 2.6 MMHG
BH CV ECHO MEAS - AO MAX PG: 9 MMHG
BH CV ECHO MEAS - AO MEAN PG (FULL): 2 MMHG
BH CV ECHO MEAS - AO MEAN PG: 5 MMHG
BH CV ECHO MEAS - AO ROOT AREA (BSA CORRECTED): 1.3
BH CV ECHO MEAS - AO ROOT AREA: 8 CM^2
BH CV ECHO MEAS - AO ROOT DIAM: 3.2 CM
BH CV ECHO MEAS - AO V2 MAX: 153 CM/SEC
BH CV ECHO MEAS - AO V2 MEAN: 102 CM/SEC
BH CV ECHO MEAS - AO V2 VTI: 27.7 CM
BH CV ECHO MEAS - AVA(I,A): 2.5 CM^2
BH CV ECHO MEAS - AVA(I,D): 2.5 CM^2
BH CV ECHO MEAS - AVA(V,A): 2.6 CM^2
BH CV ECHO MEAS - AVA(V,D): 2.6 CM^2
BH CV ECHO MEAS - BSA(HAYCOCK): 2.8 M^2
BH CV ECHO MEAS - BSA: 2.5 M^2
BH CV ECHO MEAS - BZI_BMI: 57.7 KILOGRAMS/M^2
BH CV ECHO MEAS - BZI_METRIC_HEIGHT: 165.1 CM
BH CV ECHO MEAS - BZI_METRIC_WEIGHT: 157.4 KG
BH CV ECHO MEAS - EDV(CUBED): 101.8 ML
BH CV ECHO MEAS - EDV(MOD-SP2): 75 ML
BH CV ECHO MEAS - EDV(MOD-SP4): 98 ML
BH CV ECHO MEAS - EDV(TEICH): 100.8 ML
BH CV ECHO MEAS - EF(CUBED): 73.2 %
BH CV ECHO MEAS - EF(MOD-BP): 74 %
BH CV ECHO MEAS - EF(MOD-SP2): 74.7 %
BH CV ECHO MEAS - EF(MOD-SP4): 73.5 %
BH CV ECHO MEAS - EF(TEICH): 65 %
BH CV ECHO MEAS - ESV(CUBED): 27.3 ML
BH CV ECHO MEAS - ESV(MOD-SP2): 19 ML
BH CV ECHO MEAS - ESV(MOD-SP4): 26 ML
BH CV ECHO MEAS - ESV(TEICH): 35.3 ML
BH CV ECHO MEAS - FS: 35.5 %
BH CV ECHO MEAS - IVS/LVPW: 1
BH CV ECHO MEAS - IVSD: 0.89 CM
BH CV ECHO MEAS - LA DIMENSION: 3.8 CM
BH CV ECHO MEAS - LA/AO: 1.2
BH CV ECHO MEAS - LAT PEAK E' VEL: 8.8 CM/SEC
BH CV ECHO MEAS - LATERAL E/E' RATIO: 10
BH CV ECHO MEAS - LV DIASTOLIC VOL/BSA (35-75): 39.2 ML/M^2
BH CV ECHO MEAS - LV MASS(C)D: 136.5 GRAMS
BH CV ECHO MEAS - LV MASS(C)DI: 54.6 GRAMS/M^2
BH CV ECHO MEAS - LV MAX PG: 6.4 MMHG
BH CV ECHO MEAS - LV MEAN PG: 3 MMHG
BH CV ECHO MEAS - LV SYSTOLIC VOL/BSA (12-30): 10.4 ML/M^2
BH CV ECHO MEAS - LV V1 MAX: 126 CM/SEC
BH CV ECHO MEAS - LV V1 MEAN: 73.5 CM/SEC
BH CV ECHO MEAS - LV V1 VTI: 22.3 CM
BH CV ECHO MEAS - LVIDD: 4.7 CM
BH CV ECHO MEAS - LVIDS: 3 CM
BH CV ECHO MEAS - LVLD AP2: 7.6 CM
BH CV ECHO MEAS - LVLD AP4: 7.8 CM
BH CV ECHO MEAS - LVLS AP2: 5.8 CM
BH CV ECHO MEAS - LVLS AP4: 6.3 CM
BH CV ECHO MEAS - LVOT AREA (M): 3.1 CM^2
BH CV ECHO MEAS - LVOT AREA: 3.1 CM^2
BH CV ECHO MEAS - LVOT DIAM: 2 CM
BH CV ECHO MEAS - LVPWD: 0.87 CM
BH CV ECHO MEAS - MED PEAK E' VEL: 7.7 CM/SEC
BH CV ECHO MEAS - MEDIAL E/E' RATIO: 11.5
BH CV ECHO MEAS - MV A MAX VEL: 81.4 CM/SEC
BH CV ECHO MEAS - MV DEC TIME: 0.27 SEC
BH CV ECHO MEAS - MV E MAX VEL: 88.4 CM/SEC
BH CV ECHO MEAS - MV E/A: 1.1
BH CV ECHO MEAS - PA ACC SLOPE: 906 CM/SEC^2
BH CV ECHO MEAS - PA ACC TIME: 0.13 SEC
BH CV ECHO MEAS - PA PR(ACCEL): 21 MMHG
BH CV ECHO MEAS - RAP SYSTOLE: 3 MMHG
BH CV ECHO MEAS - RVSP: 22 MMHG
BH CV ECHO MEAS - SI(AO): 89.1 ML/M^2
BH CV ECHO MEAS - SI(CUBED): 29.8 ML/M^2
BH CV ECHO MEAS - SI(LVOT): 28 ML/M^2
BH CV ECHO MEAS - SI(MOD-SP2): 22.4 ML/M^2
BH CV ECHO MEAS - SI(MOD-SP4): 28.8 ML/M^2
BH CV ECHO MEAS - SI(TEICH): 26.2 ML/M^2
BH CV ECHO MEAS - SV(AO): 222.8 ML
BH CV ECHO MEAS - SV(CUBED): 74.6 ML
BH CV ECHO MEAS - SV(LVOT): 70.1 ML
BH CV ECHO MEAS - SV(MOD-SP2): 56 ML
BH CV ECHO MEAS - SV(MOD-SP4): 72 ML
BH CV ECHO MEAS - SV(TEICH): 65.6 ML
BH CV ECHO MEAS - TR MAX PG: 19 MMHG
BH CV ECHO MEAS - TR MAX VEL: 217 CM/SEC
BH CV ECHO MEASUREMENTS AVERAGE E/E' RATIO: 10.72

## 2022-03-16 ENCOUNTER — TELEPHONE (OUTPATIENT)
Dept: CARDIOLOGY | Facility: CLINIC | Age: 52
End: 2022-03-16

## 2022-03-16 NOTE — TELEPHONE ENCOUNTER
----- Message from Pratik White MD sent at 3/15/2022  5:37 PM EDT -----  Please inform the patient of their test results. Thank you.

## 2022-03-18 DIAGNOSIS — Z86.718 HISTORY OF DVT OF LOWER EXTREMITY: ICD-10-CM

## 2022-03-18 RX ORDER — APIXABAN 5 MG/1
TABLET, FILM COATED ORAL
Qty: 60 TABLET | Refills: 0 | Status: SHIPPED | OUTPATIENT
Start: 2022-03-18 | End: 2022-07-01

## 2022-04-16 DIAGNOSIS — E03.8 HYPOTHYROIDISM DUE TO HASHIMOTO'S THYROIDITIS: ICD-10-CM

## 2022-04-16 DIAGNOSIS — E06.3 HYPOTHYROIDISM DUE TO HASHIMOTO'S THYROIDITIS: ICD-10-CM

## 2022-04-16 RX ORDER — LEVOTHYROXINE SODIUM 0.03 MG/1
TABLET ORAL
Qty: 90 TABLET | Refills: 0 | Status: SHIPPED | OUTPATIENT
Start: 2022-04-16 | End: 2022-07-28 | Stop reason: SDUPTHER

## 2022-04-16 RX ORDER — LEVOTHYROXINE SODIUM 0.2 MG/1
TABLET ORAL
Qty: 90 TABLET | Refills: 0 | Status: SHIPPED | OUTPATIENT
Start: 2022-04-16 | End: 2022-07-28 | Stop reason: SDUPTHER

## 2022-06-30 DIAGNOSIS — Z86.718 HISTORY OF DVT OF LOWER EXTREMITY: ICD-10-CM

## 2022-07-01 RX ORDER — APIXABAN 5 MG/1
TABLET, FILM COATED ORAL
Qty: 60 TABLET | Refills: 0 | Status: SHIPPED | OUTPATIENT
Start: 2022-07-01 | End: 2022-07-28 | Stop reason: SDUPTHER

## 2022-07-23 ENCOUNTER — HOSPITAL ENCOUNTER (OUTPATIENT)
Facility: HOSPITAL | Age: 52
Setting detail: OBSERVATION
Discharge: HOME | End: 2022-07-25
Attending: EMERGENCY MEDICINE | Admitting: INTERNAL MEDICINE

## 2022-07-23 ENCOUNTER — APPOINTMENT (OUTPATIENT)
Dept: CT IMAGING | Facility: HOSPITAL | Age: 52
End: 2022-07-23

## 2022-07-23 ENCOUNTER — APPOINTMENT (OUTPATIENT)
Dept: GENERAL RADIOLOGY | Facility: HOSPITAL | Age: 52
End: 2022-07-23

## 2022-07-23 DIAGNOSIS — R55 SYNCOPE AND COLLAPSE: ICD-10-CM

## 2022-07-23 DIAGNOSIS — R07.9 CHEST PAIN, UNSPECIFIED TYPE: Primary | ICD-10-CM

## 2022-07-23 DIAGNOSIS — R55 SYNCOPE, UNSPECIFIED SYNCOPE TYPE: ICD-10-CM

## 2022-07-23 PROBLEM — E87.6 HYPOKALEMIA: Status: ACTIVE | Noted: 2022-07-23

## 2022-07-23 PROBLEM — R93.0 ABNORMAL CT OF THE HEAD: Status: ACTIVE | Noted: 2022-07-23

## 2022-07-23 LAB
ALBUMIN SERPL-MCNC: 4 G/DL (ref 3.5–5.2)
ALBUMIN/GLOB SERPL: 1.3 G/DL
ALP SERPL-CCNC: 99 U/L (ref 39–117)
ALT SERPL W P-5'-P-CCNC: 35 U/L (ref 1–33)
ANION GAP SERPL CALCULATED.3IONS-SCNC: 10 MMOL/L (ref 5–15)
AST SERPL-CCNC: 35 U/L (ref 1–32)
BASOPHILS # BLD AUTO: 0.07 10*3/MM3 (ref 0–0.2)
BASOPHILS NFR BLD AUTO: 0.8 % (ref 0–1.5)
BILIRUB SERPL-MCNC: 0.4 MG/DL (ref 0–1.2)
BUN SERPL-MCNC: 11 MG/DL (ref 6–20)
BUN/CREAT SERPL: 11.1 (ref 7–25)
CALCIUM SPEC-SCNC: 9 MG/DL (ref 8.6–10.5)
CHLORIDE SERPL-SCNC: 103 MMOL/L (ref 98–107)
CO2 SERPL-SCNC: 26 MMOL/L (ref 22–29)
CREAT SERPL-MCNC: 0.99 MG/DL (ref 0.57–1)
DEPRECATED RDW RBC AUTO: 42.4 FL (ref 37–54)
EGFRCR SERPLBLD CKD-EPI 2021: 69.6 ML/MIN/1.73
EOSINOPHIL # BLD AUTO: 0.12 10*3/MM3 (ref 0–0.4)
EOSINOPHIL NFR BLD AUTO: 1.4 % (ref 0.3–6.2)
ERYTHROCYTE [DISTWIDTH] IN BLOOD BY AUTOMATED COUNT: 13.6 % (ref 12.3–15.4)
FLUAV RNA RESP QL NAA+PROBE: NOT DETECTED
FLUBV RNA RESP QL NAA+PROBE: NOT DETECTED
GLOBULIN UR ELPH-MCNC: 3 GM/DL
GLUCOSE SERPL-MCNC: 148 MG/DL (ref 65–99)
HBA1C MFR BLD: 6.4 % (ref 4.8–5.6)
HCT VFR BLD AUTO: 41.1 % (ref 34–46.6)
HGB BLD-MCNC: 13.3 G/DL (ref 12–15.9)
HOLD SPECIMEN: NORMAL
IMM GRANULOCYTES # BLD AUTO: 0.09 10*3/MM3 (ref 0–0.05)
IMM GRANULOCYTES NFR BLD AUTO: 1.1 % (ref 0–0.5)
LYMPHOCYTES # BLD AUTO: 3.03 10*3/MM3 (ref 0.7–3.1)
LYMPHOCYTES NFR BLD AUTO: 36.5 % (ref 19.6–45.3)
MCH RBC QN AUTO: 27.7 PG (ref 26.6–33)
MCHC RBC AUTO-ENTMCNC: 32.4 G/DL (ref 31.5–35.7)
MCV RBC AUTO: 85.4 FL (ref 79–97)
MONOCYTES # BLD AUTO: 0.41 10*3/MM3 (ref 0.1–0.9)
MONOCYTES NFR BLD AUTO: 4.9 % (ref 5–12)
NEUTROPHILS NFR BLD AUTO: 4.58 10*3/MM3 (ref 1.7–7)
NEUTROPHILS NFR BLD AUTO: 55.3 % (ref 42.7–76)
NRBC BLD AUTO-RTO: 0 /100 WBC (ref 0–0.2)
NT-PROBNP SERPL-MCNC: 100.5 PG/ML (ref 0–900)
PLATELET # BLD AUTO: 317 10*3/MM3 (ref 140–450)
PMV BLD AUTO: 9.9 FL (ref 6–12)
POTASSIUM SERPL-SCNC: 3.3 MMOL/L (ref 3.5–5.2)
PROT SERPL-MCNC: 7 G/DL (ref 6–8.5)
RBC # BLD AUTO: 4.81 10*6/MM3 (ref 3.77–5.28)
SARS-COV-2 RNA RESP QL NAA+PROBE: NOT DETECTED
SODIUM SERPL-SCNC: 139 MMOL/L (ref 136–145)
TROPONIN T SERPL-MCNC: <0.01 NG/ML (ref 0–0.03)
TROPONIN T SERPL-MCNC: <0.01 NG/ML (ref 0–0.03)
TSH SERPL DL<=0.05 MIU/L-ACNC: 1.99 UIU/ML (ref 0.27–4.2)
WBC NRBC COR # BLD: 8.3 10*3/MM3 (ref 3.4–10.8)
WHOLE BLOOD HOLD COAG: NORMAL
WHOLE BLOOD HOLD SPECIMEN: NORMAL

## 2022-07-23 PROCEDURE — 80050 GENERAL HEALTH PANEL: CPT

## 2022-07-23 PROCEDURE — 93005 ELECTROCARDIOGRAM TRACING: CPT | Performed by: EMERGENCY MEDICINE

## 2022-07-23 PROCEDURE — 93005 ELECTROCARDIOGRAM TRACING: CPT

## 2022-07-23 PROCEDURE — C9803 HOPD COVID-19 SPEC COLLECT: HCPCS

## 2022-07-23 PROCEDURE — 70450 CT HEAD/BRAIN W/O DYE: CPT

## 2022-07-23 PROCEDURE — 83036 HEMOGLOBIN GLYCOSYLATED A1C: CPT | Performed by: NURSE PRACTITIONER

## 2022-07-23 PROCEDURE — 0 IOPAMIDOL PER 1 ML: Performed by: EMERGENCY MEDICINE

## 2022-07-23 PROCEDURE — 99220 PR INITIAL OBSERVATION CARE/DAY 70 MINUTES: CPT | Performed by: NURSE PRACTITIONER

## 2022-07-23 PROCEDURE — 99284 EMERGENCY DEPT VISIT MOD MDM: CPT

## 2022-07-23 PROCEDURE — 84484 ASSAY OF TROPONIN QUANT: CPT

## 2022-07-23 PROCEDURE — G0378 HOSPITAL OBSERVATION PER HR: HCPCS

## 2022-07-23 PROCEDURE — 71275 CT ANGIOGRAPHY CHEST: CPT

## 2022-07-23 PROCEDURE — 71045 X-RAY EXAM CHEST 1 VIEW: CPT

## 2022-07-23 PROCEDURE — 87636 SARSCOV2 & INF A&B AMP PRB: CPT | Performed by: EMERGENCY MEDICINE

## 2022-07-23 PROCEDURE — 83880 ASSAY OF NATRIURETIC PEPTIDE: CPT

## 2022-07-23 PROCEDURE — 84484 ASSAY OF TROPONIN QUANT: CPT | Performed by: NURSE PRACTITIONER

## 2022-07-23 RX ORDER — POTASSIUM CHLORIDE 750 MG/1
40 CAPSULE, EXTENDED RELEASE ORAL AS NEEDED
Status: DISCONTINUED | OUTPATIENT
Start: 2022-07-23 | End: 2022-07-25 | Stop reason: HOSPADM

## 2022-07-23 RX ORDER — LEVOTHYROXINE SODIUM 0.03 MG/1
25 TABLET ORAL
Status: DISCONTINUED | OUTPATIENT
Start: 2022-07-24 | End: 2022-07-25 | Stop reason: HOSPADM

## 2022-07-23 RX ORDER — MULTIPLE VITAMINS W/ MINERALS TAB 9MG-400MCG
1 TAB ORAL DAILY
Status: DISCONTINUED | OUTPATIENT
Start: 2022-07-24 | End: 2022-07-25 | Stop reason: HOSPADM

## 2022-07-23 RX ORDER — CHOLESTYRAMINE LIGHT 4 G/5.7G
2 POWDER, FOR SUSPENSION ORAL EVERY 12 HOURS SCHEDULED
Status: DISCONTINUED | OUTPATIENT
Start: 2022-07-23 | End: 2022-07-25 | Stop reason: HOSPADM

## 2022-07-23 RX ORDER — DOCUSATE SODIUM 100 MG/1
100 CAPSULE, LIQUID FILLED ORAL 2 TIMES DAILY PRN
Status: DISCONTINUED | OUTPATIENT
Start: 2022-07-23 | End: 2022-07-25 | Stop reason: HOSPADM

## 2022-07-23 RX ORDER — ATORVASTATIN CALCIUM 40 MG/1
40 TABLET, FILM COATED ORAL NIGHTLY
Status: DISCONTINUED | OUTPATIENT
Start: 2022-07-23 | End: 2022-07-25 | Stop reason: HOSPADM

## 2022-07-23 RX ORDER — CHOLECALCIFEROL (VITAMIN D3) 125 MCG
5 CAPSULE ORAL NIGHTLY PRN
Status: DISCONTINUED | OUTPATIENT
Start: 2022-07-23 | End: 2022-07-25 | Stop reason: HOSPADM

## 2022-07-23 RX ORDER — SODIUM CHLORIDE 0.9 % (FLUSH) 0.9 %
10 SYRINGE (ML) INJECTION AS NEEDED
Status: DISCONTINUED | OUTPATIENT
Start: 2022-07-23 | End: 2022-07-25 | Stop reason: HOSPADM

## 2022-07-23 RX ORDER — NIFEDIPINE 60 MG/1
60 TABLET, EXTENDED RELEASE ORAL DAILY
Status: DISCONTINUED | OUTPATIENT
Start: 2022-07-24 | End: 2022-07-25 | Stop reason: HOSPADM

## 2022-07-23 RX ORDER — AMOXICILLIN 250 MG
2 CAPSULE ORAL NIGHTLY PRN
Status: DISCONTINUED | OUTPATIENT
Start: 2022-07-23 | End: 2022-07-25 | Stop reason: HOSPADM

## 2022-07-23 RX ORDER — ACETAMINOPHEN 325 MG/1
650 TABLET ORAL EVERY 4 HOURS PRN
Status: DISCONTINUED | OUTPATIENT
Start: 2022-07-23 | End: 2022-07-25 | Stop reason: HOSPADM

## 2022-07-23 RX ORDER — DEXTROSE MONOHYDRATE 25 G/50ML
25 INJECTION, SOLUTION INTRAVENOUS
Status: DISCONTINUED | OUTPATIENT
Start: 2022-07-23 | End: 2022-07-25 | Stop reason: HOSPADM

## 2022-07-23 RX ORDER — INSULIN LISPRO 100 [IU]/ML
0-7 INJECTION, SOLUTION INTRAVENOUS; SUBCUTANEOUS
Status: DISCONTINUED | OUTPATIENT
Start: 2022-07-24 | End: 2022-07-25 | Stop reason: HOSPADM

## 2022-07-23 RX ORDER — SODIUM CHLORIDE 0.9 % (FLUSH) 0.9 %
10 SYRINGE (ML) INJECTION EVERY 12 HOURS SCHEDULED
Status: DISCONTINUED | OUTPATIENT
Start: 2022-07-23 | End: 2022-07-25 | Stop reason: HOSPADM

## 2022-07-23 RX ORDER — POTASSIUM CHLORIDE 1.5 G/1.77G
40 POWDER, FOR SOLUTION ORAL AS NEEDED
Status: DISCONTINUED | OUTPATIENT
Start: 2022-07-23 | End: 2022-07-25 | Stop reason: HOSPADM

## 2022-07-23 RX ORDER — ASPIRIN 81 MG/1
81 TABLET ORAL DAILY
Status: DISCONTINUED | OUTPATIENT
Start: 2022-07-24 | End: 2022-07-25 | Stop reason: HOSPADM

## 2022-07-23 RX ORDER — LEVOTHYROXINE SODIUM 0.1 MG/1
200 TABLET ORAL
Status: DISCONTINUED | OUTPATIENT
Start: 2022-07-24 | End: 2022-07-25 | Stop reason: HOSPADM

## 2022-07-23 RX ORDER — NICOTINE POLACRILEX 4 MG
15 LOZENGE BUCCAL
Status: DISCONTINUED | OUTPATIENT
Start: 2022-07-23 | End: 2022-07-25 | Stop reason: HOSPADM

## 2022-07-23 RX ORDER — POTASSIUM CHLORIDE 7.45 MG/ML
10 INJECTION INTRAVENOUS
Status: DISCONTINUED | OUTPATIENT
Start: 2022-07-23 | End: 2022-07-25 | Stop reason: HOSPADM

## 2022-07-23 RX ADMIN — IOPAMIDOL 75 ML: 755 INJECTION, SOLUTION INTRAVENOUS at 15:49

## 2022-07-23 RX ADMIN — POTASSIUM CHLORIDE 40 MEQ: 750 CAPSULE, EXTENDED RELEASE ORAL at 21:08

## 2022-07-23 RX ADMIN — CHOLESTYRAMINE 8 G: 4 POWDER, FOR SUSPENSION ORAL at 22:28

## 2022-07-23 RX ADMIN — ACETAMINOPHEN 325MG 650 MG: 325 TABLET ORAL at 21:08

## 2022-07-23 RX ADMIN — APIXABAN 5 MG: 5 TABLET, FILM COATED ORAL at 21:04

## 2022-07-23 RX ADMIN — Medication 10 ML: at 21:11

## 2022-07-23 RX ADMIN — ATORVASTATIN CALCIUM 40 MG: 40 TABLET, FILM COATED ORAL at 21:04

## 2022-07-23 RX ADMIN — METOPROLOL TARTRATE 25 MG: 25 TABLET, FILM COATED ORAL at 21:04

## 2022-07-23 NOTE — H&P
[facility] Medicine Services  Clinical Decision Unit (CDU)  History and Physical    Patient Name: Jazmín Hicks  : 1970  MRN: [id number]  Primary Care Physician: Isabel Hidalgo APRN  Date of admission: 2022  1:45 PM      Subjective   Subjective     Chief Complaint:  Chest Pain/ Syncope    HPI:  Jazmín Hicks is a 52 y.o. female with pmh significant for HTN, HLP, PCOS/ DM, CAD, Syncope, anxiety/ depression, PE/ DVT on chronic eliquis therapy presents for several days of midsternal chest pain and multiple syncopal events. Patient reports in past week started have CP in mid sternum that feels like pressure going through to shoulder blades. Pain radiates to left jaw and having extreme heaviness in bilateral arms. No dizziness, n/v, diaphoresis, but having syncopal events. Reports 4-5 syncopal events in last few days. Notices this happens after position changes- either lying to standing or sitting to standing and once in the shower when leaning head back. Patient reports this happening previously years ago and chest very similar to past events that have occurred over last 5 years. + CAD, but no stents, + CAD family history. Has followed with Dr. White in the past.       Review of Systems   Constitutional: Positive for activity change and fatigue.   HENT: Negative.    Eyes: Negative.    Respiratory: Positive for chest tightness and shortness of breath.    Cardiovascular: Positive for chest pain. Negative for palpitations and leg swelling.   Gastrointestinal: Negative.    Endocrine: Negative.    Musculoskeletal: Negative.    Skin: Negative.    Neurological: Positive for syncope and weakness.   Psychiatric/Behavioral: Negative.         All other systems reviewed and negative    Personal History     Past Medical History:   Diagnosis Date   • Abdominal pain    • Anemia    • Anxiety    • Arthritis    • Back pain    • Bronchitis    • Cardiovascular disease    •  Cataract    • Coronary artery disease    • Depression    • Diarrhea     chronic- since kid   • Diverticulitis    • DVT (deep venous thrombosis) (HCC)     left lower calf   • Eczema     stress induced   • Gall stones    • Hashimoto's disease    • Heart attack (HCC)     x5   • History of transfusion     no reaction to blood; age 16   • Hyperlipidemia    • Hypertension    • Hypothyroidism    • Kidney stone    • Lyme disease    • Migraine headache    • OCD (obsessive compulsive disorder)    • Panic attack    • PCOS (polycystic ovarian syndrome)     takes metformin   • PNA (pneumonia)    • Seasonal allergies    • Sleep apnea     cpap compliant   • Wears glasses              Past Surgical History:   Procedure Laterality Date   • BILATERAL BREAST REDUCTION     • CARDIAC CATHETERIZATION  10/2019   • CHOLECYSTECTOMY     • COLONOSCOPY N/A 8/25/2021    Procedure: Colonoscopy with polypectomy;  Surgeon: Lamont Rm MD;  Location: [facility] ENDOSCOPY;  Service: Gastroenterology;  Laterality: N/A;   • KNEE SURGERY Right     scopy   • TONSILLECTOMY         Family History: family history includes Anxiety disorder in her daughter; Cancer in her maternal grandmother, mother, and sister; Colon polyps in her brother, brother, mother, sister, and sister; Deep vein thrombosis in her father; Depression in her daughter and daughter; Diabetes in her paternal grandmother; Heart attack in her brother, brother, father, paternal grandfather, and sister; Heart disease in her brother, brother, father, paternal grandfather, and sister; Hypertension in her brother, brother, father, maternal grandmother, mother, paternal grandmother, sister, and sister; Hypothyroidism in her brother, brother, daughter, daughter, father, mother, sister, and sister; No Known Problems in her maternal grandfather; Scoliosis in her daughter; Stroke in her paternal grandmother; Thyroid disease in her mother. Otherwise pertinent FHx was reviewed and unremarkable.      Social History:  reports that she quit smoking about 5 years ago. Her smoking use included cigarettes. She has a 15.00 pack-year smoking history. She has never used smokeless tobacco. She reports current alcohol use. She reports that she does not use drugs.  Social History     Social History Narrative    Lives alone. Independent with ADLs       Medications:  Magnesium, NIFEdipine CC, Potassium, apixaban, aspirin, atorvastatin, colestipol, levothyroxine, metFORMIN, metoprolol tartrate, multivitamin with minerals, nitroglycerin, and valsartan-hydrochlorothiazide    Allergies   Allergen Reactions   • Capsaicin Shortness Of Breath   • Adhesive Tape Other (See Comments)     blisters   • Darvon [Propoxyphene] GI Intolerance   • Lisinopril Nausea Only       Objective   Objective     Vital Signs:   Temp:  [98.2 °F (36.8 °C)] 98.2 °F (36.8 °C)  Heart Rate:  [] 91  Resp:  [26] 26  BP: (137-195)/() 151/78    Physical Exam   Constitutional: No acute distress, awake, alert. BMI >58  HENT: NCAT, mucous membranes moist  Respiratory: Clear to auscultation bilaterally, respiratory effort normal   Cardiovascular: RRR, no murmurs, rubs, or gallops  Gastrointestinal: Positive bowel sounds, soft, nontender, nondistended  Musculoskeletal: No bilateral ankle edema  Psychiatric: Appropriate affect, cooperative  Neurologic: Oriented x 3, strength symmetric in all extremities, Cranial Nerves grossly intact to confrontation, speech clear  Skin: No rashes      Results Reviewed:  I have personally reviewed most recent data and agree with findings, most notably:     LAB RESULTS:      Lab 07/23/22  1402   WBC 8.30   HEMOGLOBIN 13.3   HEMATOCRIT 41.1   PLATELETS 317   NEUTROS ABS 4.58   IMMATURE GRANS (ABS) 0.09*   LYMPHS ABS 3.03   MONOS ABS 0.41   EOS ABS 0.12   MCV 85.4         Lab 07/23/22  1402   SODIUM 139   POTASSIUM 3.3*   CHLORIDE 103   CO2 26.0   ANION GAP 10.0   BUN 11   CREATININE 0.99   EGFR 69.6   GLUCOSE 148*    CALCIUM 9.0         Lab 07/23/22  1402   TOTAL PROTEIN 7.0   ALBUMIN 4.00   GLOBULIN 3.0   ALT (SGPT) 35*   AST (SGOT) 35*   BILIRUBIN 0.4   ALK PHOS 99         Lab 07/23/22  1402   PROBNP 100.5   TROPONIN T <0.010                 Brief Urine Lab Results     None        Microbiology Results (last 10 days)     Procedure Component Value - Date/Time    COVID PRE-OP / PRE-PROCEDURE SCREENING ORDER (NO ISOLATION) - Swab, Nasopharynx [[id number]]  (Normal) Collected: 07/23/22 1532    Lab Status: Final result Specimen: Swab from Nasopharynx Updated: 07/23/22 1609    Narrative:      The following orders were created for panel order COVID PRE-OP / PRE-PROCEDURE SCREENING ORDER (NO ISOLATION) - Swab, Nasopharynx.  Procedure                               Abnormality         Status                     ---------                               -----------         ------                     COVID-19 and FLU A/B PCR...[[id number]]  Normal              Final result                 Please view results for these tests on the individual orders.    COVID-19 and FLU A/B PCR - Swab, Nasopharynx [[id number]]  (Normal) Collected: 07/23/22 1532    Lab Status: Final result Specimen: Swab from Nasopharynx Updated: 07/23/22 1609     COVID19 Not Detected     Influenza A PCR Not Detected     Influenza B PCR Not Detected    Narrative:      Fact sheet for providers: https://www.fda.gov/media/309151/download    Fact sheet for patients: https://www.fda.gov/media/889572/download    Test performed by PCR.           Results for orders placed during the hospital encounter of 03/14/22    Adult Transthoracic Echo Complete W/ Cont if Necessary Per Protocol    Interpretation Summary  · Left ventricular ejection fraction appears to be 66 - 70%. Left ventricular systolic function is normal.  · Left ventricular diastolic function was normal.  · No significant structural or functional valvular disease.        Assessment & Plan   Assessment / Plan     Active  Hospital Problems    Diagnosis  POA   • Chest pain, unspecified type [R07.9]  Yes   • Syncope and collapse [R55]  Unknown   • Hypokalemia [E87.6]  Unknown   • Abnormal CT of the head [R93.0]  Unknown   • Anxiety [F41.9]  Yes   • PCOS (polycystic ovarian syndrome) [E28.2]  Yes   • DVT (deep venous thrombosis) ([facility]) [I82.409]  Yes   • Hypertension [I10]  Yes   • Hypothyroidism [E03.9]  Yes   • Sleep apnea [G47.30]  Yes   • Coronary artery disease of native artery of native heart with stable angina pectoris (HCC) [I25.118]  Yes     8/9/2019 [facility]: LCMA within normal limits, ectasia 50% ostial lesion in LAD, CIRC within normal limits, RCA within normal limits. EF 65%    8/9/19 Lexiscan: Medium, partially reversible defect in the mid inferolateral, mid anterolateral, apical lateral segments. The extent of this perfusion defect was mild to moderate. LVEF 73%         Plan:    Unstable Angina  CAD  -- ongoing/ constant, waxes and wanes without provocation.   -- continue asa/ eliquis/ statin, BB  -- troponin x 1 negative, trend x 3. EKG reviewed and unremarkable  -- Has followed with Dr. White in past  -- previous echo 3/2022 normal with EF 70%  -- CXR negative, CTA chest pending  -- lipids/ labs in am    Syncope  -- 5 episodes, 4 with collapse this past week  -- seems to occur with position changes or head tilting. Check orthostatic vs daily  -- CT head negative for stroke, dose have an irregularity at base of the skull with recs for MRI imaging, now ordered  -- check tsh, cortisol  -- echo pending    History of DVT/ PE  -- continue eliquis. Reports no missed dosing  -- CTA chest pending    Hypokalemia  -- replace    HTN/HLP  -- continue home meds    PCOS/ PreDM  -- hold metformin  -- low ssi  -- check A1c          CODE STATUS:    Level Of Support Discussed With: Patient  Code Status (Patient has no pulse and is not breathing): CPR (Attempt to Resuscitate)  Medical Interventions (Patient has pulse or is  breathing): Full Support            Discharge Blueprint (criteria for discharge):   CP resolved  No further syncope  Vital signs and labs stable    Arlette Villanueva, APRN  07/23/22

## 2022-07-23 NOTE — ED PROVIDER NOTES
Subjective   52-year-old female who presents for evaluation of chest pain and syncopal episode.  The patient reports that she has had 5 syncopal episodes in the last 3 days.  She reports that these occur whenever she tries to stand and ambulate.  She also describes constant substernal chest pain with radiation into the left neck and into the center of the back and between the shoulder blades.  She has a previous history of DVT and PE.  She states that she takes Eliquis as prescribed and has not missed any.  She denies any previous stent intervention.  She does have multiple risk factors for coronary artery disease including obesity, high blood pressure, high cholesterol, positive family history.  She does not smoke.  She reports her last catheterization was performed in 2019, and did not receive stent placement at that given time.  She reports she last saw Dr. White of cardiology and considers him her cardiologist.  She denies new or different medications.  She denies fever, body aches, or chills.  No reported cough or shortness of breath.  No reported sick contacts.  No other acute complaints.          Review of Systems   Constitutional: Negative for chills, fatigue and fever.   HENT: Negative for congestion, ear pain, postnasal drip, sinus pressure and sore throat.    Eyes: Negative for pain, redness and visual disturbance.   Respiratory: Negative for cough, chest tightness and shortness of breath.    Cardiovascular: Positive for chest pain. Negative for palpitations and leg swelling.   Gastrointestinal: Negative for abdominal pain, anal bleeding, blood in stool, diarrhea, nausea and vomiting.   Endocrine: Negative for polydipsia and polyuria.   Genitourinary: Negative for difficulty urinating, dysuria, frequency and urgency.   Musculoskeletal: Negative for arthralgias, back pain and neck pain.   Skin: Negative for pallor and rash.   Allergic/Immunologic: Negative for environmental allergies and immunocompromised  state.   Neurological: Positive for syncope. Negative for dizziness, weakness and headaches.   Hematological: Negative for adenopathy.   Psychiatric/Behavioral: Negative for confusion, self-injury and suicidal ideas. The patient is not nervous/anxious.    All other systems reviewed and are negative.      Past Medical History:   Diagnosis Date   • Abdominal pain    • Anemia    • Anxiety    • Arthritis    • Back pain    • Bronchitis    • Cardiovascular disease    • Cataract    • Coronary artery disease    • Depression    • Diarrhea     chronic- since kid   • Diverticulitis    • DVT (deep venous thrombosis) (HCC)     left lower calf   • Eczema     stress induced   • Gall stones    • Hashimoto's disease    • Heart attack (HCC)     x5   • History of transfusion     no reaction to blood; age 16   • Hyperlipidemia    • Hypertension    • Hypothyroidism    • Kidney stone    • Lyme disease    • Migraine headache    • OCD (obsessive compulsive disorder)    • Panic attack    • PCOS (polycystic ovarian syndrome)     takes metformin   • PNA (pneumonia)    • Seasonal allergies    • Sleep apnea     cpap compliant   • Wears glasses        Allergies   Allergen Reactions   • Capsaicin Shortness Of Breath   • Adhesive Tape Other (See Comments)     blisters   • Darvon [Propoxyphene] GI Intolerance   • Lisinopril Nausea Only       Past Surgical History:   Procedure Laterality Date   • BILATERAL BREAST REDUCTION     • CARDIAC CATHETERIZATION  10/2019   • CHOLECYSTECTOMY     • COLONOSCOPY N/A 8/25/2021    Procedure: Colonoscopy with polypectomy;  Surgeon: Lamont Rm MD;  Location: [facility];  Service: Gastroenterology;  Laterality: N/A;   • KNEE SURGERY Right     scopy   • TONSILLECTOMY         Family History   Problem Relation Age of Onset   • Cancer Mother         multiple myoloma    • Hypertension Mother    • Thyroid disease Mother    • Hypothyroidism Mother    • Colon polyps Mother    • Heart disease Father    •  Hypertension Father    • Deep vein thrombosis Father    • Hypothyroidism Father    • Heart attack Father    • Hypothyroidism Sister    • Hypertension Sister    • Heart disease Sister    • Cancer Sister         colorectal   • Colon polyps Sister    • Heart attack Sister    • Hypothyroidism Brother    • Hypertension Brother    • Heart disease Brother    • Colon polyps Brother    • Heart attack Brother    • Hypothyroidism Daughter    • Scoliosis Daughter    • Depression Daughter    • Hypertension Maternal Grandmother    • Cancer Maternal Grandmother         2 rounds of breast   • No Known Problems Maternal Grandfather    • Diabetes Paternal Grandmother    • Stroke Paternal Grandmother    • Hypertension Paternal Grandmother    • Heart disease Paternal Grandfather    • Heart attack Paternal Grandfather    • Hypothyroidism Sister    • Hypertension Sister    • Colon polyps Sister    • Hypothyroidism Brother    • Hypertension Brother    • Heart disease Brother    • Colon polyps Brother    • Heart attack Brother    • Hypothyroidism Daughter    • Anxiety disorder Daughter    • Depression Daughter        Social History     Socioeconomic History   • Marital status:    Tobacco Use   • Smoking status: Former Smoker     Packs/day: 0.75     Years: 20.00     Pack years: 15.00     Types: Cigarettes     Quit date:      Years since quittin.5   • Smokeless tobacco: Never Used   Vaping Use   • Vaping Use: Never used   Substance and Sexual Activity   • Alcohol use: Yes     Comment: wine 3x month   • Drug use: Never   • Sexual activity: Yes     Birth control/protection: I.U.D.     Comment: Mirena           Objective   Physical Exam  Vitals and nursing note reviewed.   Constitutional:       General: She is not in acute distress.     Appearance: Normal appearance. She is well-developed. She is obese. She is not toxic-appearing or diaphoretic.   HENT:      Head: Normocephalic and atraumatic.      Right Ear: External ear  normal.      Left Ear: External ear normal.      Nose: Nose normal.   Eyes:      General: Lids are normal.      Pupils: Pupils are equal, round, and reactive to light.   Neck:      Trachea: No tracheal deviation.   Cardiovascular:      Rate and Rhythm: Regular rhythm. Tachycardia present.      Pulses: No decreased pulses.      Heart sounds: Normal heart sounds. No murmur heard.    No friction rub. No gallop.   Pulmonary:      Effort: Pulmonary effort is normal. No respiratory distress.      Breath sounds: Normal breath sounds. No decreased breath sounds, wheezing, rhonchi or rales.   Abdominal:      General: Bowel sounds are normal.      Palpations: Abdomen is soft.      Tenderness: There is no abdominal tenderness. There is no guarding or rebound.   Musculoskeletal:         General: No deformity. Normal range of motion.      Cervical back: Normal range of motion and neck supple.   Lymphadenopathy:      Cervical: No cervical adenopathy.   Skin:     General: Skin is warm and dry.      Findings: No rash.   Neurological:      Mental Status: She is alert and oriented to person, place, and time.      Cranial Nerves: No cranial nerve deficit.      Sensory: No sensory deficit.   Psychiatric:         Speech: Speech normal.         Behavior: Behavior normal.         Thought Content: Thought content normal.         Judgment: Judgment normal.         Procedures           ED Course                                           MDM  Number of Diagnoses or Management Options  Chest pain, unspecified type: new and requires workup  Syncope, unspecified syncope type: new and requires workup  Diagnosis management comments: HEART Score for Major Cardiac Events - MDCalc  Calculated on Jul 23 2022 3:13 PM  4 points -> Moderate Score (4-6 points) Risk of MACE of 12-16.6%.    No acute ischemic changes on EKG.  Troponin is normal.  CT angiogram of chest is negative for lung disease or pulmonary embolism.    CT scan of the head shows no acute  abnormalities.  Labs are otherwise nonrevealing.    The patient is highly concerned and does not feel comfortable going home.    Discussed the patient with the hospitalist, Dr. Oseguera.  The hospital service will consult on the patient to determine status of admission.       Amount and/or Complexity of Data Reviewed  Clinical lab tests: ordered and reviewed  Tests in the radiology section of CPT®: ordered and reviewed  Decide to obtain previous medical records or to obtain history from someone other than the patient: yes  Review and summarize past medical records: yes  Discuss the patient with other providers: yes  Independent visualization of images, tracings, or specimens: yes        Final diagnoses:   Chest pain, unspecified type   Syncope, unspecified syncope type       ED Disposition  ED Disposition     ED Disposition   Decision to Admit    Condition   --    Comment   Level of Care: Telemetry [5]   Diagnosis: Chest pain, unspecified type [[id number]]   Bed Request Comments: CDU               No follow-up provider specified.       Medication List      No changes were made to your prescriptions during this visit.          Melissa Maxwell MD  07/23/22 1640

## 2022-07-24 ENCOUNTER — APPOINTMENT (OUTPATIENT)
Dept: CARDIOLOGY | Facility: HOSPITAL | Age: 52
End: 2022-07-24

## 2022-07-24 ENCOUNTER — APPOINTMENT (OUTPATIENT)
Dept: MRI IMAGING | Facility: HOSPITAL | Age: 52
End: 2022-07-24

## 2022-07-24 PROBLEM — D35.2 PITUITARY MACROADENOMA: Chronic | Status: ACTIVE | Noted: 2022-07-23

## 2022-07-24 PROBLEM — D35.2 PITUITARY MACROADENOMA: Status: ACTIVE | Noted: 2022-07-23

## 2022-07-24 LAB
BH CV ECHO MEAS - AO MAX PG: 8 MMHG
BH CV ECHO MEAS - AO MEAN PG: 4 MMHG
BH CV ECHO MEAS - AO ROOT DIAM: 2.7 CM
BH CV ECHO MEAS - AO V2 MAX: 141 CM/SEC
BH CV ECHO MEAS - AO V2 VTI: 30 CM
BH CV ECHO MEAS - AVA(I,D): 3 CM2
BH CV ECHO MEAS - EDV(CUBED): 110.6 ML
BH CV ECHO MEAS - EDV(MOD-SP2): 51.1 ML
BH CV ECHO MEAS - EDV(MOD-SP4): 94 ML
BH CV ECHO MEAS - EF(MOD-BP): 64.6 %
BH CV ECHO MEAS - EF(MOD-SP2): 61.1 %
BH CV ECHO MEAS - EF(MOD-SP4): 66.3 %
BH CV ECHO MEAS - ESV(CUBED): 35.9 ML
BH CV ECHO MEAS - ESV(MOD-SP2): 19.9 ML
BH CV ECHO MEAS - ESV(MOD-SP4): 31.7 ML
BH CV ECHO MEAS - FS: 31.3 %
BH CV ECHO MEAS - IVS/LVPW: 1.1 CM
BH CV ECHO MEAS - IVSD: 1.1 CM
BH CV ECHO MEAS - LA DIMENSION: 3.2 CM
BH CV ECHO MEAS - LAT PEAK E' VEL: 9.3 CM/SEC
BH CV ECHO MEAS - LV MASS(C)D: 181.9 GRAMS
BH CV ECHO MEAS - LV MAX PG: 6.4 MMHG
BH CV ECHO MEAS - LV MEAN PG: 3 MMHG
BH CV ECHO MEAS - LV V1 MAX: 126 CM/SEC
BH CV ECHO MEAS - LV V1 VTI: 28.5 CM
BH CV ECHO MEAS - LVIDD: 4.8 CM
BH CV ECHO MEAS - LVIDS: 3.3 CM
BH CV ECHO MEAS - LVOT AREA: 3.1 CM2
BH CV ECHO MEAS - LVOT DIAM: 2 CM
BH CV ECHO MEAS - LVPWD: 1 CM
BH CV ECHO MEAS - MED PEAK E' VEL: 8.4 CM/SEC
BH CV ECHO MEAS - MV A MAX VEL: 87.5 CM/SEC
BH CV ECHO MEAS - MV DEC SLOPE: 483 CM/SEC2
BH CV ECHO MEAS - MV DEC TIME: 0.22 MSEC
BH CV ECHO MEAS - MV E MAX VEL: 91.7 CM/SEC
BH CV ECHO MEAS - MV E/A: 1.05
BH CV ECHO MEAS - MV MAX PG: 4.1 MMHG
BH CV ECHO MEAS - MV MEAN PG: 2 MMHG
BH CV ECHO MEAS - MV P1/2T: 61.9 MSEC
BH CV ECHO MEAS - MV V2 VTI: 29 CM
BH CV ECHO MEAS - MVA(P1/2T): 3.6 CM2
BH CV ECHO MEAS - MVA(VTI): 3.1 CM2
BH CV ECHO MEAS - PA ACC TIME: 0.09 SEC
BH CV ECHO MEAS - PA PR(ACCEL): 40.8 MMHG
BH CV ECHO MEAS - RAP SYSTOLE: 3 MMHG
BH CV ECHO MEAS - RVSP: 21 MMHG
BH CV ECHO MEAS - SV(LVOT): 89.5 ML
BH CV ECHO MEAS - SV(MOD-SP2): 31.2 ML
BH CV ECHO MEAS - SV(MOD-SP4): 62.3 ML
BH CV ECHO MEAS - TAPSE (>1.6): 1.58 CM
BH CV ECHO MEAS - TR MAX PG: 18 MMHG
BH CV ECHO MEAS - TR MAX VEL: 212.3 CM/SEC
BH CV ECHO MEASUREMENTS AVERAGE E/E' RATIO: 10.36
BH CV VAS BP RIGHT ARM: NORMAL MMHG
BH CV XLRA - TDI S': 8.5 CM/SEC
CHOLEST SERPL-MCNC: 152 MG/DL (ref 0–200)
CORTIS SERPL-MCNC: 9.36 MCG/DL
FSH SERPL-ACNC: 16.4 MIU/ML
GLUCOSE BLDC GLUCOMTR-MCNC: 111 MG/DL (ref 70–130)
GLUCOSE BLDC GLUCOMTR-MCNC: 115 MG/DL (ref 70–130)
GLUCOSE BLDC GLUCOMTR-MCNC: 120 MG/DL (ref 70–130)
HDLC SERPL-MCNC: 32 MG/DL (ref 40–60)
LDLC SERPL CALC-MCNC: 97 MG/DL (ref 0–100)
LDLC/HDLC SERPL: 2.96 {RATIO}
LEFT ATRIUM VOLUME INDEX: 22 ML/M2
MAXIMAL PREDICTED HEART RATE: 170 BPM
PROLACTIN SERPL-MCNC: 13.2 NG/ML (ref 4.79–23.3)
QT INTERVAL: 354 MS
QTC INTERVAL: 459 MS
STRESS TARGET HR: 145 BPM
TRIGL SERPL-MCNC: 126 MG/DL (ref 0–150)
TROPONIN T SERPL-MCNC: <0.01 NG/ML (ref 0–0.03)
VLDLC SERPL-MCNC: 23 MG/DL (ref 5–40)

## 2022-07-24 PROCEDURE — 83001 ASSAY OF GONADOTROPIN (FSH): CPT | Performed by: FAMILY MEDICINE

## 2022-07-24 PROCEDURE — 70553 MRI BRAIN STEM W/O & W/DYE: CPT

## 2022-07-24 PROCEDURE — 80061 LIPID PANEL: CPT | Performed by: NURSE PRACTITIONER

## 2022-07-24 PROCEDURE — 0 GADOBENATE DIMEGLUMINE 529 MG/ML SOLUTION: Performed by: INTERNAL MEDICINE

## 2022-07-24 PROCEDURE — 82024 ASSAY OF ACTH: CPT | Performed by: FAMILY MEDICINE

## 2022-07-24 PROCEDURE — 93306 TTE W/DOPPLER COMPLETE: CPT

## 2022-07-24 PROCEDURE — 99214 OFFICE O/P EST MOD 30 MIN: CPT | Performed by: INTERNAL MEDICINE

## 2022-07-24 PROCEDURE — 84146 ASSAY OF PROLACTIN: CPT | Performed by: PHYSICIAN ASSISTANT

## 2022-07-24 PROCEDURE — G0378 HOSPITAL OBSERVATION PER HR: HCPCS

## 2022-07-24 PROCEDURE — 84305 ASSAY OF SOMATOMEDIN: CPT | Performed by: FAMILY MEDICINE

## 2022-07-24 PROCEDURE — 82533 TOTAL CORTISOL: CPT | Performed by: NURSE PRACTITIONER

## 2022-07-24 PROCEDURE — 84484 ASSAY OF TROPONIN QUANT: CPT | Performed by: NURSE PRACTITIONER

## 2022-07-24 PROCEDURE — 99226 PR SBSQ OBSERVATION CARE/DAY 35 MINUTES: CPT | Performed by: FAMILY MEDICINE

## 2022-07-24 PROCEDURE — 82962 GLUCOSE BLOOD TEST: CPT

## 2022-07-24 PROCEDURE — 93306 TTE W/DOPPLER COMPLETE: CPT | Performed by: INTERNAL MEDICINE

## 2022-07-24 PROCEDURE — A9577 INJ MULTIHANCE: HCPCS | Performed by: INTERNAL MEDICINE

## 2022-07-24 PROCEDURE — 94660 CPAP INITIATION&MGMT: CPT

## 2022-07-24 PROCEDURE — 94799 UNLISTED PULMONARY SVC/PX: CPT

## 2022-07-24 PROCEDURE — 99213 OFFICE O/P EST LOW 20 MIN: CPT | Performed by: PHYSICIAN ASSISTANT

## 2022-07-24 RX ADMIN — LEVOTHYROXINE SODIUM 200 MCG: 0.1 TABLET ORAL at 06:10

## 2022-07-24 RX ADMIN — ACETAMINOPHEN 325MG 650 MG: 325 TABLET ORAL at 06:25

## 2022-07-24 RX ADMIN — METOPROLOL TARTRATE 25 MG: 25 TABLET, FILM COATED ORAL at 21:00

## 2022-07-24 RX ADMIN — NIFEDIPINE 60 MG: 60 TABLET, EXTENDED RELEASE ORAL at 08:22

## 2022-07-24 RX ADMIN — GADOBENATE DIMEGLUMINE 20 ML: 529 INJECTION, SOLUTION INTRAVENOUS at 00:56

## 2022-07-24 RX ADMIN — ACETAMINOPHEN 325MG 650 MG: 325 TABLET ORAL at 15:59

## 2022-07-24 RX ADMIN — GADOBENATE DIMEGLUMINE 20 ML: 529 INJECTION, SOLUTION INTRAVENOUS at 02:59

## 2022-07-24 RX ADMIN — MULTIPLE VITAMINS W/ MINERALS TAB 1 TABLET: TAB at 08:21

## 2022-07-24 RX ADMIN — ACETAMINOPHEN 325MG 650 MG: 325 TABLET ORAL at 21:54

## 2022-07-24 RX ADMIN — LEVOTHYROXINE SODIUM 25 MCG: 25 TABLET ORAL at 06:10

## 2022-07-24 RX ADMIN — NITROGLYCERIN 1 INCH: 20 OINTMENT TOPICAL at 10:55

## 2022-07-24 RX ADMIN — CHOLESTYRAMINE 8 G: 4 POWDER, FOR SUSPENSION ORAL at 23:18

## 2022-07-24 RX ADMIN — ATORVASTATIN CALCIUM 40 MG: 40 TABLET, FILM COATED ORAL at 21:00

## 2022-07-24 RX ADMIN — APIXABAN 5 MG: 5 TABLET, FILM COATED ORAL at 08:21

## 2022-07-24 RX ADMIN — APIXABAN 5 MG: 5 TABLET, FILM COATED ORAL at 21:00

## 2022-07-24 RX ADMIN — HYDROCHLOROTHIAZIDE: 12.5 CAPSULE ORAL at 08:20

## 2022-07-24 RX ADMIN — POTASSIUM CHLORIDE 40 MEQ: 750 CAPSULE, EXTENDED RELEASE ORAL at 00:58

## 2022-07-24 RX ADMIN — Medication 10 ML: at 21:00

## 2022-07-24 RX ADMIN — Medication 10 ML: at 08:22

## 2022-07-24 RX ADMIN — METOPROLOL TARTRATE 25 MG: 25 TABLET, FILM COATED ORAL at 08:21

## 2022-07-24 NOTE — CONSULTS
Cardiology Consult    Jazmín Hicks  1970  [phone]  There is no work phone number on file.    07/24/22    DATE OF ADMISSION: 7/23/2022  [facility] Isabel Shin, APRN  [address] / [place] [id number]  Referring Provider: No ref. provider found     Chief Complaint   Patient presents with   • Chest Pain     Patient Active Problem List    Diagnosis    • Chest pain, unspecified type [R07.9]    • Syncope and collapse [R55]    • Hypokalemia [E87.6]    • Pituitary macroadenoma (HCC) [D35.2]    • Screen for colon cancer [Z12.11]    • History of DVT of lower extremity [Z86.718]    • Gastroenteritis [K52.9]    • Anxiety [F41.9]    • Depression [F32.A]    • OCD (obsessive compulsive disorder) [F42.9]    • Diverticulosis [K57.90]    • Family history of colon cancer [Z80.0]    • Morbidly obese (HCC) [E66.01]    • PCOS (polycystic ovarian syndrome) [E28.2]    • Hypertension [I10]    • Hypothyroidism [E03.9]    • DVT (deep venous thrombosis) (HCC) [I82.409]    • Sleep apnea [G47.30]    • Coronary artery disease of native artery of native heart with stable angina pectoris ([facility]) [I25.118]      Past Surgical History:   Procedure Laterality Date   • BILATERAL BREAST REDUCTION     • CARDIAC CATHETERIZATION  10/2019   • CHOLECYSTECTOMY     • COLONOSCOPY N/A 8/25/2021    Procedure: Colonoscopy with polypectomy;  Surgeon: Lamont Rm MD;  Location: [facility] ENDOSCOPY;  Service: Gastroenterology;  Laterality: N/A;   • KNEE SURGERY Right     scopy   • TONSILLECTOMY       Family History   Problem Relation Age of Onset   • Cancer Mother         multiple myoloma    • Hypertension Mother    • Thyroid disease Mother    • Hypothyroidism Mother    • Colon polyps Mother    • Heart disease Father    • Hypertension Father    • Deep vein thrombosis Father    • Hypothyroidism Father    • Heart attack Father    • Hypothyroidism Sister    • Hypertension Sister    • Heart disease Sister     • Cancer Sister         colorectal   • Colon polyps Sister    • Heart attack Sister    • Hypothyroidism Brother    • Hypertension Brother    • Heart disease Brother    • Colon polyps Brother    • Heart attack Brother    • Hypothyroidism Daughter    • Scoliosis Daughter    • Depression Daughter    • Hypertension Maternal Grandmother    • Cancer Maternal Grandmother         2 rounds of breast   • No Known Problems Maternal Grandfather    • Diabetes Paternal Grandmother    • Stroke Paternal Grandmother    • Hypertension Paternal Grandmother    • Heart disease Paternal Grandfather    • Heart attack Paternal Grandfather    • Hypothyroidism Sister    • Hypertension Sister    • Colon polyps Sister    • Hypothyroidism Brother    • Hypertension Brother    • Heart disease Brother    • Colon polyps Brother    • Heart attack Brother    • Hypothyroidism Daughter    • Anxiety disorder Daughter    • Depression Daughter      History of Present Illness:     Mrs. Hicks is a 52 year old WF with pmh significant for HTN, HLP, PCOS/ DM, CAD, Syncope, anxiety/ depression, PE/ DVT on chronic eliquis therapy who cardiology is asked to see today in consultation for further evaluation of CP and syncope. Patient was admitted to [facility] 7/23/22 after presentation to the ED for several days of midsternal chest pain and multiple syncopal events. Patient reports in past week she started to have CP in mid sternum that feels like pressure going through to shoulder blades. No n/v, diaphoresis, but having LH/Dizziness and syncopal events. Reports 4-5 syncopal events in last few days. She will experience a rapid onset of acute palptiations prior to syncope. This will occur in a warm shower or after position changes- either lying to standing or sitting to standing and once in the shower when leaning head back. Patient reports this happening previously years ago and chest very similar to past events that have occurred over last 5 years. She reports a  history of myocardial infarction is with last cath in 2019 in [facility] showed coronary ectasia of the LAD, normal RCA and circumflex, EF 65% at that time. She has had COVID 3 times in the last 2 years and feels her angina, dizziness, and palps have worsened since then.     Currently, she is feeling better. CP has resolved with NTG patch. EKG stable, troponins negative. VSS. No syncope since hospitalization.      Allergies   Allergen Reactions   • Capsaicin Shortness Of Breath   • Adhesive Tape Other (See Comments)     blisters   • Darvon [Propoxyphene] GI Intolerance   • Lisinopril Nausea Only       Prior to Admission Medications     Prescriptions Last Dose Informant Patient Reported? Taking?    nitroglycerin (NITROLINGUAL) 0.4 MG/SPRAY spray Unknown Self Yes No    Place 1 spray under the tongue Every 5 (Five) Minutes As Needed for Chest Pain.    atorvastatin (LIPITOR) 40 MG tablet 7/23/2022  No Yes    TAKE ONE TABLET BY MOUTH ONCE NIGHTLY    Eliquis 5 MG tablet tablet 7/23/2022  No Yes    TAKE ONE TABLET BY MOUTH EVERY 12 HOURS    levothyroxine (SYNTHROID, LEVOTHROID) 200 MCG tablet 7/23/2022  No Yes    TAKE ONE TABLET BY MOUTH DAILY WITH 25 MCG TABLET    Patient taking differently:  Take 200 mcg by mouth Every Morning.    levothyroxine (SYNTHROID, LEVOTHROID) 25 MCG tablet 7/23/2022  No Yes    TAKE ONE TABLET BY MOUTH DAILY    Patient taking differently:  Take 25 mcg by mouth Every Morning. Takes with 200 MCG for total of 225    metFORMIN (GLUCOPHAGE) 500 MG tablet 7/23/2022  No Yes    TAKE ONE TABLET BY MOUTH DAILY WITH BREAKFAST    metoprolol tartrate (LOPRESSOR) 25 MG tablet 7/23/2022  No Yes    TAKE ONE TABLET BY MOUTH TWICE A DAY    NIFEdipine CC (ADALAT CC) 60 MG 24 hr tablet 7/23/2022  No Yes    Take 1 tablet by mouth Daily.    valsartan-hydrochlorothiazide (DIOVAN-HCT) 80-12.5 MG per tablet 7/23/2022  No Yes    Take 1 tablet by mouth Daily.            Current Facility-Administered Medications:   •   acetaminophen (TYLENOL) tablet 650 mg, 650 mg, Oral, Q4H PRN, Rich, Arlette R, APRN, 650 mg at 07/24/22 0625  •  apixaban (ELIQUIS) tablet 5 mg, 5 mg, Oral, Q12H, Rich, Arlette R, APRN, 5 mg at 07/24/22 0821  •  aspirin EC tablet 81 mg, 81 mg, Oral, Daily, Vandana Villanuevaecca R, APRN  •  atorvastatin (LIPITOR) tablet 40 mg, 40 mg, Oral, Nightly, Rich, Arlette R, APRN, 40 mg at 07/23/22 2104  •  cholestyramine light packet 8 g, 2 packet, Oral, Q12H, Rich Arlette R, APRN, 8 g at 07/23/22 2228  •  dextrose (D50W) (25 g/50 mL) IV injection 25 g, 25 g, Intravenous, Q15 Min PRN, Rich Arlette R, APRN  •  dextrose (GLUTOSE) oral gel 15 g, 15 g, Oral, Q15 Min PRN, Vandana Villanuevaecca R, APRN  •  docusate sodium (COLACE) capsule 100 mg, 100 mg, Oral, BID PRN, Rich Arlette R, APRN  •  glucagon (human recombinant) (GLUCAGEN DIAGNOSTIC) injection 1 mg, 1 mg, Intramuscular, Q15 Min PRN, Rich Arlette R, APRN  •  Insulin Lispro (humaLOG) injection 0-7 Units, 0-7 Units, Subcutaneous, TID AC, Vandana Villanuevaecca R, APRN  •  levothyroxine (SYNTHROID, LEVOTHROID) tablet 200 mcg, 200 mcg, Oral, Q AM, Rich, Arlette R, APRN, 200 mcg at 07/24/22 0610  •  levothyroxine (SYNTHROID, LEVOTHROID) tablet 25 mcg, 25 mcg, Oral, Q AM, Rich Arlette R, APRN, 25 mcg at 07/24/22 0610  •  melatonin tablet 5 mg, 5 mg, Oral, Nightly PRN, Rich Arlette R, APRN  •  metoprolol tartrate (LOPRESSOR) tablet 25 mg, 25 mg, Oral, BID, Rich Arlette R, APRN, 25 mg at 07/24/22 0821  •  multivitamin with minerals 1 tablet, 1 tablet, Oral, Daily, Gulshan Villanuevaca R, APRN, 1 tablet at 07/24/22 0821  •  NIFEdipine XL (PROCARDIA XL) 24 hr tablet 60 mg, 60 mg, Oral, Daily, Rich Arlette R, APRN, 60 mg at 07/24/22 0822  •  nitroglycerin (NITROSTAT) ointment 1 inch, 1 inch, Topical, Q6H, Dede Pruett MD, 1 inch at 07/24/22 1055  •  potassium chloride (MICRO-K) CR capsule 40 mEq, 40 mEq, Oral, PRN, 40 mEq at 07/24/22 0058 **OR** potassium chloride (KLOR-CON)  packet 40 mEq, 40 mEq, Oral, PRN **OR** potassium chloride 10 mEq in 100 mL IVPB, 10 mEq, Intravenous, Q1H PRN, Arlette Villanueva APRTEZ  •  sennosides-docusate (PERICOLACE) 8.6-50 MG per tablet 2 tablet, 2 tablet, Oral, Nightly PRN, Rich Arlette MARGO, APRN  •  sodium chloride 0.9 % flush 10 mL, 10 mL, Intravenous, PRN, Melissa Maxwell MD  •  sodium chloride 0.9 % flush 10 mL, 10 mL, Intravenous, Q12H, Gulshan Villanuevaca MARGO APRN, 10 mL at 22 0822  •  sodium chloride 0.9 % flush 10 mL, 10 mL, Intravenous, PRN, Arlette Villanueva, APRN  •  valsartan (DIOVAN) 80 mg, hydroCHLOROthiazide (HYDRODIURIL) 12.5 mg, , Oral, Daily, Arlette Villanueva APRN, Given at 22 0820    Social History     Socioeconomic History   • Marital status:    Tobacco Use   • Smoking status: Former Smoker     Packs/day: 0.75     Years: 20.00     Pack years: 15.00     Types: Cigarettes     Quit date:      Years since quittin.5   • Smokeless tobacco: Never Used   Vaping Use   • Vaping Use: Never used   Substance and Sexual Activity   • Alcohol use: Yes     Comment: wine 3x month   • Drug use: Never   • Sexual activity: Yes     Birth control/protection: I.U.D.     Comment: Mirena       Family History   Problem Relation Age of Onset   • Cancer Mother         multiple myoloma    • Hypertension Mother    • Thyroid disease Mother    • Hypothyroidism Mother    • Colon polyps Mother    • Heart disease Father    • Hypertension Father    • Deep vein thrombosis Father    • Hypothyroidism Father    • Heart attack Father    • Hypothyroidism Sister    • Hypertension Sister    • Heart disease Sister    • Cancer Sister         colorectal   • Colon polyps Sister    • Heart attack Sister    • Hypothyroidism Brother    • Hypertension Brother    • Heart disease Brother    • Colon polyps Brother    • Heart attack Brother    • Hypothyroidism Daughter    • Scoliosis Daughter    • Depression Daughter    • Hypertension Maternal Grandmother    •  Cancer Maternal Grandmother         2 rounds of breast   • No Known Problems Maternal Grandfather    • Diabetes Paternal Grandmother    • Stroke Paternal Grandmother    • Hypertension Paternal Grandmother    • Heart disease Paternal Grandfather    • Heart attack Paternal Grandfather    • Hypothyroidism Sister    • Hypertension Sister    • Colon polyps Sister    • Hypothyroidism Brother    • Hypertension Brother    • Heart disease Brother    • Colon polyps Brother    • Heart attack Brother    • Hypothyroidism Daughter    • Anxiety disorder Daughter    • Depression Daughter        REVIEW OF SYSTEMS:   CONSTITUTIONAL:         No weight loss, fever, chills, weakness or fatigue. +CP, +Palps  HEENT:                            No visual loss, blurred vision, double vision, yellow sclerae.                                             No hearing loss, congestion, sore throat.   SKIN:                                No rashes, urticaria, ulcers, sores.     RESPIRATORY:               + shortness of breath, no hemoptysis, cough, sputum.   GI:                                     No anorexia, nausea, vomiting, diarrhea. No abdominal pain, melena.   :                                   No burning on urination, hematuria or increased frequency.  ENDOCRINE:                   No diaphoresis, cold or heat intolerance. No polyuria or polydipsia.   NEURO:                            No headache, +dizziness,+ syncope, no paralysis, ataxia, or parasthesias.                                            No change in bowel or bladder control. No history of CVA/TIA  MUSCULOSKELETAL:    No muscle, back pain, joint pain or stiffness.   HEMATOLOGY:               No anemia, bleeding, bruising. + hx of DVT/PE.  PSYCH:                            No history of depression, anxiety    Vitals:    07/24/22 0621 07/24/22 0625 07/24/22 1036 07/24/22 1131   BP: (!) 158/110 (!) 166/103  (!) 141/109   BP Location: Left arm Left arm  Left arm   Patient Position:  "Sitting Standing  Sitting   Pulse: 89 94     Resp:    18   Temp:    98.4 °F (36.9 °C)   TempSrc:    Oral   SpO2: 96% 98%     Weight:   (!) 159 kg (350 lb 8.5 oz)    Height:   165.1 cm (65\")          Vital Sign Min/Max for last 24 hours  Temp  Min: 97.9 °F (36.6 °C)  Max: 98.5 °F (36.9 °C)   BP  Min: 134/85  Max: 166/103   Pulse  Min: 74  Max: 94   Resp  Min: 18  Max: 18   SpO2  Min: 95 %  Max: 98 %   No data recorded      Intake/Output Summary (Last 24 hours) at 7/24/2022 1435  Last data filed at 7/24/2022 1200  Gross per 24 hour   Intake 960 ml   Output --   Net 960 ml             Physical Exam:  GEN: Obese,  No acute distress  HEENT: Normocephalic, Atraumatic, PERRLA, moist mucous membranes  NECK: supple, NO JVD, no thyromegaly, no lymphadenopathy  CARDIAC: S1S2  RRR no murmur, gallop, rub  LUNGS: Clear to ausculation, normal respiratory effort  ABDOMEN: Soft, nontender, normal bowel sounds  EXTREMITIES:No gross deformities,  No clubbing, cyanosis, or edema  SKIN: Warm, dry  NEURO: No focal deficits  PSYCHIATRIC: Normal affect and mood      I personally viewed and interpreted the patient's EKG/Telemetry/lab data    Data:   Results from last 7 days   Lab Units 07/23/22  1402   WBC 10*3/mm3 8.30   HEMOGLOBIN g/dL 13.3   HEMATOCRIT % 41.1   PLATELETS 10*3/mm3 317     Results from last 7 days   Lab Units 07/23/22  1402   SODIUM mmol/L 139   POTASSIUM mmol/L 3.3*   CHLORIDE mmol/L 103   CO2 mmol/L 26.0   BUN mg/dL 11   CREATININE mg/dL 0.99   GLUCOSE mg/dL 148*      Results from last 7 days   Lab Units 07/23/22  1402   HEMOGLOBIN A1C % 6.40*     Results from last 7 days   Lab Units 07/23/22  1740   TSH uIU/mL 1.990     Results from last 7 days   Lab Units 07/23/22  1402   PROBNP pg/mL 100.5         Results from last 7 days   Lab Units 07/24/22  0000 07/23/22  1740 07/23/22  1402   TROPONIN T ng/mL <0.010 <0.010 <0.010     Results from last 7 days   Lab Units 07/24/22  0351   CHOLESTEROL mg/dL 152   TRIGLYCERIDES mg/dL " 126   HDL CHOL mg/dL 32*   LDL CHOL mg/dL 97     Results from last 7 days   Lab Units 07/23/22  1402   PROBNP pg/mL 100.5       Intake/Output Summary (Last 24 hours) at 7/24/2022 1435  Last data filed at 7/24/2022 1200  Gross per 24 hour   Intake 960 ml   Output --   Net 960 ml       Chest X-Ray:  Imaging Results (Last 24 Hours)     Procedure Component Value Units Date/Time    MRI Brain With & Without Contrast [[id number]] Collected: 07/24/22 1032     Updated: 07/24/22 1051    Narrative:      DATE OF EXAM: 7/24/2022 2:02 AM     PROCEDURE: MRI BRAIN W WO CONTRAST-     INDICATIONS: irreglarity at skull base and pituitary adenoma suspected;  R07.9-Chest pain, unspecified; R55-Syncope and collapse     COMPARISON: No comparisons available.     TECHNIQUE: Multiplanar multisequence images of the brain were performed  prior to and after the uneventful intravenous contrast administration of  20 MultiHance.     FINDINGS:   No intra-axial foci of restricted diffusion or abnormal susceptibility.  There are no significant intra-axial signal abnormalities or foci of  restricted diffusion. The CSF spaces/ventricles are age appropriate.  There are no abnormal extra-axial fluid collections. Cerebellar tonsils  are in normal position. Major intracranial flow voids are present. There  is a lobular homogeneous mass that appears to arise within the sella  from the left side of the pituitary gland and protrudes into the  sphenoid sinus. The mass enhances diffusely but to a lesser degree than  the remaining normal pituitary gland. No dural tail is visualized. The  mass measures 1.4 cm (craniocaudal) by 1.4 cm (transverse) by 1.9 cm  (anteroposterior). There is no gross cavernous sinus invasion. There is  no suprasellar component. No skull abnormality is demonstrated.       Impression:         1. Negative brain  2. Findings are compatible with a pituitary macroadenoma with extension  into the sphenoid sinus. There is no gross cavernous  sinus invasion or  suprasellar component     This report was finalized on 7/24/2022 10:48 AM by Saúl Dye.       CT Angiogram Chest [[id number]] Collected: 07/23/22 1658     Updated: 07/23/22 1707    Narrative:      DATE OF EXAM: 7/23/2022 3:37 PM     PROCEDURE: CT ANGIOGRAM CHEST-     INDICATIONS: syncope     COMPARISON: 5/10/2021     TECHNIQUE: Contiguous axial imaging was obtained from the thoracic inlet  through the upper abdomen following the intravenous administration of 75  mL of Isovue 370. Reconstructed coronal and sagittal images were also  obtained. Automated exposure control and iterative reconstruction  methods were used.     The radiation dose reduction device was turned on for each scan per the  ALARA (As Low as Reasonably Achievable) protocol.     FINDINGS: There is suboptimal contrast opacification of the main  pulmonary arteries, but generally good contrast opacification of the  peripheral and smaller order vessels with Hounsfield unit measurements  of up to 275. Overall, this is sufficient to exclude significant embolic  disease and no evidence of pulmonary emboli are seen. Thoracic aorta  appears normal. No pericardial or pleural effusion or significant  mediastinal adenopathy is seen. No significant coronary artery  calcification is identified.     In the lungs, there is a new, tiny cluster of nodular opacities the left  anterior apex, over a roughly 5 cm area. This may represent new  granulomatous disease, although these are relatively ill-defined, with  an almost alveolar pattern. Accordingly, a small area of developing  lobar pneumonia may be present instead. No significant pulmonary  parenchymal disease is seen elsewhere.     Included images of the upper abdomen show diffuse fatty liver change.  Gallbladder is surgically absent. Spleen is not enlarged. No significant  abnormalities are noted of the pancreas adrenal glands, or upper renal  poles. Bony structures appear to be intact.           Impression:         1. No evidence of pulmonary embolic disease.  2. Small, approximately 5 cm area of the anterior left upper lobe, with  micronodular or early alveolar disease. With this appearance, either a  small focus of lobar pneumonia, or very focal granulomatous infection  could be present.  3. No evidence of active chest disease elsewhere.     This report was finalized on 7/23/2022 5:04 PM by Dr. Genaro Sheffield MD.       CT Head Without Contrast [[id number]] Collected: 07/23/22 1630     Updated: 07/23/22 1641    Narrative:      DATE OF EXAM: 7/23/2022 3:37 PM     PROCEDURE: CT HEAD WO CONTRAST-     INDICATIONS: syncope     COMPARISON: No comparisons available.     TECHNIQUE: Routine transaxial and coronal reconstruction images were  obtained through the head without the administration of contrast.  Automated exposure control and iterative reconstruction methods were  used.     The radiation dose reduction device was turned on for each scan per the  ALARA (As Low as Reasonably Achievable) protocol.     FINDINGS:  No acute trauma to the calvarium is identified. The included mastoid air  cells and middle ear spaces appear clear. Paranasal sinuses appear clear  except for an unusual area projecting into the left sphenoid sinus, with  partial thin bony covering possibly reflecting a defect in the floor of  the sella/calvarium. No generalized sellar enlargement is identified.  There is no evidence of any fluid in the sphenoid sinus or other  paranasal sinuses to suggest CSF leak. This is perhaps best appreciated  on the coronal imaging series, images 33 and 34.     Elsewhere, no significant focal or generalized brain atrophy is seen.  There is no evidence of hemorrhage, contusion, edema/infarct,  intra-axial mass or mass effect, hydrocephalus, or abnormal extra-axial  collection.       Impression:         1. Suspected incidental developmental variant of the skull base, with  what may be a bony defect of the base of  the skull. If patient has not  had previous MRI study, consider follow-up MRI of the brain and  pituitary.  2. No acute intracranial disease is identified.     This report was finalized on 7/23/2022 4:38 PM by Dr. Genaro Sheffield MD.             Telemetry: NSR with occasional PVC 74-94 bpm  EKG: Sinus tachycardia 101 bpm        Assessment and Plan:     1.  CAD  -Known hx of CAD with Possible previous embolic MI (deficient data). Worsening CP over the last week with radiation to back/shoulder blades. Relieved by Ntg. Troponins negative. No ischemic changes on EKG. Will order Stress test with PET tomorrow am.   -Echo, 7/24/22, EF 61-65%, Mild LVH  -Continue Eliquis 5 mg twice daily  -Continue  metoprolol 25 mg twice daily, Statin, ASA     2. Syncope  -orthostatics negative  -Newly dx pituitary adenoma, not felt to be factor in syncope.   -Normal Echo today, Stress test tomorrow as noted above, probable need to clear for surgery in the future if indicated for macroadenoma as outpatient.   -30 day holter at discharge.   -increase fluids, change positions slowly    3.  Palpitations:  -Continue BB, 30 day holter at discharge.     4.  New dx pituitary macroadenoma  - not source of syncope per NS  - t f/u with Dr. Styles outpatient for formal testing and surveillence      Disposition: Stress test with PET tomorrow. Continue BB, statin, ASA. 30 day Holter at discharge. Dr. White to resume care tomorrow.     Scribed for Dr. Metzger by BONITA Hollins. 7/24/2022 14:35 EDT

## 2022-07-24 NOTE — PROGRESS NOTES
[facility] Medicine Services  PROGRESS NOTE    Patient Name: Jazmín Hicks  : 1970  MRN: [id number]    Date of Admission: 2022  Primary Care Physician: Isabel Hidalgo APRN    Subjective   Subjective     CC:  Syncope    HPI:  No presyncope while here.  Sitting up eating lunch.  No active complaints today.  Discussed her MRI finding and POC for NS consult to discuss further assessment +/- treatment.     ROS:  Gen- No fevers, chills, HA, uncontrolled pain  CV- No chest pain, palpitations, new edema  Resp- (+)RAHMAN  GI- (+)chronic diarrhea with hx IBS, constipation  Skin - No rash         Objective   Objective     Vital Signs:   Temp:  [97.9 °F (36.6 °C)-98.5 °F (36.9 °C)] 97.9 °F (36.6 °C)  Heart Rate:  [] 94  Resp:  [18-26] 18  BP: (134-195)/() 166/103     Physical Exam:  Constitutional: No acute distress, awake, alert, nontoxic  HENT: Mucous membranes moist  Respiratory: Good effort, nonlabored respirations on RA  Cardiovascular: NSR tele  Gastrointestinal: Soft, nondistended  Musculoskeletal: No overt peripheral edema, normal muscle tone for age  Psychiatric: Appropriate affect, cooperative  Skin: No visible rashes or mottling        Results Reviewed:  LAB RESULTS:      Lab 22  1402   WBC 8.30   HEMOGLOBIN 13.3   HEMATOCRIT 41.1   PLATELETS 317   NEUTROS ABS 4.58   IMMATURE GRANS (ABS) 0.09*   LYMPHS ABS 3.03   MONOS ABS 0.41   EOS ABS 0.12   MCV 85.4         Lab 22  1740 22  1402   SODIUM  --  139   POTASSIUM  --  3.3*   CHLORIDE  --  103   CO2  --  26.0   ANION GAP  --  10.0   BUN  --  11   CREATININE  --  0.99   EGFR  --  69.6   GLUCOSE  --  148*   CALCIUM  --  9.0   HEMOGLOBIN A1C  --  6.40*   TSH 1.990  --          Lab 22  1402   TOTAL PROTEIN 7.0   ALBUMIN 4.00   GLOBULIN 3.0   ALT (SGPT) 35*   AST (SGOT) 35*   BILIRUBIN 0.4   ALK PHOS 99         Lab 22  0000 22  1740 22  1402   PROBNP  --   --  100.5    TROPONIN T <0.010 <0.010 <0.010         Lab 07/24/22  0351   CHOLESTEROL 152   LDL CHOL 97   HDL CHOL 32*   TRIGLYCERIDES 126             Brief Urine Lab Results     None          Microbiology Results Abnormal     Procedure Component Value - Date/Time    COVID PRE-OP / PRE-PROCEDURE SCREENING ORDER (NO ISOLATION) - Swab, Nasopharynx [[id number]]  (Normal) Collected: 07/23/22 1532    Lab Status: Final result Specimen: Swab from Nasopharynx Updated: 07/23/22 1609    Narrative:      The following orders were created for panel order COVID PRE-OP / PRE-PROCEDURE SCREENING ORDER (NO ISOLATION) - Swab, Nasopharynx.  Procedure                               Abnormality         Status                     ---------                               -----------         ------                     COVID-19 and FLU A/B PCR...[[id number]]  Normal              Final result                 Please view results for these tests on the individual orders.    COVID-19 and FLU A/B PCR - Swab, Nasopharynx [[id number]]  (Normal) Collected: 07/23/22 1532    Lab Status: Final result Specimen: Swab from Nasopharynx Updated: 07/23/22 1609     COVID19 Not Detected     Influenza A PCR Not Detected     Influenza B PCR Not Detected    Narrative:      Fact sheet for providers: https://www.fda.gov/media/588416/download    Fact sheet for patients: https://www.fda.gov/media/300160/download    Test performed by PCR.          CT Head Without Contrast    Result Date: 7/23/2022  DATE OF EXAM: 7/23/2022 3:37 PM  PROCEDURE: CT HEAD WO CONTRAST-  INDICATIONS: syncope  COMPARISON: No comparisons available.  TECHNIQUE: Routine transaxial and coronal reconstruction images were obtained through the head without the administration of contrast. Automated exposure control and iterative reconstruction methods were used.  The radiation dose reduction device was turned on for each scan per the ALARA (As Low as Reasonably Achievable) protocol.  FINDINGS: No acute trauma to the  calvarium is identified. The included mastoid air cells and middle ear spaces appear clear. Paranasal sinuses appear clear except for an unusual area projecting into the left sphenoid sinus, with partial thin bony covering possibly reflecting a defect in the floor of the sella/calvarium. No generalized sellar enlargement is identified. There is no evidence of any fluid in the sphenoid sinus or other paranasal sinuses to suggest CSF leak. This is perhaps best appreciated on the coronal imaging series, images 33 and 34.  Elsewhere, no significant focal or generalized brain atrophy is seen. There is no evidence of hemorrhage, contusion, edema/infarct, intra-axial mass or mass effect, hydrocephalus, or abnormal extra-axial collection.      Impression:  1. Suspected incidental developmental variant of the skull base, with what may be a bony defect of the base of the skull. If patient has not had previous MRI study, consider follow-up MRI of the brain and pituitary. 2. No acute intracranial disease is identified.  This report was finalized on 7/23/2022 4:38 PM by Dr. Genaro Sheffield MD.      MRI Brain With & Without Contrast    Result Date: 7/24/2022  DATE OF EXAM: 7/24/2022 2:02 AM  PROCEDURE: MRI BRAIN W WO CONTRAST-  INDICATIONS: irreglarity at skull base and pituitary adenoma suspected; R07.9-Chest pain, unspecified; R55-Syncope and collapse  COMPARISON: No comparisons available.  TECHNIQUE: Multiplanar multisequence images of the brain were performed prior to and after the uneventful intravenous contrast administration of 20 MultiHance.  FINDINGS: No intra-axial foci of restricted diffusion or abnormal susceptibility. There are no significant intra-axial signal abnormalities or foci of restricted diffusion. The CSF spaces/ventricles are age appropriate. There are no abnormal extra-axial fluid collections. Cerebellar tonsils are in normal position. Major intracranial flow voids are present. There is a lobular homogeneous  mass that appears to arise within the sella from the left side of the pituitary gland and protrudes into the sphenoid sinus. The mass enhances diffusely but to a lesser degree than the remaining normal pituitary gland. No dural tail is visualized. The mass measures 1.4 cm (craniocaudal) by 1.4 cm (transverse) by 1.9 cm (anteroposterior). There is no gross cavernous sinus invasion. There is no suprasellar component. No skull abnormality is demonstrated.      Impression:  1. Negative brain 2. Findings are compatible with a pituitary macroadenoma with extension into the sphenoid sinus. There is no gross cavernous sinus invasion or suprasellar component  This report was finalized on 7/24/2022 10:48 AM by Saúl Dye.      XR Chest 1 View    Result Date: 7/23/2022  DATE OF EXAM: 7/23/2022 2:12 PM  PROCEDURE: XR CHEST 1 VW-  INDICATIONS: SOA triage protocol  COMPARISON: 5/10/2021  TECHNIQUE: Single radiographic AP view of the chest was obtained.  FINDINGS: Heart mediastinum and pulmonary vasculature appear within normal limits. Lungs appear moderately well-expanded and clear. No effusion or pneumothorax is seen.      Impression: No evidence of active chest disease.  This report was finalized on 7/23/2022 2:24 PM by Dr. Genaro Sheffield MD.      CT Angiogram Chest    Result Date: 7/23/2022  DATE OF EXAM: 7/23/2022 3:37 PM  PROCEDURE: CT ANGIOGRAM CHEST-  INDICATIONS: syncope  COMPARISON: 5/10/2021  TECHNIQUE: Contiguous axial imaging was obtained from the thoracic inlet through the upper abdomen following the intravenous administration of 75 mL of Isovue 370. Reconstructed coronal and sagittal images were also obtained. Automated exposure control and iterative reconstruction methods were used.  The radiation dose reduction device was turned on for each scan per the ALARA (As Low as Reasonably Achievable) protocol.  FINDINGS: There is suboptimal contrast opacification of the main pulmonary arteries, but generally good contrast  opacification of the peripheral and smaller order vessels with Hounsfield unit measurements of up to 275. Overall, this is sufficient to exclude significant embolic disease and no evidence of pulmonary emboli are seen. Thoracic aorta appears normal. No pericardial or pleural effusion or significant mediastinal adenopathy is seen. No significant coronary artery calcification is identified.  In the lungs, there is a new, tiny cluster of nodular opacities the left anterior apex, over a roughly 5 cm area. This may represent new granulomatous disease, although these are relatively ill-defined, with an almost alveolar pattern. Accordingly, a small area of developing lobar pneumonia may be present instead. No significant pulmonary parenchymal disease is seen elsewhere.  Included images of the upper abdomen show diffuse fatty liver change. Gallbladder is surgically absent. Spleen is not enlarged. No significant abnormalities are noted of the pancreas adrenal glands, or upper renal poles. Bony structures appear to be intact.       Impression:  1. No evidence of pulmonary embolic disease. 2. Small, approximately 5 cm area of the anterior left upper lobe, with micronodular or early alveolar disease. With this appearance, either a small focus of lobar pneumonia, or very focal granulomatous infection could be present. 3. No evidence of active chest disease elsewhere.  This report was finalized on 7/23/2022 5:04 PM by Dr. Genaro Sheffield MD.        Results for orders placed during the hospital encounter of 03/14/22    Adult Transthoracic Echo Complete W/ Cont if Necessary Per Protocol    Interpretation Summary  · Left ventricular ejection fraction appears to be 66 - 70%. Left ventricular systolic function is normal.  · Left ventricular diastolic function was normal.  · No significant structural or functional valvular disease.      I have reviewed the medications:  Scheduled Meds:apixaban, 5 mg, Oral, Q12H  aspirin, 81 mg, Oral,  Daily  atorvastatin, 40 mg, Oral, Nightly  cholestyramine light, 2 packet, Oral, Q12H  insulin lispro, 0-7 Units, Subcutaneous, TID AC  levothyroxine, 200 mcg, Oral, Q AM  levothyroxine, 25 mcg, Oral, Q AM  metoprolol tartrate, 25 mg, Oral, BID  multivitamin with minerals, 1 tablet, Oral, Daily  NIFEdipine XL, 60 mg, Oral, Daily  nitroglycerin, 1 inch, Topical, Q6H  sodium chloride, 10 mL, Intravenous, Q12H  valsartan-HCTZ 80-12.5 combo dose, , Oral, Daily      Continuous Infusions:   PRN Meds:.•  acetaminophen  •  dextrose  •  dextrose  •  docusate sodium  •  glucagon (human recombinant)  •  melatonin  •  potassium chloride **OR** potassium chloride **OR** potassium chloride  •  senna-docusate sodium  •  sodium chloride  •  sodium chloride    Assessment & Plan   Assessment & Plan     Active Hospital Problems    Diagnosis  POA   • Chest pain, unspecified type [R07.9]  Yes   • Syncope and collapse [R55]  Yes   • Hypokalemia [E87.6]  Yes   • Pituitary macroadenoma (HCC) [D35.2]  Unknown   • Anxiety [F41.9]  Yes   • PCOS (polycystic ovarian syndrome) [E28.2]  Yes   • DVT (deep venous thrombosis) (HCC) [I82.409]  Yes   • Hypertension [I10]  Yes   • Hypothyroidism [E03.9]  Yes   • Sleep apnea [G47.30]  Yes   • Coronary artery disease of native artery of native heart with stable angina pectoris (HCC) [I25.118]  Yes      Resolved Hospital Problems   No resolved problems to display.        Brief Hospital Course to date:  Jazmín Hicks is a 52 y.o. female with pmh significant for HTN, HLP, PCOS/ DM, CAD, Syncope, anxiety/ depression, PE/ DVT on chronic eliquis therapy, post-partem Hashimoto's hypothyroidism presented for several days of midsternal chest pain and multiple syncopal events.    Recurrent syncope  - orthostatics are negative  - appreciate NS evaluation, they do not feel pituitary mass related to syncope events  - nearly homebound (has not left house since April) making work up necessary on inpatient  basis  - consult Cardiology for evaluation of CP and syncope since high risk for need of LHC stratification especially given potential surgery if indicated for macroadenoma as outpatient    New dx pituitary macroadenoma  - not source of syncope per NS  - FSH, TSH, ACTH ordered and pending  - will need close outpt f/u with Dr. Styles in clinic for formal testing and surveillence    CAD  - last cath 2019 (data deficit)    PCOS with glucose intolerance/pre-DM  - on metformin and aldactone as outpatient  - a1c = 6.4%  - SSI while here    Hx PE/DVT  - on chronic Eliquis    Post-gestation Hashimoto's hx with chronic hypothyroidism  - on Synthroid replacement with normal TSH     Other issues stable and/or chronic    Expected Discharge Location and Transportation: home via car  Expected Discharge Date: 7/25/22    DVT prophylaxis:  Medical DVT prophylaxis orders are present.     AM-PAC 6 Clicks Score (PT): 24 (07/24/22 0800)    CODE STATUS:   Code Status and Medical Interventions:   Ordered at: 07/23/22 1722     Level Of Support Discussed With:    Patient     Code Status (Patient has no pulse and is not breathing):    CPR (Attempt to Resuscitate)     Medical Interventions (Patient has pulse or is breathing):    Full Support       Dede Pruett MD  07/24/22

## 2022-07-24 NOTE — CONSULTS
NEUROSURGERY CONSULTATION    Referring Provider: No ref. provider found    Patient Care Team:  Isabel Hidalgo APRN as PCP - General (Family Medicine)  Brook Green MD as Consulting Physician (Obstetrics and Gynecology)  Pratik White MD as Consulting Physician (Cardiology)    Chief Complaint: Syncope    History of Present Illness: This is a 52-year-old woman with a PMH significant for DVT/PE (on Eliquis), CAD, PCOS, Hashimoto's disease, chronic migraines who presented to [facility] ED yesterday with complaints of chest pain and syncope.  Patient notes a longstanding history of syncopal episodes with around 4-5 syncopal events over the last few days.  Patient states that she has a lot of mobility issues and typically does not leave her home.  Until presenting to our facility she had not been out of her house prior to mid April.  CT head upon arrival was negative for stroke or other acute process.  There was noted to be an irregularity at the base of the skull therefore MRI brain was obtained which demonstrated a pituitary macroadenoma.  As such neurosurgical consultation was requested.      Review of Systems:  Musculoskeletal and Neurological systems were reviewed and are negative except for:  Musculoskeletal: positive for muscle weakness  Neurological: positive for weakness    History:  Past Medical History:   Diagnosis Date   • Abdominal pain    • Anemia    • Anxiety    • Arthritis    • Back pain    • Bronchitis    • Cardiovascular disease    • Cataract    • Coronary artery disease    • Depression    • Diarrhea     chronic- since kid   • Diverticulitis    • DVT (deep venous thrombosis) (HCC)     left lower calf   • Eczema     stress induced   • Gall stones    • Hashimoto's disease    • Heart attack (HCC)     x5   • History of transfusion     no reaction to blood; age 16   • Hyperlipidemia    • Hypertension    • Hypothyroidism    • Kidney stone    • Lyme disease    • Migraine headache    • OCD (obsessive  compulsive disorder)    • Panic attack    • PCOS (polycystic ovarian syndrome)     takes metformin   • Pituitary macroadenoma (HCC) 7/23/2022   • PNA (pneumonia)    • Seasonal allergies    • Sleep apnea     cpap compliant   • Wears glasses    ,   Past Surgical History:   Procedure Laterality Date   • BILATERAL BREAST REDUCTION     • CARDIAC CATHETERIZATION  10/2019   • CHOLECYSTECTOMY     • COLONOSCOPY N/A 8/25/2021    Procedure: Colonoscopy with polypectomy;  Surgeon: Lamont Rm MD;  Location[facility] ENDOSCOPY;  Service: Gastroenterology;  Laterality: N/A;   • KNEE SURGERY Right     scopy   • TONSILLECTOMY     ,   Family History   Problem Relation Age of Onset   • Cancer Mother         multiple myoloma    • Hypertension Mother    • Thyroid disease Mother    • Hypothyroidism Mother    • Colon polyps Mother    • Heart disease Father    • Hypertension Father    • Deep vein thrombosis Father    • Hypothyroidism Father    • Heart attack Father    • Hypothyroidism Sister    • Hypertension Sister    • Heart disease Sister    • Cancer Sister         colorectal   • Colon polyps Sister    • Heart attack Sister    • Hypothyroidism Brother    • Hypertension Brother    • Heart disease Brother    • Colon polyps Brother    • Heart attack Brother    • Hypothyroidism Daughter    • Scoliosis Daughter    • Depression Daughter    • Hypertension Maternal Grandmother    • Cancer Maternal Grandmother         2 rounds of breast   • No Known Problems Maternal Grandfather    • Diabetes Paternal Grandmother    • Stroke Paternal Grandmother    • Hypertension Paternal Grandmother    • Heart disease Paternal Grandfather    • Heart attack Paternal Grandfather    • Hypothyroidism Sister    • Hypertension Sister    • Colon polyps Sister    • Hypothyroidism Brother    • Hypertension Brother    • Heart disease Brother    • Colon polyps Brother    • Heart attack Brother    • Hypothyroidism Daughter    • Anxiety disorder Daughter    •  "Depression Daughter    ,   Social History     Tobacco Use   • Smoking status: Former Smoker     Packs/day: 0.75     Years: 20.00     Pack years: 15.00     Types: Cigarettes     Quit date: 2017     Years since quittin.5   • Smokeless tobacco: Never Used   Vaping Use   • Vaping Use: Never used   Substance Use Topics   • Alcohol use: Yes     Comment: wine 3x month   • Drug use: Never   ,   Medications Prior to Admission   Medication Sig Dispense Refill Last Dose   • atorvastatin (LIPITOR) 40 MG tablet TAKE ONE TABLET BY MOUTH ONCE NIGHTLY 30 tablet 5 2022 at 2100   • Eliquis 5 MG tablet tablet TAKE ONE TABLET BY MOUTH EVERY 12 HOURS 60 tablet 0 2022 at [id number] time   • levothyroxine (SYNTHROID, LEVOTHROID) 200 MCG tablet TAKE ONE TABLET BY MOUTH DAILY WITH 25 MCG TABLET 90 tablet 0 2022 at 0830   • metFORMIN (GLUCOPHAGE) 500 MG tablet TAKE ONE TABLET BY MOUTH DAILY WITH BREAKFAST 90 tablet 5 2022 at 0830   • metoprolol tartrate (LOPRESSOR) 25 MG tablet TAKE ONE TABLET BY MOUTH TWICE A DAY 60 tablet 11 2022 at 0830   • NIFEdipine CC (ADALAT CC) 60 MG 24 hr tablet Take 1 tablet by mouth Daily. 30 tablet 5 2022 at 0830   • valsartan-hydrochlorothiazide (DIOVAN-HCT) 80-12.5 MG per tablet Take 1 tablet by mouth Daily. 30 tablet 5 2022 at 0830   • levothyroxine (SYNTHROID, LEVOTHROID) 25 MCG tablet TAKE ONE TABLET BY MOUTH DAILY 90 tablet 0    • nitroglycerin (NITROLINGUAL) 0.4 MG/SPRAY spray Place 1 spray under the tongue Every 5 (Five) Minutes As Needed for Chest Pain.       and Allergies:  Capsaicin, Adhesive tape, Darvon [propoxyphene], and Lisinopril      Physical Exam:  Vital Signs: Blood pressure (!) 141/109, pulse 94, temperature 98.4 °F (36.9 °C), temperature source Oral, resp. rate 18, height 165.1 cm (65\"), weight (!) 159 kg (350 lb 8.5 oz), SpO2 98 %, not currently breastfeeding.  Physical Exam  Constitutional:       Appearance: Normal appearance. She is obese.   HENT: "      Head: Normocephalic and atraumatic.   Musculoskeletal:         General: Normal range of motion.      Cervical back: Normal range of motion and neck supple.   Skin:     General: Skin is warm and dry.   Neurological:      Mental Status: She is alert and oriented to person, place, and time.      GCS: GCS eye subscore is 4. GCS verbal subscore is 5. GCS motor subscore is 6.      Sensory: Sensation is intact.      Motor: Motor function is intact.      Coordination: Coordination is intact.      Comments: CRANIAL NERVES:  Cranial nerve II:  Left peripheral visual deficit is noted in all areas of testing. Right side WNL.  Cranial nerves III, IV and VI:  PERRLADC.  Extraocular movements are intact.  Nystagmus is not present.  Cranial nerve V:  Facial sensation is intact to light touch.  Cranial nerve VII:  Muscles of facial expression reveal no asymmetry.  Cranial nerve VIII:  Hearing is intact to finger rub bilaterally.  Cranial nerves IX and X:  Palate elevates symmetrically.  Cranial nerve XI:  Shoulder shrug is intact.  Cranial nerve XII:  Tongue is midline without evidence of atrophy or fasciculation.   Psychiatric:         Mood and Affect: Mood normal.         Behavior: Behavior normal.         Data Review:  All imaging performed during this hospitalization reviewed with Dr. Styles    Diagnosis:  1.  Pituitary macroadenoma  2.  Morbid obesity  3.  Syncope    Treatment Recommendations:  This is a 52-year-old woman who was admitted to our facility for multiple recent syncopal episodes who was noted to have an incidental finding of a pituitary macroadenoma.  Held a long discussion with the patient regarding the nature of a pituitary macroadenoma.  This is likely not the cause of her current or past history of syncopal episodes.  She may benefit from reevaluation with cardiology regarding this.  She will need endocrine labs which have been ordered.  Once she is medically stabilized we will have her follow-up with  Dr. Styles's clinic for further discussion and arrangement of formal visual field testing.  Please call with any additional questions.      Radha Suero PA-C  07/24/22  12:40 EDT

## 2022-07-24 NOTE — PLAN OF CARE
Goal Outcome Evaluation:  Plan of Care Reviewed With: patient        Progress: no change  Outcome Evaluation: Pt on ra. pain managed per mar. No voiced complaints at this time. will continue to monitor.

## 2022-07-25 ENCOUNTER — APPOINTMENT (OUTPATIENT)
Dept: CARDIOLOGY | Facility: HOSPITAL | Age: 52
End: 2022-07-25

## 2022-07-25 ENCOUNTER — READMISSION MANAGEMENT (OUTPATIENT)
Dept: CALL CENTER | Facility: HOSPITAL | Age: 52
End: 2022-07-25

## 2022-07-25 VITALS
RESPIRATION RATE: 18 BRPM | TEMPERATURE: 98.1 F | SYSTOLIC BLOOD PRESSURE: 142 MMHG | OXYGEN SATURATION: 96 % | WEIGHT: 293 LBS | BODY MASS INDEX: 48.82 KG/M2 | DIASTOLIC BLOOD PRESSURE: 81 MMHG | HEART RATE: 103 BPM | HEIGHT: 65 IN

## 2022-07-25 LAB
GLUCOSE BLDC GLUCOMTR-MCNC: 106 MG/DL (ref 70–130)
GLUCOSE BLDC GLUCOMTR-MCNC: 123 MG/DL (ref 70–130)
GLUCOSE BLDC GLUCOMTR-MCNC: 141 MG/DL (ref 70–130)

## 2022-07-25 PROCEDURE — 97535 SELF CARE MNGMENT TRAINING: CPT

## 2022-07-25 PROCEDURE — G0378 HOSPITAL OBSERVATION PER HR: HCPCS

## 2022-07-25 PROCEDURE — 25010000002 REGADENOSON 0.4 MG/5ML SOLUTION: Performed by: INTERNAL MEDICINE

## 2022-07-25 PROCEDURE — 94799 UNLISTED PULMONARY SVC/PX: CPT

## 2022-07-25 PROCEDURE — 97161 PT EVAL LOW COMPLEX 20 MIN: CPT

## 2022-07-25 PROCEDURE — 94660 CPAP INITIATION&MGMT: CPT

## 2022-07-25 PROCEDURE — 78431 MYOCRD IMG PET RST&STRS CT: CPT | Performed by: INTERNAL MEDICINE

## 2022-07-25 PROCEDURE — 97165 OT EVAL LOW COMPLEX 30 MIN: CPT

## 2022-07-25 PROCEDURE — 0 RUBIDIUM CHLORIDE: Performed by: INTERNAL MEDICINE

## 2022-07-25 PROCEDURE — A9555 RB82 RUBIDIUM: HCPCS | Performed by: INTERNAL MEDICINE

## 2022-07-25 PROCEDURE — 82962 GLUCOSE BLOOD TEST: CPT

## 2022-07-25 PROCEDURE — 99217 PR OBSERVATION CARE DISCHARGE MANAGEMENT: CPT | Performed by: INTERNAL MEDICINE

## 2022-07-25 PROCEDURE — 78431 MYOCRD IMG PET RST&STRS CT: CPT

## 2022-07-25 PROCEDURE — 93017 CV STRESS TEST TRACING ONLY: CPT

## 2022-07-25 PROCEDURE — 99214 OFFICE O/P EST MOD 30 MIN: CPT | Performed by: INTERNAL MEDICINE

## 2022-07-25 PROCEDURE — 93018 CV STRESS TEST I&R ONLY: CPT | Performed by: INTERNAL MEDICINE

## 2022-07-25 RX ORDER — MULTIPLE VITAMINS W/ MINERALS TAB 9MG-400MCG
1 TAB ORAL DAILY
Start: 2022-07-25

## 2022-07-25 RX ORDER — ASPIRIN 81 MG/1
81 TABLET ORAL DAILY
Start: 2022-07-25

## 2022-07-25 RX ORDER — MONTELUKAST SODIUM 4 MG/1
2 TABLET, CHEWABLE ORAL 2 TIMES DAILY
Qty: 120 TABLET | Refills: 5
Start: 2022-07-25

## 2022-07-25 RX ADMIN — LEVOTHYROXINE SODIUM 200 MCG: 0.1 TABLET ORAL at 05:45

## 2022-07-25 RX ADMIN — MULTIPLE VITAMINS W/ MINERALS TAB 1 TABLET: TAB at 09:37

## 2022-07-25 RX ADMIN — Medication 10 ML: at 09:38

## 2022-07-25 RX ADMIN — HYDROCHLOROTHIAZIDE: 12.5 CAPSULE ORAL at 09:37

## 2022-07-25 RX ADMIN — RUBIDIUM CHLORIDE RB-82 1 DOSE: 150 INJECTION, SOLUTION INTRAVENOUS at 08:45

## 2022-07-25 RX ADMIN — REGADENOSON 0.4 MG: 0.08 INJECTION, SOLUTION INTRAVENOUS at 08:41

## 2022-07-25 RX ADMIN — METOPROLOL TARTRATE 25 MG: 25 TABLET, FILM COATED ORAL at 09:37

## 2022-07-25 RX ADMIN — LEVOTHYROXINE SODIUM 25 MCG: 25 TABLET ORAL at 05:46

## 2022-07-25 RX ADMIN — NIFEDIPINE 60 MG: 60 TABLET, EXTENDED RELEASE ORAL at 09:37

## 2022-07-25 RX ADMIN — RUBIDIUM CHLORIDE RB-82 1 DOSE: 150 INJECTION, SOLUTION INTRAVENOUS at 08:34

## 2022-07-25 RX ADMIN — APIXABAN 5 MG: 5 TABLET, FILM COATED ORAL at 09:37

## 2022-07-25 NOTE — THERAPY DISCHARGE NOTE
Acute Care - Occupational Therapy Discharge  [facility]    Patient Name: Jazmín Hicks  : 1970    MRN: [id number]                              Today's Date: 2022       Admit Date: 2022    Visit Dx:     ICD-10-CM ICD-9-CM   1. Chest pain, unspecified type  R07.9 786.50   2. Syncope, unspecified syncope type  R55 780.2     Patient Active Problem List   Diagnosis   • Morbidly obese (HCC)   • Hypertension   • Hypothyroidism   • DVT (deep venous thrombosis) (HCC)   • Sleep apnea   • Coronary artery disease of native artery of native heart with stable angina pectoris (HCC)   • PCOS (polycystic ovarian syndrome)   • Anxiety   • Depression   • OCD (obsessive compulsive disorder)   • Diverticulosis   • Family history of colon cancer   • History of DVT of lower extremity   • Gastroenteritis   • Screen for colon cancer   • Chest pain, unspecified type   • Syncope and collapse   • Hypokalemia   • Pituitary macroadenoma (HCC)     Past Medical History:   Diagnosis Date   • Abdominal pain    • Anemia    • Anxiety    • Arthritis    • Back pain    • Bronchitis    • Cardiovascular disease    • Cataract    • Coronary artery disease    • Depression    • Diarrhea     chronic- since kid   • Diverticulitis    • DVT (deep venous thrombosis) (HCC)     left lower calf   • Eczema     stress induced   • Gall stones    • Hashimoto's disease    • Heart attack (HCC)     x5   • History of transfusion     no reaction to blood; age 16   • Hyperlipidemia    • Hypertension    • Hypothyroidism    • Kidney stone    • Lyme disease    • Migraine headache    • OCD (obsessive compulsive disorder)    • Panic attack    • PCOS (polycystic ovarian syndrome)     takes metformin   • Pituitary macroadenoma (HCC) 2022   • PNA (pneumonia)    • Seasonal allergies    • Sleep apnea     cpap compliant   • Wears glasses      Past Surgical History:   Procedure Laterality Date   • BILATERAL BREAST REDUCTION     • CARDIAC CATHETERIZATION   10/2019   • CHOLECYSTECTOMY     • COLONOSCOPY N/A 8/25/2021    Procedure: Colonoscopy with polypectomy;  Surgeon: Lamont Rm MD;  Location: [facility] ENDOSCOPY;  Service: Gastroenterology;  Laterality: N/A;   • KNEE SURGERY Right     scopy   • TONSILLECTOMY        General Information     Row Name 07/25/22 0735          OT Time and Intention    Document Type discharge evaluation/summary  -     Mode of Treatment occupational therapy  -AC     Row Name 07/25/22 0735          General Information    Patient Profile Reviewed yes  -AC     Prior Level of Function independent:;all household mobility;ADL's;driving;work  has a SPC, but does not use all the time  -     Existing Precautions/Restrictions --  syncope  -     Barriers to Rehab none identified  -     Row Name 07/25/22 0735          Living Environment    People in Home child(cory), adult  pt's cousin and 10 yo son  -     Row Name 07/25/22 0735          Home Main Entrance    Number of Stairs, Main Entrance ten  -AC     Stair Railings, Main Entrance railing on left side (ascending)  -AC     Row Name 07/25/22 0735          Stairs Within Home, Primary    Stairs, Within Home, Primary 1 story apt  -AC     Row Name 07/25/22 0735          Cognition    Orientation Status (Cognition) oriented x 4  -AC     Row Name 07/25/22 0735          Safety Issues, Functional Mobility    Impairments Affecting Function (Mobility) other (see comments)  limited distal reach  -           User Key  (r) = Recorded By, (t) = Taken By, (c) = Cosigned By    Initials Name Provider Type    AC Joyce Olmstead OT Occupational Therapist               Mobility/ADL's     Row Name 07/25/22 0913          Bed Mobility    Bed Mobility supine-sit  -AC     Supine-Sit [place] (Bed Mobility) modified independence  -     Assistive Device (Bed Mobility) bed rails  -     Row Name 07/25/22 0913          Transfers    Transfers sit-stand transfer  -     Sit-Stand [place] (Transfers)  independent  -[facility] Name 07/25/22 0913          Functional Mobility    Functional Mobility- Ind. Level independent  -     Functional Mobility-Distance (Feet) 30  -[facility] Name 07/25/22 0913          Activities of Daily Living    BADL Assessment/Intervention lower body dressing;feeding;grooming  -[facility] Name 07/25/22 0913          Lower Body Dressing Assessment/Training    [place] Level (Lower Body Dressing) don;doff;socks;verbal cues;modified independence  -     Assistive Devices (Lower Body Dressing) reacher;sock-aid  -     Position (Lower Body Dressing) unsupported sitting  -     Comment, (Lower Body Dressing) issued AE for LBD d/t difficulty with distal reach  -[facility] Name 07/25/22 0913          Self-Feeding Assessment/Training    [place] Level (Feeding) feeding skills;independent  -AC     Row Name 07/25/22 0913          Grooming Assessment/Training    [place] Level (Grooming) hair care, combing/brushing;independent  -     Position (Grooming) unsupported sitting  -AC           User Key  (r) = Recorded By, (t) = Taken By, (c) = Cosigned By    Initials Name Provider Type     Joyce Olmstead, OT Occupational Therapist               Obj/Interventions     [facility] Name 07/25/22 0735          Sensory Assessment (Somatosensory)    Sensory Subjective Reports tingling;numbness  B hands at times  -     Sensory Assessment pt reporting numbness in B small fingers and reports dropping objects - educated in sensory activities and comp strategies and pt verbalized understanding  -[facility] Name 07/25/22 0735          Vision Assessment/Intervention    Visual Impairment/Limitations corrective lenses full-time  -[facility] Name 07/25/22 0735          Range of Motion Comprehensive    General Range of Motion bilateral upper extremity ROM WNL  -[facility] Name 07/25/22 0735          Strength Comprehensive (MMT)    Comment, General Manual Muscle Testing (MMT) Assessment BUE grossly 5/5, hand   4/5  -[facility] Name 07/25/22 0735          Motor Skills    Therapeutic Exercise hand  issued blue sponges and reviewed HEP for hand strengthening as pt reporting weak   -           User Key  (r) = Recorded By, (t) = Taken By, (c) = Cosigned By    Initials Name Provider Type    AC Joyce Olmstead, OT Occupational Therapist               Goals/Plan    No documentation.                Clinical Impression     [facility] Name 07/25/22 0916          Pain Assessment    Pretreatment Pain Rating 4/10  -AC     Posttreatment Pain Rating 4/10  -AC     Pain Location - Side/Orientation Right  -     Pain Location - knee  -AC     Pre/Posttreatment Pain Comment 5/10 neck pre/post  -AC     Pain Intervention(s) Repositioned;Ambulation/increased activity  -     Row Name 07/25/22 0916          Plan of Care Review    Plan of Care Reviewed With patient  -     Outcome Evaluation OT sarmad complete.  Pt issued and educated in use of  AE for LBD d/t limited distal reach.  Pt mod ind to don /doff socks with sock aid and reacher. Pt reported weak , decreased sensation in hands and dropping objects - pt educated in hand strengthening, sensation HEP and comp strategies wtih pt verbalizing understanding.   Pt independent grooming, ind self feeding.  Pt ambulated in room independently without AD. No deficits which would require further OT intervention at this time.  Recommend home upon d/c.  -[facility] Name 07/25/22 0916          Therapy Assessment/Plan (OT)    Criteria for Skilled Therapeutic Interventions Met (OT) no;no problems identified which require skilled intervention  -     Row Name 07/25/22 0916          Therapy Plan Review/Discharge Plan (OT)    Anticipated Discharge Disposition (OT) home  -[facility] Name 07/25/22 0916          Vital Signs    Pre Systolic BP Rehab 119  -AC     Pre Treatment Diastolic BP 64  -AC     Post Systolic BP Rehab 121  -AC     Post Treatment Diastolic BP 88  -AC     Pretreatment Heart Rate  (beats/min) 64  -AC     Posttreatment Heart Rate (beats/min) 95  -AC     Pre SpO2 (%) 93  -AC     O2 Delivery Pre Treatment room air  -AC     O2 Delivery Intra Treatment room air  -AC     Post SpO2 (%) 98  -AC     O2 Delivery Post Treatment room air  -AC     Pre Patient Position Supine  -AC     Intra Patient Position Standing  -AC     Post Patient Position Sitting  -AC     Row Name 07/25/22 0916          Positioning and Restraints    Pre-Treatment Position in bed  -AC     Post Treatment Position chair  -AC     In Chair notified nsg;sitting;call light within reach  -AC           User Key  (r) = Recorded By, (t) = Taken By, (c) = Cosigned By    Initials Name Provider Type    Joyce Herr, OT Occupational Therapist               Outcome Measures     Row Name 07/25/22 0922          How much help from another is currently needed...    Putting on and taking off regular lower body clothing? 4  with use of AE  -AC     Bathing (including washing, rinsing, and drying) 4  -AC     Toileting (which includes using toilet bed pan or urinal) 4  -AC     Putting on and taking off regular upper body clothing 4  -AC     Taking care of personal grooming (such as brushing teeth) 4  -AC     Eating meals 4  -AC     AM-PAC 6 Clicks Score (OT) 24  -AC     Row Name 07/25/22 1204 07/25/22 0800       How much help from another person do you currently need...    Turning from your back to your side while in flat bed without using bedrails? 4  -AE 4  -MG    Moving from lying on back to sitting on the side of a flat bed without bedrails? 4  -AE 4  -MG    Moving to and from a bed to a chair (including a wheelchair)? 4  -AE 4  -MG    Standing up from a chair using your arms (e.g., wheelchair, bedside chair)? 4  -AE 4  -MG    Climbing 3-5 steps with a railing? 4  -AE 4  -MG    To walk in hospital room? 4  -AE 4  -MG    AM-PAC 6 Clicks Score (PT) 24  -AE 24  -MG    Highest level of mobility 8 --> Walked 250 feet or more  -AE 8 --> Walked 250 feet  or more  -MG    Row Name 07/25/22 1204 07/25/22 0922       Functional Assessment    Outcome Measure Options AM-PAC 6 Clicks Basic Mobility (PT)  -AE AM-PAC 6 Clicks Daily Activity (OT)  -          User Key  (r) = Recorded By, (t) = Taken By, (c) = Cosigned By    Initials Name Provider Type    AC Joyce Olmstead, OT Occupational Therapist    Caity Barrett, RN Registered Nurse    David Alvarenga, PT Physical Therapist              Occupational Therapy Education                 Title: PT OT SLP Therapies (In Progress)     Topic: Occupational Therapy (In Progress)     Point: ADL training (Done)     Description:   Instruct learner(s) on proper safety adaptation and remediation techniques during self care or transfers.   Instruct in proper use of assistive devices.              Learning Progress Summary           Patient Acceptance, E,TB,D, VU,DU by  at 7/25/2022 0923    Comment: Role of OT, benefits of activity, use of AE for LBD    Acceptance, E,TB,D, VU,DU by  at 7/25/2022 0923    Comment: Use of AE for LBD.  Pt reported weak , decreased sensation in hands and dropping objects - pt educated in hand strengthening, sensation HEP and comp strategies                   Point: Home exercise program (Not Started)     Description:   Instruct learner(s) on appropriate technique for monitoring, assisting and/or progressing therapeutic exercises/activities.              Learner Progress:  Not documented in this visit.          Point: Precautions (Not Started)     Description:   Instruct learner(s) on prescribed precautions during self-care and functional transfers.              Learner Progress:  Not documented in this visit.          Point: Body mechanics (Not Started)     Description:   Instruct learner(s) on proper positioning and spine alignment during self-care, functional mobility activities and/or exercises.              Learner Progress:  Not documented in this visit.                      User Key      Initials Effective Dates Name Provider Type Discipline     06/16/21 -  Joyce Olmstead, OT Occupational Therapist OT              OT Recommendation and Plan     Plan of Care Review  Plan of Care Reviewed With: patient  Outcome Evaluation: OT eval complete.  Pt issued and educated in use of  AE for LBD d/t limited distal reach.  Pt mod ind to don /doff socks with sock aid and reacher. Pt reported weak , decreased sensation in hands and dropping objects - pt educated in hand strengthening, sensation HEP and comp strategies wtih pt verbalizing understanding.   Pt independent grooming, ind self feeding.  Pt ambulated in room independently without AD. No deficits which would require further OT intervention at this time.  Recommend home upon d/c.  Plan of Care Reviewed With: patient  Outcome Evaluation: OT eval complete.  Pt issued and educated in use of  AE for LBD d/t limited distal reach.  Pt mod ind to don /doff socks with sock aid and reacher. Pt reported weak , decreased sensation in hands and dropping objects - pt educated in hand strengthening, sensation HEP and comp strategies wtih pt verbalizing understanding.   Pt independent grooming, ind self feeding.  Pt ambulated in room independently without AD. No deficits which would require further OT intervention at this time.  Recommend home upon d/c.     Time Calculation:    Time Calculation- OT     Row Name 07/25/22 0735             Time Calculation- OT    OT Start Time 0735  -AC      OT Received On 07/25/22  -AC              Timed Charges    17579 - OT Self Care/Mgmt Minutes 10  -AC              Untimed Charges    OT Eval/Re-eval Minutes 45  -AC              Total Minutes    Timed Charges Total Minutes 10  -AC      Untimed Charges Total Minutes 45  -AC       Total Minutes 55  -AC            User Key  (r) = Recorded By, (t) = Taken By, (c) = Cosigned By    Initials Name Provider Type    AC Joyce Olmstead, OT Occupational Therapist              Therapy  Charges for Today     Code Description Service Date Service Provider Modifiers Qty    [id number]  OT SELF CARE/MGMT/TRAIN EA 15 MIN 7/25/2022 Joyce Olmstead, OT GO 1    [id number]  OT EVAL LOW COMPLEXITY 3 7/25/2022 Joyce Olmstead, OT GO 1             OT Discharge Summary  Anticipated Discharge Disposition (OT): home    Joyce Olmstead OT  7/25/2022

## 2022-07-25 NOTE — PROGRESS NOTES
"  [facility]  PROGRESS NOTE    Date of Admission: 7/23/2022  Date of Service: 07/25/22    Primary Care Physician: Isabel Hidalgo APRN    Chief Complaint: f/u chest pain, syncope   Problem List:   Hypertension    Hypothyroidism    DVT (deep venous thrombosis) (HCC)    Sleep apnea    Coronary artery disease of native artery of native heart with stable angina pectoris (HCC)    PCOS (polycystic ovarian syndrome)    Anxiety    Chest pain, unspecified type    Syncope and collapse    Hypokalemia    Pituitary macroadenoma (HCC)      Subjective      Underwent stress MPS this AM with no complaints. Sitting in chair, denies chest pain or recurrent syncope/pre-syncope.       Objective   Vitals: /64 (BP Location: Left arm, Patient Position: Lying)   Pulse 81   Temp 98.2 °F (36.8 °C) (Oral)   Resp 18   Ht 165.1 cm (65\")   Wt (!) 159 kg (350 lb 8.5 oz)   SpO2 97%   BMI 58.33 kg/m²     Physical Exam:  GENERAL: Alert, cooperative, in no acute distress.   HEENT: Normocephalic, no jugular venous distention  HEART: Regular rhythm, normal rate, and no murmurs, gallops, or rubs.   LUNGS: Clear to auscultation bilaterally. No wheezing, rales or rhonchi.  ABDOMEN: Soft, bowel sounds present, nontender   NEUROLOGIC: No focal abnormalities involving strength or sensation are noted.   EXTREMITIES: No clubbing, cyanosis, or edema noted.     Results:  Results from last 7 days   Lab Units 07/23/22  1402   WBC 10*3/mm3 8.30   HEMOGLOBIN g/dL 13.3   HEMATOCRIT % 41.1   PLATELETS 10*3/mm3 317     Results from last 7 days   Lab Units 07/23/22  1402   SODIUM mmol/L 139   POTASSIUM mmol/L 3.3*   CHLORIDE mmol/L 103   CO2 mmol/L 26.0   BUN mg/dL 11   CREATININE mg/dL 0.99   GLUCOSE mg/dL 148*      Lab Results   Component Value Date    CHOL 152 07/24/2022    TRIG 126 07/24/2022    HDL 32 (L) 07/24/2022    LDL 97 07/24/2022    AST 35 (H) 07/23/2022    ALT 35 (H) 07/23/2022     Results from last 7 days "   Lab Units 07/23/22  1402   HEMOGLOBIN A1C % 6.40*     Results from last 7 days   Lab Units 07/24/22  0351   CHOLESTEROL mg/dL 152   TRIGLYCERIDES mg/dL 126   HDL CHOL mg/dL 32*   LDL CHOL mg/dL 97     Results from last 7 days   Lab Units 07/23/22  1740   TSH uIU/mL 1.990             Results from last 7 days   Lab Units 07/24/22  0000 07/23/22  1740 07/23/22  1402   TROPONIN T ng/mL <0.010 <0.010 <0.010     Results from last 7 days   Lab Units 07/23/22  1402   PROBNP pg/mL 100.5       Intake/Output Summary (Last 24 hours) at 7/25/2022 0831  Last data filed at 7/24/2022 1700  Gross per 24 hour   Intake 960 ml   Output --   Net 960 ml       I personally reviewed the patient's EKG/Telemetry data    Radiology Data:   Echo 7/24/22:  Interpretation Summary    · Left ventricular ejection fraction appears to be 61 - 65%. Left ventricular systolic function is normal.  · Left ventricular wall thickness is consistent with mild concentric hypertrophy.  · Left ventricular diastolic function was normal.  · Estimated right ventricular systolic pressure from tricuspid regurgitation is normal (<35 mmHg). Calculated right ventricular systolic pressure from tricuspid regurgitation is 21 mmHg.        Current Medications:  apixaban, 5 mg, Oral, Q12H  aspirin, 81 mg, Oral, Daily  atorvastatin, 40 mg, Oral, Nightly  cholestyramine light, 2 packet, Oral, Q12H  insulin lispro, 0-7 Units, Subcutaneous, TID AC  levothyroxine, 200 mcg, Oral, Q AM  levothyroxine, 25 mcg, Oral, Q AM  metoprolol tartrate, 25 mg, Oral, BID  multivitamin with minerals, 1 tablet, Oral, Daily  NIFEdipine XL, 60 mg, Oral, Daily  nitroglycerin, 1 inch, Topical, Q6H  regadenoson, 0.4 mg, Intravenous, Once  sodium chloride, 10 mL, Intravenous, Q12H  valsartan-HCTZ 80-12.5 combo dose, , Oral, Daily           Assessment and Plan:     1.  CAD  -Known hx of CAD with Possible previous embolic MI (deficient data). Worsening CP over the last week with radiation to  "back/shoulder blades. Relieved by Ntg. Troponins negative. No ischemic changes on EKG..   -Echo, 7/24/22, EF 61-65%, Mild LVH  -Continue Eliquis 5 mg twice daily  -Continue  metoprolol 25 mg twice daily, Statin, ASA   - stress PET today for risk stratification - if low risk, she could be discharged from cardiac perspective with MCOT in place      2. Syncope  -orthostatics negative  -Newly dx pituitary adenoma, not felt to be factor in syncope.   -Normal Echo this admission  -30 day holter at discharge.   -increase fluids, change positions slowly     3.  Palpitations:  -Continue BB, 30 day holter at discharge.      4.  New dx pituitary macroadenoma  - not source of syncope per NS  - f/u with Dr. Styles outpatient for formal testing and surveillence    Electronically signed by Ana Leone PA-C, 07/25/22, 8:32 AM EDT.    ___________________________________________________________  The patient was seen and examined by me.  Agree and verified/discussed key components of E/M as outlined by the Nurse practitioner/PA.    /81 (BP Location: Left arm, Patient Position: Sitting)   Pulse 89   Temp 98.5 °F (36.9 °C) (Oral)   Resp 18   Ht 165.1 cm (65\")   Wt (!) 159 kg (350 lb 8.5 oz)   SpO2 97%   BMI 58.33 kg/m²     General Appearance:   · well developed  · well nourished  Neck:  · thyroid not enlarged  · supple  Respiratory:  · no respiratory distress  · normal breath sounds  · no rales  Cardiovascular:  · no jugular venous distention  · regular rhythm  · apical impulse normal  · S1 normal, S2 normal  · no S3, no S4   · no murmur  · no rub, no thrill  · carotid pulses normal; no bruit  · pedal pulses normal  · lower extremity edema: none  .   Skin:   · warm, dry        Plan:    Admitted for chest pain, echo benign  Stress/PET today, if low risk okay for discharge from cardiac standpoint  30-day monitor discharge for syncope.    Pratik White MD, [facility]     "

## 2022-07-25 NOTE — THERAPY DISCHARGE NOTE
Patient Name: Jazmín Hicks  : 1970    MRN: [id number]                              Today's Date: 2022       Admit Date: 2022    Visit Dx:     ICD-10-CM ICD-9-CM   1. Chest pain, unspecified type  R07.9 786.50   2. Syncope, unspecified syncope type  R55 780.2     Patient Active Problem List   Diagnosis   • Morbidly obese (HCC)   • Hypertension   • Hypothyroidism   • DVT (deep venous thrombosis) (HCC)   • Sleep apnea   • Coronary artery disease of native artery of native heart with stable angina pectoris (HCC)   • PCOS (polycystic ovarian syndrome)   • Anxiety   • Depression   • OCD (obsessive compulsive disorder)   • Diverticulosis   • Family history of colon cancer   • History of DVT of lower extremity   • Gastroenteritis   • Screen for colon cancer   • Chest pain, unspecified type   • Syncope and collapse   • Hypokalemia   • Pituitary macroadenoma (HCC)     Past Medical History:   Diagnosis Date   • Abdominal pain    • Anemia    • Anxiety    • Arthritis    • Back pain    • Bronchitis    • Cardiovascular disease    • Cataract    • Coronary artery disease    • Depression    • Diarrhea     chronic- since kid   • Diverticulitis    • DVT (deep venous thrombosis) (HCC)     left lower calf   • Eczema     stress induced   • Gall stones    • Hashimoto's disease    • Heart attack (HCC)     x5   • History of transfusion     no reaction to blood; age 16   • Hyperlipidemia    • Hypertension    • Hypothyroidism    • Kidney stone    • Lyme disease    • Migraine headache    • OCD (obsessive compulsive disorder)    • Panic attack    • PCOS (polycystic ovarian syndrome)     takes metformin   • Pituitary macroadenoma (HCC) 2022   • PNA (pneumonia)    • Seasonal allergies    • Sleep apnea     cpap compliant   • Wears glasses      Past Surgical History:   Procedure Laterality Date   • BILATERAL BREAST REDUCTION     • CARDIAC CATHETERIZATION  10/2019   • CHOLECYSTECTOMY     • COLONOSCOPY N/A 2021     Procedure: Colonoscopy with polypectomy;  Surgeon: Lamont Rm MD;  Location: [facility] ENDOSCOPY;  Service: Gastroenterology;  Laterality: N/A;   • KNEE SURGERY Right     scopy   • TONSILLECTOMY        General Information     Row Name 07/25/22 1154          Physical Therapy Time and Intention    Document Type discharge evaluation/summary  -AE     Mode of Treatment physical therapy  -AE     Row Name 07/25/22 1154          General Information    Patient Profile Reviewed yes  -AE     Prior Level of Function independent:;all household mobility;gait;transfer;bed mobility;ADL's;dressing;bathing  Has SPC but does not use all the time  -AE     Existing Precautions/Restrictions other (see comments)  Recurrent syncope  -AE     Barriers to Rehab previous functional deficit  -AE     Row Name 07/25/22 1154          Living Environment    People in Home child(croy), adult  Pt's cousin and 10 yo son  -AE     Row Name 07/25/22 1154          Home Main Entrance    Number of Stairs, Main Entrance ten  -AE     Stair Railings, Main Entrance railing on left side (ascending)  -AE     Row Name 07/25/22 1154          Stairs Within Home, Primary    Number of Stairs, Within Home, Primary none  -AE     Stair Railings, Within Home, Primary none  -AE     Row Name 07/25/22 1154          Cognition    Orientation Status (Cognition) oriented x 4  -AE     Row Name 07/25/22 1154          Safety Issues, Functional Mobility    Impairments Affecting Function (Mobility) endurance/activity tolerance;shortness of breath  -AE           User Key  (r) = Recorded By, (t) = Taken By, (c) = Cosigned By    Initials Name Provider Type    AE David Martin PT Physical Therapist               Mobility     Row Name 07/25/22 1156          Bed Mobility    Comment, (Bed Mobility) Pt received and left UIC  -AE     Row Name 07/25/22 1157          Transfers    Comment, (Transfers) No LOB noted, pt required no VCs for hand placement or sequencing.  -AE     Row Name  07/25/22 1156          Sit-Stand Transfer    Sit-Stand [place] (Transfers) independent  -AE     Row Name 07/25/22 1156          Gait/Stairs (Locomotion)    [place] Level (Gait) standby assist  -AE     Distance in Feet (Gait) 300  -AE     Deviations/Abnormal Patterns (Gait) base of support, wide;tamia decreased;gait speed decreased;stride length decreased  -AE     [place] Level (Stairs) stand by assist  -AE     Handrail Location (Stairs) left side (ascending)  -AE     Number of Steps (Stairs) 10  -AE     Ascending Technique (Stairs) step-over-step  -AE     Descending Technique (Stairs) step-over-step  -AE     Comment, (Gait/Stairs) Pt demo step through gait pattern with slowed tamia, wide PATRIC, and decreased gait speed. Pt demo good safety awareness and pacing during session. Pt understands mobility limitations and how to compensate for them.  -AE           User Key  (r) = Recorded By, (t) = Taken By, (c) = Cosigned By    Initials Name Provider Type    AE David Martin, PT Physical Therapist               Obj/Interventions     Row Name 07/25/22 1200          Range of Motion Comprehensive    General Range of Motion bilateral lower extremity ROM WFL  -AE     Row Name 07/25/22 1200          Strength Comprehensive (MMT)    Comment, General Manual Muscle Testing (MMT) Assessment BLE grossly 4+/5  -AE     Row Name 07/25/22 1200          Balance    Balance Assessment sitting static balance;sitting dynamic balance;sit to stand dynamic balance;standing static balance;standing dynamic balance  -AE     Static Sitting Balance independent  -AE     Dynamic Sitting Balance independent  -AE     Position, Sitting Balance unsupported  -AE     Sit to Stand Dynamic Balance standby assist  -AE     Static Standing Balance independent  -AE     Dynamic Standing Balance standby assist  -AE     Position/Device Used, Standing Balance unsupported  -AE     Row Name 07/25/22 1200          Sensory Assessment (Somatosensory)     Sensory Assessment (Somatosensory) LE sensation intact  -AE           User Key  (r) = Recorded By, (t) = Taken By, (c) = Cosigned By    Initials Name Provider Type    AE David Martin, PT Physical Therapist               Goals/Plan    No documentation.                Clinical Impression     Row Name 07/25/22 1201          Pain    Additional Documentation Pain Scale: FACES Pre/Post-Treatment (Group)  -AE     Row Name 07/25/22 1201          Pain Scale: FACES Pre/Post-Treatment    Pain: FACES Scale, Pretreatment 0-->no hurt  -AE     Posttreatment Pain Rating 0-->no hurt  -AE     Row Name 07/25/22 1201          Plan of Care Review    Plan of Care Reviewed With patient  -AE     Progress no change  -AE     Outcome Evaluation PT evaluation completed. Pt presents with generalized deconditioning and recurrent syncope. Pt ambulated 300ft with SBA for safety, pt demo good balance and safety awareness throughout session. Pt educated on pacing and stair training. Pt ascended/descended 10 steps with L handrail ascending and B handrail descending with step over step pattern. Pt does not demo deficits which require skilled intervention at this time, will sign off. Recommend D/C home with OP services.  -AE     Row Name 07/25/22 1201          Therapy Assessment/Plan (PT)    Patient/Family Therapy Goals Statement (PT) Return home  -AE     Criteria for Skilled Interventions Met (PT) no;no problems identified which require skilled intervention  -AE     Therapy Frequency (PT) evaluation only  -AE     Row Name 07/25/22 1201          Vital Signs    Pre Systolic BP Rehab 137  -AE     Pre Treatment Diastolic BP 81  -AE     Pretreatment Heart Rate (beats/min) 90  -AE     Posttreatment Heart Rate (beats/min) 90  -AE     Pre SpO2 (%) 96  -AE     O2 Delivery Pre Treatment room air  -AE     O2 Delivery Intra Treatment room air  -AE     Post SpO2 (%) 98  -AE     O2 Delivery Post Treatment room air  -AE     Pre Patient Position Sitting  -AE      Intra Patient Position Standing  -AE     Post Patient Position Sitting  -AE     Row Name 07/25/22 1201          Positioning and Restraints    Pre-Treatment Position sitting in chair/recliner  -AE     Post Treatment Position chair  -AE     In Chair notified nsg;sitting;call light within reach;encouraged to call for assist  -AE           User Key  (r) = Recorded By, (t) = Taken By, (c) = Cosigned By    Initials Name Provider Type    AE David Martin, JAMES Physical Therapist               Outcome Measures     Row Name 07/25/22 1204 07/25/22 0800       How much help from another person do you currently need...    Turning from your back to your side while in flat bed without using bedrails? 4  -AE 4  -MG    Moving from lying on back to sitting on the side of a flat bed without bedrails? 4  -AE 4  -MG    Moving to and from a bed to a chair (including a wheelchair)? 4  -AE 4  -MG    Standing up from a chair using your arms (e.g., wheelchair, bedside chair)? 4  -AE 4  -MG    Climbing 3-5 steps with a railing? 4  -AE 4  -MG    To walk in hospital room? 4  -AE 4  -MG    AM-PAC 6 Clicks Score (PT) 24  -AE 24  -MG    Highest level of mobility 8 --> Walked 250 feet or more  -AE 8 --> Walked 250 feet or more  -MG    Row Name 07/25/22 1204 07/25/22 0922       Functional Assessment    Outcome Measure Options AM-PAC 6 Clicks Basic Mobility (PT)  -AE AM-PAC 6 Clicks Daily Activity (OT)  -AC          User Key  (r) = Recorded By, (t) = Taken By, (c) = Cosigned By    Initials Name Provider Type    Joyce Herr, OT Occupational Therapist    MG Caity Dowd, RN Registered Nurse    David Alvarenga, PT Physical Therapist              Physical Therapy Education                 Title: PT OT SLP Therapies (In Progress)     Topic: Physical Therapy (In Progress)     Point: Mobility training (Done)     Learning Progress Summary           Patient Acceptance, E, VU by AE at 7/25/2022 1050                   Point: Home exercise  program (Not Started)     Learner Progress:  Not documented in this visit.          Point: Body mechanics (Done)     Learning Progress Summary           Patient Acceptance, E, VU by AE at 7/25/2022 1050                   Point: Precautions (Done)     Learning Progress Summary           Patient Acceptance, E, VU by AE at 7/25/2022 1050                               User Key     Initials Effective Dates Name Provider Type Discipline    AE 09/21/21 -  David Martin PT Physical Therapist PT              PT Recommendation and Plan     Plan of Care Reviewed With: patient  Progress: no change  Outcome Evaluation: PT evaluation completed. Pt presents with generalized deconditioning and recurrent syncope. Pt ambulated 300ft with SBA for safety, pt demo good balance and safety awareness throughout session. Pt educated on pacing and stair training. Pt ascended/descended 10 steps with L handrail ascending and B handrail descending with step over step pattern. Pt does not demo deficits which require skilled intervention at this time, will sign off. Recommend D/C home with OP services.     Time Calculation:    PT Charges     Row Name 07/25/22 1205             Time Calculation    Start Time 1050  -AE      PT Received On 07/25/22  -AE              Untimed Charges    PT Eval/Re-eval Minutes 46  -AE              Total Minutes    Untimed Charges Total Minutes 46  -AE       Total Minutes 46  -AE            User Key  (r) = Recorded By, (t) = Taken By, (c) = Cosigned By    Initials Name Provider Type    AE David Martin PT Physical Therapist              Therapy Charges for Today     Code Description Service Date Service Provider Modifiers Qty    [id number] HC PT EVAL LOW COMPLEXITY 4 7/25/2022 David Martin PT GP 1          PT G-Codes  Outcome Measure Options: AM-PAC 6 Clicks Basic Mobility (PT)  AM-PAC 6 Clicks Score (PT): 24  AM-PAC 6 Clicks Score (OT): 24    PT Discharge Summary  Anticipated Discharge Disposition (PT):  home, home with outpatient therapy services    David Martin, PT  7/25/2022

## 2022-07-25 NOTE — PLAN OF CARE
Goal Outcome Evaluation:  Plan of Care Reviewed With: patient        Progress: no change  Outcome Evaluation: PT evaluation completed. Pt presents with generalized deconditioning and recurrent syncope. Pt ambulated 300ft with SBA for safety, pt demo good balance and safety awareness throughout session. Pt educated on pacing and stair training. Pt ascended/descended 10 steps with L handrail ascending and B handrail descending with step over step pattern. Pt does not demo deficits which require skilled intervention at this time, will sign off. Recommend D/C home with OP services.

## 2022-07-25 NOTE — PLAN OF CARE
Goal Outcome Evaluation:  Plan of Care Reviewed With: patient          OT eval complete.  Pt issued and educated in use of  AE for LBD d/t limited distal reach.  Pt mod ind to don /doff socks with sock aid and reacher. Pt reported weak , decreased sensation in hands and dropping objects - pt educated in hand strengthening, sensation HEP and comp strategies wtih pt verbalizing understanding.   Pt independent grooming, ind self feeding.  Pt ambulated in room independently without AD. No deficits which would require further OT intervention at this time.  Recommend home upon d/c.

## 2022-07-25 NOTE — DISCHARGE SUMMARY
[facility] Medicine Services  DISCHARGE SUMMARY    Patient Name: Jazmín Hicks  : 1970  MRN: [id number]    Date of Admission: 2022  1:45 PM  Date of Discharge:  2022  Primary Care Physician: Isabel Hidalgo APRN    Consults     Date and Time Order Name Status Description    2022  1:54 PM Inpatient Cardiology Consult Completed     2022 11:39 AM Inpatient Neurosurgery Consult Completed           Hospital Course     Presenting Problem:   Chest pain, unspecified type [R07.9]    Active Hospital Problems    Diagnosis  POA   • **Syncope and collapse [R55]  Yes   • Chest pain, unspecified type [R07.9]  Yes   • Hypokalemia [E87.6]  Yes   • Pituitary macroadenoma (HCC) [D35.2]  Yes   • Anxiety [F41.9]  Yes   • PCOS (polycystic ovarian syndrome) [E28.2]  Yes   • DVT (deep venous thrombosis) (HCC) [I82.409]  Yes   • Hypertension [I10]  Yes   • Hypothyroidism [E03.9]  Yes   • Sleep apnea [G47.30]  Yes   • Coronary artery disease of native artery of native heart with stable angina pectoris (HCC) [I25.118]  Yes      Resolved Hospital Problems   No resolved problems to display.          Hospital Course:  Jazmín Hicks is a 52 y.o. female with HTN, HLD, PCOS, DM, CAD, syncope, anxiety/depression, PE/DVT on chronic eliquis therapy, post-partum Hashimoto's hypothyroidism who presented for several days of midsternal chest pain and multiple syncopal events.  She was found to have pituitary macroadenoma but neurosurgery does not feel this would be contributing to symptoms prompting presentation.  Cardiology was consulted and recommended stress test which was normal and 30-day monitor.     This patient's problems and plans were partially entered by my partner and updated as appropriate by me 22.     Recurrent syncope  Chest pain  CAD  -Orthostatics negative  -Neurosurgery does not feel pituitary mass related to syncope events  -Cardiology consulted.  Stress  test normal.  Recommending 30 day cardiac monitor at discharge.      New diagnosis pituitary macroadenoma  -TSH, prolactin WNL  -ACTH and insulin-like growth factor pending  -Will need close outpt f/u with Dr. Styles in clinic for formal testing and surveillence     PCOS with glucose intolerance/pre-DM  -On metformin and aldactone as outpatient  -A1c 6.4%     History of PE/DVT  -Continue Eliquis     Post-gestation Hashimoto's with chronic hypothyroidism  -TSH WNL  -Continue levothyroxine      Discharge Follow Up Recommendations for outpatient labs/diagnostics:  F/U with PCP within 1 week  F/U with cardiology 1 month  F/U with neurosurgery ~2 weeks    Day of Discharge     HPI:   She is feeling better today.  No chest discomfort.    Review of Systems  Gen- No fevers  CV- No chest pain      Vital Signs:   Temp:  [98.2 °F (36.8 °C)-98.7 °F (37.1 °C)] 98.5 °F (36.9 °C)  Heart Rate:  [72-97] 89  Resp:  [18] 18  BP: (116-149)/() 137/81  Flow (L/min):  [2] 2      Physical Exam:  Constitutional: No acute distress, awake, alert, sitting up on edge of bed  HENT: NCAT, mucous membranes moist  Respiratory: Respiratory effort normal on room air  Cardiovascular: RRR  Musculoskeletal: No bilateral ankle edema  Psychiatric: Appropriate affect, cooperative  Neurologic: Speech clear  Skin: No rashes on exposed surfaces    Pertinent  and/or Most Recent Results     LAB RESULTS:      Lab 07/23/22  1402   WBC 8.30   HEMOGLOBIN 13.3   HEMATOCRIT 41.1   PLATELETS 317   NEUTROS ABS 4.58   IMMATURE GRANS (ABS) 0.09*   LYMPHS ABS 3.03   MONOS ABS 0.41   EOS ABS 0.12   MCV 85.4         Lab 07/23/22  1740 07/23/22  1402   SODIUM  --  139   POTASSIUM  --  3.3*   CHLORIDE  --  103   CO2  --  26.0   ANION GAP  --  10.0   BUN  --  11   CREATININE  --  0.99   EGFR  --  69.6   GLUCOSE  --  148*   CALCIUM  --  9.0   HEMOGLOBIN A1C  --  6.40*   TSH 1.990  --          Lab 07/23/22  1402   TOTAL PROTEIN 7.0   ALBUMIN 4.00   GLOBULIN 3.0   ALT  (SGPT) 35*   AST (SGOT) 35*   BILIRUBIN 0.4   ALK PHOS 99         Lab 07/24/22  0000 07/23/22  1740 07/23/22  1402   PROBNP  --   --  100.5   TROPONIN T <0.010 <0.010 <0.010         Lab 07/24/22  0351   CHOLESTEROL 152   LDL CHOL 97   HDL CHOL 32*   TRIGLYCERIDES 126             Brief Urine Lab Results     None        Microbiology Results (last 10 days)     Procedure Component Value - Date/Time    COVID PRE-OP / PRE-PROCEDURE SCREENING ORDER (NO ISOLATION) - Swab, Nasopharynx [[id number]]  (Normal) Collected: 07/23/22 1532    Lab Status: Final result Specimen: Swab from Nasopharynx Updated: 07/23/22 1609    Narrative:      The following orders were created for panel order COVID PRE-OP / PRE-PROCEDURE SCREENING ORDER (NO ISOLATION) - Swab, Nasopharynx.  Procedure                               Abnormality         Status                     ---------                               -----------         ------                     COVID-19 and FLU A/B PCR...[[id number]]  Normal              Final result                 Please view results for these tests on the individual orders.    COVID-19 and FLU A/B PCR - Swab, Nasopharynx [[id number]]  (Normal) Collected: 07/23/22 1532    Lab Status: Final result Specimen: Swab from Nasopharynx Updated: 07/23/22 1609     COVID19 Not Detected     Influenza A PCR Not Detected     Influenza B PCR Not Detected    Narrative:      Fact sheet for providers: https://www.fda.gov/media/229918/download    Fact sheet for patients: https://www.fda.gov/media/809492/download    Test performed by PCR.          Adult Transthoracic Echo Complete W/ Cont if Necessary Per Protocol    Result Date: 7/24/2022  · Left ventricular ejection fraction appears to be 61 - 65%. Left ventricular systolic function is normal. · Left ventricular wall thickness is consistent with mild concentric hypertrophy. · Left ventricular diastolic function was normal. · Estimated right ventricular systolic pressure from tricuspid  regurgitation is normal (<35 mmHg). Calculated right ventricular systolic pressure from tricuspid regurgitation is 21 mmHg.      CT Head Without Contrast    Result Date: 7/23/2022  DATE OF EXAM: 7/23/2022 3:37 PM  PROCEDURE: CT HEAD WO CONTRAST-  INDICATIONS: syncope  COMPARISON: No comparisons available.  TECHNIQUE: Routine transaxial and coronal reconstruction images were obtained through the head without the administration of contrast. Automated exposure control and iterative reconstruction methods were used.  The radiation dose reduction device was turned on for each scan per the ALARA (As Low as Reasonably Achievable) protocol.  FINDINGS: No acute trauma to the calvarium is identified. The included mastoid air cells and middle ear spaces appear clear. Paranasal sinuses appear clear except for an unusual area projecting into the left sphenoid sinus, with partial thin bony covering possibly reflecting a defect in the floor of the sella/calvarium. No generalized sellar enlargement is identified. There is no evidence of any fluid in the sphenoid sinus or other paranasal sinuses to suggest CSF leak. This is perhaps best appreciated on the coronal imaging series, images 33 and 34.  Elsewhere, no significant focal or generalized brain atrophy is seen. There is no evidence of hemorrhage, contusion, edema/infarct, intra-axial mass or mass effect, hydrocephalus, or abnormal extra-axial collection.       1. Suspected incidental developmental variant of the skull base, with what may be a bony defect of the base of the skull. If patient has not had previous MRI study, consider follow-up MRI of the brain and pituitary. 2. No acute intracranial disease is identified.  This report was finalized on 7/23/2022 4:38 PM by Dr. Genaro Sheffield MD.      MRI Brain With & Without Contrast    Result Date: 7/24/2022  DATE OF EXAM: 7/24/2022 2:02 AM  PROCEDURE: MRI BRAIN W WO CONTRAST-  INDICATIONS: irreglarity at skull base and pituitary  adenoma suspected; R07.9-Chest pain, unspecified; R55-Syncope and collapse  COMPARISON: No comparisons available.  TECHNIQUE: Multiplanar multisequence images of the brain were performed prior to and after the uneventful intravenous contrast administration of 20 MultiHance.  FINDINGS: No intra-axial foci of restricted diffusion or abnormal susceptibility. There are no significant intra-axial signal abnormalities or foci of restricted diffusion. The CSF spaces/ventricles are age appropriate. There are no abnormal extra-axial fluid collections. Cerebellar tonsils are in normal position. Major intracranial flow voids are present. There is a lobular homogeneous mass that appears to arise within the sella from the left side of the pituitary gland and protrudes into the sphenoid sinus. The mass enhances diffusely but to a lesser degree than the remaining normal pituitary gland. No dural tail is visualized. The mass measures 1.4 cm (craniocaudal) by 1.4 cm (transverse) by 1.9 cm (anteroposterior). There is no gross cavernous sinus invasion. There is no suprasellar component. No skull abnormality is demonstrated.       1. Negative brain 2. Findings are compatible with a pituitary macroadenoma with extension into the sphenoid sinus. There is no gross cavernous sinus invasion or suprasellar component  This report was finalized on 7/24/2022 10:48 AM by Saúl Dye.      XR Chest 1 View    Result Date: 7/23/2022  DATE OF EXAM: 7/23/2022 2:12 PM  PROCEDURE: XR CHEST 1 VW-  INDICATIONS: SOA triage protocol  COMPARISON: 5/10/2021  TECHNIQUE: Single radiographic AP view of the chest was obtained.  FINDINGS: Heart mediastinum and pulmonary vasculature appear within normal limits. Lungs appear moderately well-expanded and clear. No effusion or pneumothorax is seen.      No evidence of active chest disease.  This report was finalized on 7/23/2022 2:24 PM by Dr. Genaro Sheffield MD.      CT Angiogram Chest    Result Date: 7/23/2022  DATE  OF EXAM: 7/23/2022 3:37 PM  PROCEDURE: CT ANGIOGRAM CHEST-  INDICATIONS: syncope  COMPARISON: 5/10/2021  TECHNIQUE: Contiguous axial imaging was obtained from the thoracic inlet through the upper abdomen following the intravenous administration of 75 mL of Isovue 370. Reconstructed coronal and sagittal images were also obtained. Automated exposure control and iterative reconstruction methods were used.  The radiation dose reduction device was turned on for each scan per the ALARA (As Low as Reasonably Achievable) protocol.  FINDINGS: There is suboptimal contrast opacification of the main pulmonary arteries, but generally good contrast opacification of the peripheral and smaller order vessels with Hounsfield unit measurements of up to 275. Overall, this is sufficient to exclude significant embolic disease and no evidence of pulmonary emboli are seen. Thoracic aorta appears normal. No pericardial or pleural effusion or significant mediastinal adenopathy is seen. No significant coronary artery calcification is identified.  In the lungs, there is a new, tiny cluster of nodular opacities the left anterior apex, over a roughly 5 cm area. This may represent new granulomatous disease, although these are relatively ill-defined, with an almost alveolar pattern. Accordingly, a small area of developing lobar pneumonia may be present instead. No significant pulmonary parenchymal disease is seen elsewhere.  Included images of the upper abdomen show diffuse fatty liver change. Gallbladder is surgically absent. Spleen is not enlarged. No significant abnormalities are noted of the pancreas adrenal glands, or upper renal poles. Bony structures appear to be intact.        1. No evidence of pulmonary embolic disease. 2. Small, approximately 5 cm area of the anterior left upper lobe, with micronodular or early alveolar disease. With this appearance, either a small focus of lobar pneumonia, or very focal granulomatous infection could be  present. 3. No evidence of active chest disease elsewhere.  This report was finalized on 7/23/2022 5:04 PM by Dr. Genaro Sheffield MD.      Stress Test With Pet Myocardial Perfusion    Result Date: 7/25/2022  · Patient denied chest pain during the stress test. · There were no ischemic EKG changes. · Myocardial perfusion imaging indicates a normal myocardial perfusion study with no evidence of ischemia. No TID · REST:78% EF STRESS: 79% EF. · There is no significant coronary artery calcification. · Impressions are consistent with a low risk study.                Results for orders placed during the hospital encounter of 07/23/22    Adult Transthoracic Echo Complete W/ Cont if Necessary Per Protocol    Interpretation Summary  · Left ventricular ejection fraction appears to be 61 - 65%. Left ventricular systolic function is normal.  · Left ventricular wall thickness is consistent with mild concentric hypertrophy.  · Left ventricular diastolic function was normal.  · Estimated right ventricular systolic pressure from tricuspid regurgitation is normal (<35 mmHg). Calculated right ventricular systolic pressure from tricuspid regurgitation is 21 mmHg.      Plan for Follow-up of Pending Labs/Results: F/U in neurosurgery clinic for further results  Pending Labs     Order Current Status    ACTH In process    Insulin-like Growth Factor In process        Discharge Details        Discharge Medications      Changes to Medications      Instructions Start Date   levothyroxine 25 MCG tablet  Commonly known as: SYNTHROID, LEVOTHROID  What changed:   when to take this  additional instructions   TAKE ONE TABLET BY MOUTH DAILY      levothyroxine 200 MCG tablet  Commonly known as: SYNTHROID, LEVOTHROID  What changed: See the new instructions.   TAKE ONE TABLET BY MOUTH DAILY WITH 25 MCG TABLET         Continue These Medications      Instructions Start Date   aspirin 81 MG EC tablet   81 mg, Oral, Daily      atorvastatin 40 MG tablet  Commonly  known as: LIPITOR   TAKE ONE TABLET BY MOUTH ONCE NIGHTLY      colestipol 1 g tablet  Commonly known as: COLESTID   2 g, Oral, 2 Times Daily, Titrate to effectiveness. Do not take within 2 hours of other medications      Eliquis 5 MG tablet tablet  Generic drug: apixaban   TAKE ONE TABLET BY MOUTH EVERY 12 HOURS      metFORMIN 500 MG tablet  Commonly known as: GLUCOPHAGE   TAKE ONE TABLET BY MOUTH DAILY WITH BREAKFAST      metoprolol tartrate 25 MG tablet  Commonly known as: LOPRESSOR   TAKE ONE TABLET BY MOUTH TWICE A DAY      multivitamin with minerals tablet tablet   1 tablet, Oral, Daily, Vitafusion      NIFEdipine CC 60 MG 24 hr tablet  Commonly known as: ADALAT CC   60 mg, Oral, Daily      nitroglycerin 0.4 MG/SPRAY spray  Commonly known as: NITROLINGUAL   1 spray, Sublingual, Every 5 Minutes PRN      valsartan-hydrochlorothiazide 80-12.5 MG per tablet  Commonly known as: DIOVAN-HCT   1 tablet, Oral, Daily             Allergies   Allergen Reactions   • Capsaicin Shortness Of Breath   • Adhesive Tape Other (See Comments)     blisters   • Darvon [Propoxyphene] GI Intolerance   • Lisinopril Nausea Only         Discharge Disposition:  Home or Self Care    Diet:  Hospital:  Diet Order   Procedures   • Diet Regular; Cardiac, Consistent Carbohydrate       Activity:  Activity Instructions     Work Restrictions      Type of Restriction: Work    May Return to Work: Specific Date    Return To Work Date: 7/28/2022    With / Without Restrictions: Without Restrictions               CODE STATUS:    Code Status and Medical Interventions:   Ordered at: 07/23/22 8422     Level Of Support Discussed With:    Patient     Code Status (Patient has no pulse and is not breathing):    CPR (Attempt to Resuscitate)     Medical Interventions (Patient has pulse or is breathing):    Full Support       No future appointments.    Additional Instructions for the Follow-ups that You Need to Schedule     Ambulatory Referral to [facility] and  [facility] [place] [facility]   As directed      30 day holter monitor per cardiology    Order Comments: 30 day holter monitor per cardiology     Service Requested: Cardiac Monitor         Discharge Follow-up with Specified Provider: Dr. White; 1 Month   As directed      To: Dr. White    Follow Up: 1 Month         Discharge Follow-up with Specified Provider: Dr. Styles; 2 Weeks   As directed      To: Dr. Styles    Follow Up: 2 Weeks                     Dana Machado MD  07/25/22      Time Spent on Discharge:  I spent  31 minutes on this discharge activity which included: face-to-face encounter with the patient, reviewing the data in the system, coordination of the care with the nursing staff as well as consultants, documentation, and entering orders.

## 2022-07-25 NOTE — OUTREACH NOTE
Prep Survey    Flowsheet Row Responses   [facility] [place] patient discharged from? [place]   Is LACE score < 7 ? Yes   Emergency Room discharge w/ pulse ox? No   Eligibility [facility]   Date of Admission 07/23/22   Date of Discharge 07/25/22   Discharge Disposition Home or Self Care   Discharge diagnosis syncope and collapse   Does the patient have one of the following disease processes/diagnoses(primary or secondary)? Other   Does the patient have Home health ordered? No   Is there a DME ordered? No   Prep survey completed? Yes          FER BANUELOS - Registered Nurse

## 2022-07-25 NOTE — CASE MANAGEMENT/SOCIAL WORK
Discharge Planning Assessment  [facility]     Patient Name: Jazmín Hicks  MRN: [id number]  Today's Date: 7/25/2022    Admit Date: 7/23/2022     Discharge Needs Assessment    No documentation.                Discharge Plan     Row Name 07/25/22 1213       Plan    Plan home/family probable later today.    Patient/Family in Agreement with Plan yes    Plan Comments Observation status: I talked with patient at the bedside and she lives in  [place] with her cousin. Independent at home, works from home, no DME but uses a cane at times, no home o2, no HH. Patient has coverage for meds and Dr.Julia Hidalgo is her PCP. Patient's cousin will pick her up...No d/c needs noted at this time. Kathy @ [id number]    Final Discharge Disposition Code 01 - home or self-care              Continued Care and Services - Admitted Since 7/23/2022    Coordination has not been started for this encounter.          Demographic Summary    No documentation.                Functional Status    No documentation.                Psychosocial    No documentation.                Abuse/Neglect    No documentation.                Legal    No documentation.                Substance Abuse    No documentation.                Patient Forms    No documentation.                   Desiree Garcia, RN

## 2022-07-25 NOTE — PLAN OF CARE
Goal Outcome Evaluation:  Plan of Care Reviewed With: patient        Progress: improving  Outcome Evaluation: pt on CPAP. pain managed per mar. no voiced complaints at this time. will continue to monitor.

## 2022-07-26 ENCOUNTER — TRANSITIONAL CARE MANAGEMENT TELEPHONE ENCOUNTER (OUTPATIENT)
Dept: CALL CENTER | Facility: HOSPITAL | Age: 52
End: 2022-07-26

## 2022-07-26 DIAGNOSIS — I25.118 CORONARY ARTERY DISEASE OF NATIVE ARTERY OF NATIVE HEART WITH STABLE ANGINA PECTORIS: Primary | ICD-10-CM

## 2022-07-26 LAB — ACTH PLAS-MCNC: 16.7 PG/ML (ref 7.2–63.3)

## 2022-07-26 NOTE — OUTREACH NOTE
Call Center TCM Note    Flowsheet Row Responses   [facility] patient discharged from? [place]   Does the patient have one of the following disease processes/diagnoses(primary or secondary)? Other   TCM attempt successful? No   Unsuccessful attempts Attempt 1          Leslie Pugh MA    7/26/2022, 13:13 EDT

## 2022-07-26 NOTE — OUTREACH NOTE
Call Center TCM Note    Flowsheet Row Responses   [facility] patient discharged from? [place]   Does the patient have one of the following disease processes/diagnoses(primary or secondary)? Other   TCM attempt successful? No   Unsuccessful attempts Attempt 2          Leslie Pugh MA    7/26/2022, 15:43 EDT

## 2022-07-27 ENCOUNTER — TRANSITIONAL CARE MANAGEMENT TELEPHONE ENCOUNTER (OUTPATIENT)
Dept: CALL CENTER | Facility: HOSPITAL | Age: 52
End: 2022-07-27

## 2022-07-27 ENCOUNTER — OFFICE VISIT (OUTPATIENT)
Dept: CARDIOLOGY | Facility: HOSPITAL | Age: 52
End: 2022-07-27

## 2022-07-27 ENCOUNTER — OFFICE VISIT (OUTPATIENT)
Dept: FAMILY MEDICINE CLINIC | Facility: CLINIC | Age: 52
End: 2022-07-27

## 2022-07-27 VITALS
WEIGHT: 293 LBS | OXYGEN SATURATION: 96 % | RESPIRATION RATE: 18 BRPM | DIASTOLIC BLOOD PRESSURE: 89 MMHG | HEART RATE: 114 BPM | TEMPERATURE: 95.9 F | BODY MASS INDEX: 48.82 KG/M2 | SYSTOLIC BLOOD PRESSURE: 157 MMHG | HEIGHT: 65 IN

## 2022-07-27 VITALS
BODY MASS INDEX: 48.82 KG/M2 | TEMPERATURE: 97.5 F | HEIGHT: 65 IN | HEART RATE: 104 BPM | SYSTOLIC BLOOD PRESSURE: 118 MMHG | WEIGHT: 293 LBS | OXYGEN SATURATION: 98 % | DIASTOLIC BLOOD PRESSURE: 72 MMHG

## 2022-07-27 DIAGNOSIS — Z09 HOSPITAL DISCHARGE FOLLOW-UP: Primary | ICD-10-CM

## 2022-07-27 DIAGNOSIS — R91.1 NODULE OF LEFT LUNG: ICD-10-CM

## 2022-07-27 DIAGNOSIS — R55 SYNCOPE, UNSPECIFIED SYNCOPE TYPE: ICD-10-CM

## 2022-07-27 DIAGNOSIS — R07.9 CHEST PAIN, UNSPECIFIED TYPE: ICD-10-CM

## 2022-07-27 DIAGNOSIS — G47.33 OBSTRUCTIVE SLEEP APNEA SYNDROME: ICD-10-CM

## 2022-07-27 DIAGNOSIS — R07.9 CHEST PAIN, UNSPECIFIED TYPE: Primary | ICD-10-CM

## 2022-07-27 DIAGNOSIS — E11.9 CONTROLLED TYPE 2 DIABETES MELLITUS WITHOUT COMPLICATION, WITHOUT LONG-TERM CURRENT USE OF INSULIN: ICD-10-CM

## 2022-07-27 DIAGNOSIS — D35.2 PITUITARY MACROADENOMA: ICD-10-CM

## 2022-07-27 DIAGNOSIS — I10 ESSENTIAL HYPERTENSION: ICD-10-CM

## 2022-07-27 PROBLEM — U07.1 COVID-19: Status: ACTIVE | Noted: 2022-07-27

## 2022-07-27 PROBLEM — U07.1 COVID-19: Status: RESOLVED | Noted: 2022-07-27 | Resolved: 2022-07-27

## 2022-07-27 LAB
BH CV REST NUCLEAR ISOTOPE DOSE: 32.9 MCI
BH CV STRESS BP STAGE 2: NORMAL
BH CV STRESS BP STAGE 3: NORMAL
BH CV STRESS COMMENTS STAGE 1: NORMAL
BH CV STRESS DOSE REGADENOSON STAGE 1: 0.4
BH CV STRESS DURATION MIN STAGE 1: 1
BH CV STRESS DURATION MIN STAGE 2: 1
BH CV STRESS DURATION MIN STAGE 3: 1
BH CV STRESS DURATION MIN STAGE 4: 1
BH CV STRESS DURATION SEC STAGE 1: 0
BH CV STRESS DURATION SEC STAGE 2: 0
BH CV STRESS DURATION SEC STAGE 3: 0
BH CV STRESS DURATION SEC STAGE 4: 0
BH CV STRESS HR STAGE 1: 101
BH CV STRESS HR STAGE 2: 102
BH CV STRESS HR STAGE 3: 100
BH CV STRESS HR STAGE 4: 98
BH CV STRESS NUCLEAR ISOTOPE DOSE: 32.8 MCI
BH CV STRESS O2 STAGE 1: 97
BH CV STRESS O2 STAGE 2: 98
BH CV STRESS O2 STAGE 3: 98
BH CV STRESS O2 STAGE 4: 96
BH CV STRESS PROTOCOL 1: NORMAL
BH CV STRESS RECOVERY BP: NORMAL MMHG
BH CV STRESS RECOVERY HR: 93 BPM
BH CV STRESS RECOVERY O2: 95 %
BH CV STRESS STAGE 1: 1
BH CV STRESS STAGE 2: 2
BH CV STRESS STAGE 3: 3
BH CV STRESS STAGE 4: 4
MAXIMAL PREDICTED HEART RATE: 170 BPM
PERCENT MAX PREDICTED HR: 61.18 %
STRESS BASELINE BP: NORMAL MMHG
STRESS BASELINE HR: 84 BPM
STRESS O2 SAT REST: 94 %
STRESS PERCENT HR: 72 %
STRESS POST ESTIMATED WORKLOAD: 1 METS
STRESS POST EXERCISE DUR MIN: 4 MIN
STRESS POST EXERCISE DUR SEC: 0 SEC
STRESS POST O2 SAT PEAK: 98 %
STRESS POST PEAK BP: NORMAL MMHG
STRESS POST PEAK HR: 104 BPM
STRESS TARGET HR: 145 BPM

## 2022-07-27 PROCEDURE — 99496 TRANSJ CARE MGMT HIGH F2F 7D: CPT | Performed by: NURSE PRACTITIONER

## 2022-07-27 PROCEDURE — 99214 OFFICE O/P EST MOD 30 MIN: CPT | Performed by: NURSE PRACTITIONER

## 2022-07-27 NOTE — PROGRESS NOTES
Transitional Care Follow Up Visit  Subjective     Jazmín Hicks is a 52 y.o. female who presents for a transitional care management visit.    Within 48 business hours after discharge our office contacted her via telephone to coordinate her care and needs.      I reviewed and discussed the details of that call along with the discharge summary, hospital problems, inpatient lab results, inpatient diagnostic studies, and consultation reports with Jazmín.     Current outpatient and discharge medications have been reconciled for the patient.  Reviewed by: BONITA Castellano      Date of TCM Phone Call 7/25/2022   [facility]   Date of Admission 7/23/2022   Date of Discharge 7/25/2022   Discharge Disposition Home or Self Care     Risk for Readmission (LACE) Score: 2 (7/25/2022  6:01 AM)      Patient presents today for TCM visit.  Patient states she is feeling much better now, has had no further episodes of chest pain or syncope.     ED to Hosp-Admission (Discharged) with Dana Machado MD; Melissa Maxwell MD; Florencia Oseguera DO (07/23/2022)  CT Angiogram Chest (07/23/2022 15:51)    Course During Hospital Stay: Patient admitted to [facility] on 7/23/2022 with complaint of chest pain and multiple syncopal events.  Patient was found to have a pituitary macroadenoma.  Neurosurgery was consulted but did not feel that this was the source of patient's symptoms.  Cardiology was also consulted.  Patient had a normal stress test.  Patient was discharged home on 7/25/2022 with 30-day cardiac monitor.  Patient to continue to with neurology and cardiology follow-ups as outpatient.  Patient had an incidental finding of a lung nodule while in the hospital.  She presents today for follow-up regarding this as well as TCM visit.     The following portions of the patient's history were reviewed and updated as appropriate: allergies, current medications, past family history, past medical history,  past social history, past surgical history and problem list.    Review of Systems   Constitutional: Negative.    Respiratory: Negative.    Cardiovascular: Negative.  Negative for chest pain, palpitations and leg swelling.   Gastrointestinal: Negative.    Neurological: Positive for syncope. Negative for dizziness, tremors, seizures, facial asymmetry, speech difficulty, weakness, light-headedness, numbness and headaches.       Objective   Physical Exam  Vitals and nursing note reviewed.   Constitutional:       General: She is not in acute distress.     Appearance: Normal appearance. She is well-developed. She is not ill-appearing, toxic-appearing or diaphoretic.   HENT:      Head: Normocephalic and atraumatic.   Neck:      Vascular: No carotid bruit.   Cardiovascular:      Rate and Rhythm: Normal rate and regular rhythm.      Heart sounds: Normal heart sounds, S1 normal and S2 normal. No murmur heard.  Pulmonary:      Effort: Pulmonary effort is normal. No respiratory distress.      Breath sounds: Normal breath sounds. No stridor. No wheezing.   Musculoskeletal:      Right lower leg: No edema.      Left lower leg: No edema.   Skin:     General: Skin is warm and dry.   Neurological:      General: No focal deficit present.      Mental Status: She is alert and oriented to person, place, and time.   Psychiatric:         Mood and Affect: Mood normal.         Behavior: Behavior normal. Behavior is cooperative.         Thought Content: Thought content normal.         Judgment: Judgment normal.         Assessment & Plan   Diagnoses and all orders for this visit:    1. Hospital discharge follow-up (Primary)    2. Chest pain, unspecified type    3. Syncope, unspecified syncope type  -     Duplex Carotid Ultrasound CAR; Future    4. Nodule of left lung  -     MRI chest w wo contrast; Future    5. Controlled type 2 diabetes mellitus without complication, without long-term current use of insulin (HCC)  -     metFORMIN (GLUCOPHAGE)  500 MG tablet; Take 1 tablet by mouth 2 (Two) Times a Day With Meals.  Dispense: 60 tablet; Refill: 3    Patient's A1c has slightly increased since it was last checked (6.4) and glucose levels have been in the 130s and 140s.  I discussed this with patient and she is amenable to increase the dose of her metformin from 500 mg daily to 500 mg twice daily.  We will recheck her A1c again in 3 months.  Continue other current medications.  Continue with neurology and cardiology follow-ups.  Diagnostic imaging per orders, further recommendation after results evaluation.    Discussed the nature of the medical condition(s) risks, complications, implications, management, safe and proper use of medications. Encouraged medication compliance, and keeping scheduled follow up appointments with me and any other providers.     Call office or RTC if symptoms fail to improve. To ER if symptoms worsen.

## 2022-07-27 NOTE — OUTREACH NOTE
Call Center TCM Note    Flowsheet Row Responses   [facility] patient discharged from? [facility]   Does the patient have one of the following disease processes/diagnoses(primary or secondary)? Other   TCM attempt successful? No   Unsuccessful attempts Attempt 3          Dilcia Arana LPN    7/27/2022, 15:16 EDT

## 2022-07-28 DIAGNOSIS — E03.8 HYPOTHYROIDISM DUE TO HASHIMOTO'S THYROIDITIS: ICD-10-CM

## 2022-07-28 DIAGNOSIS — I10 ESSENTIAL HYPERTENSION: ICD-10-CM

## 2022-07-28 DIAGNOSIS — I25.10 CARDIOVASCULAR DISEASE: ICD-10-CM

## 2022-07-28 DIAGNOSIS — Z86.718 HISTORY OF DVT OF LOWER EXTREMITY: ICD-10-CM

## 2022-07-28 DIAGNOSIS — E06.3 HYPOTHYROIDISM DUE TO HASHIMOTO'S THYROIDITIS: ICD-10-CM

## 2022-07-28 LAB — IGF-I SERPL-MCNC: 114 NG/ML (ref 70–225)

## 2022-07-28 RX ORDER — NIFEDIPINE 60 MG/1
60 TABLET, FILM COATED, EXTENDED RELEASE ORAL DAILY
Qty: 30 TABLET | Refills: 5 | Status: SHIPPED | OUTPATIENT
Start: 2022-07-28 | End: 2022-10-02 | Stop reason: SDUPTHER

## 2022-07-28 RX ORDER — VALSARTAN AND HYDROCHLOROTHIAZIDE 80; 12.5 MG/1; MG/1
1 TABLET, FILM COATED ORAL DAILY
Qty: 30 TABLET | Refills: 5 | Status: SHIPPED | OUTPATIENT
Start: 2022-07-28 | End: 2022-10-02 | Stop reason: SDUPTHER

## 2022-07-28 NOTE — PROGRESS NOTES
"Chief Complaint  Establish Care, Hypertension, and Chest Pain    Subjective    History of Present Illness {CC  Problem List  Visit  Diagnosis   Encounters  Notes  Medications  Labs  Result Review Imaging  Media :23}       History of Present Illness   52-year-old female presents to the office today for ongoing evaluation for chest pain, hypertension and tachycardia. patient was recently hospitalized to [facility]  2022.  Patient has a history of hypertension, hyperlipidemia, PCOS, diabetes, CAD, syncope, anxiety and depression, PE/DVT, postpartum Hashimoto's hypothyroidism, morbid obesity who presented with midsternal chest pain multiple syncopal events.  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  Patient underwent stress test which was normal and she is currently wearing a 30-day monitor.  Patient reports she has lost 100 pounds in the last 18 months.  Patient reports that her brother has A. fib history of multiple stents, pacemaker and ICD.  Another brother has a pacemaker, A. fib and stents.  Her sister has an ICD and A. Fib.  She reports that her chest pain has resolved.  Vital Signs:   Vitals:    07/27/22 1400 07/27/22 1401 07/27/22 1402   BP: 136/95 175/98 157/89   BP Location: Right arm Left arm Left arm   Patient Position: Sitting Standing Sitting   Cuff Size: Adult Adult Adult   Pulse: 118 115 114   Resp: 18  18   Temp: 95.9 °F (35.5 °C) 95.9 °F (35.5 °C) 95.9 °F (35.5 °C)   TempSrc: Temporal Temporal Temporal   SpO2: 98%  96%   Weight:   (!) 158 kg (348 lb 3.2 oz)   Height:   165.1 cm (65\")     Body mass index is 57.94 kg/m².  Physical Exam  Vitals and nursing note reviewed.   Constitutional:       Appearance: Normal appearance.   HENT:      Head: Normocephalic.   Eyes:      Pupils: Pupils are equal, round, and reactive to light.   Cardiovascular:      Rate and Rhythm: Normal rate and regular rhythm.      " Pulses: Normal pulses.      Heart sounds: Normal heart sounds. No murmur heard.  Pulmonary:      Effort: Pulmonary effort is normal.      Breath sounds: Normal breath sounds.   Abdominal:      General: Bowel sounds are normal.      Palpations: Abdomen is soft.   Musculoskeletal:         General: Normal range of motion.      Cervical back: Normal range of motion.      Right lower leg: No edema.      Left lower leg: No edema.   Skin:     General: Skin is warm and dry.      Capillary Refill: Capillary refill takes less than 2 seconds.   Neurological:      Mental Status: She is alert and oriented to person, place, and time.   Psychiatric:         Mood and Affect: Mood normal.         Thought Content: Thought content normal.              Result Review  Data Reviewed:{ Labs  Result Review  Imaging  Med Tab  Media :23}   Adult Transthoracic Echo Complete W/ Cont if Necessary Per Protocol (07/24/2022 10:37)  Stress Test With Pet Myocardial Perfusion (07/25/2022 08:33)             Assessment and Plan {CC Problem List  Visit Diagnosis  ROS  Review (Popup)  Health Maintenance  Quality  BestPractice  Medications  SmartSets  SnapShot Encounters  Media :23}   1. Chest pain, unspecified type  Recently had normal stress and echo    2. Obstructive sleep apnea syndrome    - Ambulatory Referral to Sleep Medicine    3. Essential hypertension  Well-controlled on Lopressor, nifedipine, Diovan HCTZ    4. Pituitary macroadenoma (HCC)  Evaluated by neurosurgery during recent hospital stay        Follow Up {Instructions Charge Capture  Follow-up Communications :23}   Return if symptoms worsen or fail to improve.    Patient was given instructions and counseling regarding her condition or for health maintenance advice. Please see specific information pulled into the AVS if appropriate.  Patient was instructed to call the [facility] with any questions, concerns, or worsening symptoms.

## 2022-07-28 NOTE — TELEPHONE ENCOUNTER
Rx Refill Note  Requested Prescriptions     Pending Prescriptions Disp Refills   • NIFEdipine CC (ADALAT CC) 60 MG 24 hr tablet 30 tablet 5     Sig: Take 1 tablet by mouth Daily.      Last office visit with prescribing clinician: 7/27/2022      Next office visit with prescribing clinician: 7/28/2022            Obdulio Knight MA  07/28/22, 15:48 EDT

## 2022-07-28 NOTE — TELEPHONE ENCOUNTER
Rx Refill Note  Requested Prescriptions     Pending Prescriptions Disp Refills   • valsartan-hydrochlorothiazide (DIOVAN-HCT) 80-12.5 MG per tablet 30 tablet 5     Sig: Take 1 tablet by mouth Daily.      Last office visit with prescribing clinician: 7/27/2022      Next office visit with prescribing clinician: 10/24/2022            Obdulio Knight MA  07/28/22, 15:49 EDT

## 2022-07-29 RX ORDER — LEVOTHYROXINE SODIUM 0.03 MG/1
25 TABLET ORAL DAILY
Qty: 90 TABLET | Refills: 0 | Status: SHIPPED | OUTPATIENT
Start: 2022-07-29 | End: 2022-10-02 | Stop reason: SDUPTHER

## 2022-07-29 RX ORDER — APIXABAN 5 MG/1
5 TABLET, FILM COATED ORAL EVERY 12 HOURS
Qty: 180 TABLET | Refills: 0 | Status: SHIPPED | OUTPATIENT
Start: 2022-07-29 | End: 2022-10-02 | Stop reason: SDUPTHER

## 2022-07-29 RX ORDER — LEVOTHYROXINE SODIUM 0.2 MG/1
200 TABLET ORAL DAILY
Qty: 90 TABLET | Refills: 0 | Status: SHIPPED | OUTPATIENT
Start: 2022-07-29 | End: 2022-10-02 | Stop reason: SDUPTHER

## 2022-07-29 RX ORDER — ATORVASTATIN CALCIUM 40 MG/1
40 TABLET, FILM COATED ORAL NIGHTLY
Qty: 90 TABLET | Refills: 0 | Status: SHIPPED | OUTPATIENT
Start: 2022-07-29 | End: 2022-10-02 | Stop reason: SDUPTHER

## 2022-08-03 ENCOUNTER — TELEMEDICINE (OUTPATIENT)
Dept: SLEEP MEDICINE | Facility: CLINIC | Age: 52
End: 2022-08-03

## 2022-08-03 ENCOUNTER — PRIOR AUTHORIZATION (OUTPATIENT)
Dept: FAMILY MEDICINE CLINIC | Facility: CLINIC | Age: 52
End: 2022-08-03

## 2022-08-03 DIAGNOSIS — G47.33 OSA (OBSTRUCTIVE SLEEP APNEA): Primary | ICD-10-CM

## 2022-08-03 PROCEDURE — 99214 OFFICE O/P EST MOD 30 MIN: CPT | Performed by: NURSE PRACTITIONER

## 2022-08-03 NOTE — TELEPHONE ENCOUNTER
ELIQUIS 5MG TABLETS     Key: [id number]    The member recently filled this medication and will be able to return for their next refill according to their plan limits.

## 2022-08-04 ENCOUNTER — TELEPHONE (OUTPATIENT)
Dept: CARDIOLOGY | Facility: CLINIC | Age: 52
End: 2022-08-04

## 2022-08-04 NOTE — PROGRESS NOTES
[facility] Initial Evaluation    CHIEF COMPLAINT    Fatigue    Referred by:  BONITA Putnam    HISTORY OF PRESENT ILLNESS    Jazmín Hicks is a 52 y.o.female here today for initial evaluation of her sleep.  She was diagnosed with sleep apnea in 2002.  She was prescribed a CPAP but unfortunately this was lost due to [place].  She stopped getting supplies for her CPAP and has not used it in over 5 years.    She does have snoring, daytime somnolence and fatigue.  She does not feel well rested in the mornings.    She tries to go to bed at the same time every night and gets up at the same time every morning.  She is trying to do some daily exercise but has had difficulty due to her current job.    She has been following closely with cardiology for syncope episodes.  She is unable to drive due to the syncopal episodes.  She was also found to have a DVT and placed on anticoagulation.    She does drink a lot of caffeine throughout the day.    She denies chest pain or palpitations.  She denies any reflux symptoms.  She denies any nasal trauma or sinus surgeries in the past.    She does have a history of autoimmune disorders and does have difficulty sleeping at night due to this.    Her [place] Sleepiness Scale is 16/24.    Patient Active Problem List   Diagnosis   • Morbidly obese (HCC)   • Hypertension   • Hypothyroidism   • DVT (deep venous thrombosis) ([facility])   • ARIADNE (obstructive sleep apnea)   • Coronary artery disease of native artery of native heart with stable angina pectoris ([facility])   • PCOS (polycystic ovarian syndrome)   • Anxiety   • Depression   • OCD (obsessive compulsive disorder)   • Diverticulosis   • Family history of colon cancer   • History of DVT of lower extremity   • Gastroenteritis   • Screen for colon cancer   • Chest pain, unspecified type   • Syncope and collapse   • Hypokalemia   • Pituitary macroadenoma ([facility])       Allergies   Allergen Reactions   •  Capsaicin Shortness Of Breath   • Adhesive Tape Other (See Comments)     blisters   • Darvon [Propoxyphene] GI Intolerance   • Lisinopril Nausea Only       Current Outpatient Medications:   •  aspirin 81 MG EC tablet, Take 1 tablet by mouth Daily., Disp: , Rfl:   •  atorvastatin (LIPITOR) 40 MG tablet, Take 1 tablet by mouth Every Night., Disp: 90 tablet, Rfl: 0  •  colestipol (COLESTID) 1 g tablet, Take 2 tablets by mouth 2 (Two) Times a Day. Titrate to effectiveness. Do not take within 2 hours of other medications, Disp: 120 tablet, Rfl: 5  •  Eliquis 5 MG tablet tablet, Take 1 tablet by mouth Every 12 (Twelve) Hours., Disp: 180 tablet, Rfl: 0  •  levothyroxine (SYNTHROID, LEVOTHROID) 200 MCG tablet, Take 1 tablet by mouth Daily., Disp: 90 tablet, Rfl: 0  •  levothyroxine (SYNTHROID, LEVOTHROID) 25 MCG tablet, Take 1 tablet by mouth Daily., Disp: 90 tablet, Rfl: 0  •  metFORMIN (GLUCOPHAGE) 500 MG tablet, Take 1 tablet by mouth 2 (Two) Times a Day With Meals., Disp: 60 tablet, Rfl: 3  •  metoprolol tartrate (LOPRESSOR) 25 MG tablet, TAKE ONE TABLET BY MOUTH TWICE A DAY, Disp: 60 tablet, Rfl: 11  •  multivitamin with minerals (MULTIVITAMIN ADULT PO), Take 1 tablet by mouth Daily. Vitafusion, Disp: , Rfl:   •  NIFEdipine CC (ADALAT CC) 60 MG 24 hr tablet, Take 1 tablet by mouth Daily., Disp: 30 tablet, Rfl: 5  •  nitroglycerin (NITROLINGUAL) 0.4 MG/SPRAY spray, Place 1 spray under the tongue Every 5 (Five) Minutes As Needed for Chest Pain., Disp: , Rfl:   •  valsartan-hydrochlorothiazide (DIOVAN-HCT) 80-12.5 MG per tablet, Take 1 tablet by mouth Daily., Disp: 30 tablet, Rfl: 5  MEDICATION LIST AND ALLERGIES REVIEWED.    Social History     Tobacco Use   • Smoking status: Former Smoker     Packs/day: 0.75     Years: 20.00     Pack years: 15.00     Types: Cigarettes     Quit date:      Years since quittin.5   • Smokeless tobacco: Never Used   Vaping Use   • Vaping Use: Never used   Substance Use Topics   •  Alcohol use: Yes     Comment: wine 3x month   • Drug use: Never       FAMILY AND SOCIAL HISTORY REVIEWED.    Review of Systems   Constitutional: Positive for activity change and fatigue. Negative for appetite change, fever and unexpected weight change.   HENT: Negative for congestion, postnasal drip, rhinorrhea, sinus pressure, sore throat and voice change.    Eyes: Negative for visual disturbance.   Respiratory: Negative for cough, chest tightness, shortness of breath and wheezing.    Cardiovascular: Negative for chest pain, palpitations and leg swelling.   Gastrointestinal: Negative for abdominal distention, abdominal pain, nausea and vomiting.   Endocrine: Negative for cold intolerance and heat intolerance.   Genitourinary: Negative for difficulty urinating and urgency.   Musculoskeletal: Positive for arthralgias and myalgias. Negative for back pain and neck pain.   Skin: Negative for color change and pallor.   Allergic/Immunologic: Negative for environmental allergies and food allergies.   Neurological: Positive for syncope. Negative for dizziness, weakness and light-headedness.   Hematological: Negative for adenopathy. Does not bruise/bleed easily.   Psychiatric/Behavioral: Positive for sleep disturbance. Negative for agitation and behavioral problems. The patient is nervous/anxious.    .    LMP  (LMP Unknown)     Immunization History   Administered Date(s) Administered   • COVID-19 (PFIZER) PURPLE CAP 06/04/2021, 06/30/2021   • Td 12/28/2018   • Td, Unspecified 12/28/2018       Physical Exam    Unable to use physical exam due to telemedicine visit, patient was in no respiratory distress throughout the entire visit.    PROBLEM LIST    Problem List Items Addressed This Visit        Sleep    ARIADNE (obstructive sleep apnea) - Primary    Relevant Orders    Home Sleep Study            DISCUSSION  You have chosen to receive care through a telehealth visit.  Do you consent to use a video/audio connection for your  medical care today? Yes    Ms. Hicks was here for a sleeping problem.  She was diagnosed with ARIADNE in 2002.  Unfortunately she has been without her CPAP for 5 years.    She does have daytime somnolence, fatigue and nonrestorative sleep.  She does meet qualifications for home sleep study.  I will call her once we receive her home sleep study results.    We did discuss treatment for sleep apnea such as CPAP therapy, oral appliance or surgical repair.  She would be amenable to CPAP therapy if her sleep study shows she has obstructive sleep apnea.    We also discussed good sleep regimen such as laying in the lateral position, going to bed at the same time every night and getting up at the same time every morning, avoiding alcohol and sedatives prior to bedtime and regular activity.  We also discussed limiting her caffeine after lunchtime.  We also discussed maintaining an ideal BMI.    She will follow-up after home sleep study to discuss further options.  I did advise her to call with any additional concerns or questions.    Level of service justified based on 30 minutes spent in patient care on this date of service including, but not limited to: preparing to see the patient, obtaining and/or reviewing history, performing medically appropriate examination, ordering tests/medicine/procedures, independently interpreting results, documenting clinical information in EHR, and counseling/education of patient/family/caregiver. (Level 4 30-39 minutes; Level 5 40-54 minutes)      BONITA Salcido  08/03/202208:51 EDT  Electronically signed     Please note that portions of this note were completed with a voice recognition program.        CC: Isabel Hidalgo APRN

## 2022-08-09 ENCOUNTER — TELEMEDICINE (OUTPATIENT)
Dept: FAMILY MEDICINE CLINIC | Facility: TELEHEALTH | Age: 52
End: 2022-08-09

## 2022-08-09 VITALS — BODY MASS INDEX: 48.82 KG/M2 | HEIGHT: 65 IN | TEMPERATURE: 99.9 F | WEIGHT: 293 LBS

## 2022-08-09 DIAGNOSIS — R09.81 NASAL CONGESTION: ICD-10-CM

## 2022-08-09 DIAGNOSIS — R06.02 SHORTNESS OF BREATH: ICD-10-CM

## 2022-08-09 DIAGNOSIS — R11.0 NAUSEA: ICD-10-CM

## 2022-08-09 DIAGNOSIS — Z20.822 SUSPECTED COVID-19 VIRUS INFECTION: Primary | ICD-10-CM

## 2022-08-09 DIAGNOSIS — J02.9 SORE THROAT: ICD-10-CM

## 2022-08-09 PROCEDURE — U0003 INFECTIOUS AGENT DETECTION BY NUCLEIC ACID (DNA OR RNA); SEVERE ACUTE RESPIRATORY SYNDROME CORONAVIRUS 2 (SARS-COV-2) (CORONAVIRUS DISEASE [COVID-19]), AMPLIFIED PROBE TECHNIQUE, MAKING USE OF HIGH THROUGHPUT TECHNOLOGIES AS DESCRIBED BY CMS-2020-01-R: HCPCS | Performed by: NURSE PRACTITIONER

## 2022-08-09 PROCEDURE — 99213 OFFICE O/P EST LOW 20 MIN: CPT | Performed by: NURSE PRACTITIONER

## 2022-08-09 RX ORDER — GUAIFENESIN 600 MG/1
600 TABLET, EXTENDED RELEASE ORAL 2 TIMES DAILY
Qty: 28 TABLET | Refills: 0 | Status: SHIPPED | OUTPATIENT
Start: 2022-08-09 | End: 2022-08-23

## 2022-08-09 RX ORDER — ONDANSETRON HYDROCHLORIDE 8 MG/1
8 TABLET, FILM COATED ORAL EVERY 8 HOURS PRN
Qty: 9 TABLET | Refills: 0 | Status: SHIPPED | OUTPATIENT
Start: 2022-08-09

## 2022-08-09 RX ORDER — DEXTROMETHORPHAN HYDROBROMIDE AND PROMETHAZINE HYDROCHLORIDE 15; 6.25 MG/5ML; MG/5ML
5 SYRUP ORAL NIGHTLY PRN
Qty: 118 ML | Refills: 0 | Status: SHIPPED | OUTPATIENT
Start: 2022-08-09

## 2022-08-09 RX ORDER — ALBUTEROL SULFATE 90 UG/1
2 AEROSOL, METERED RESPIRATORY (INHALATION) EVERY 4 HOURS PRN
Qty: 18 G | Refills: 0 | Status: SHIPPED | OUTPATIENT
Start: 2022-08-09

## 2022-08-09 NOTE — PROGRESS NOTES
You have chosen to receive care through a telehealth visit.  Do you consent to use a video/audio connection for your medical care today? Yes     CHIEF COMPLAINT  Cc: shortness of breath, sore throat, headache    HPI  Jazmín Hicks is a 52 y.o. female  presents with complaint of shortness of breath, sore throat and a headache. Additional symptoms that she is having are noted in the ROS portion of this visit. Her symptoms started on  08/07/2022. She is taking ibuprofen, tylenol and tylenol severe cold and sinus for her symptoms.  She does not have a home COVID test. She is vaccinated for COVID via two doses of the COVID  Pfizer vaccine and a booster. She did have a recent hospital stay on 07/27/2022. She was discharged with chest pain unspecified, syncope, nodule of left lung and controlled diabetes. Additionally she has a history of cardiovascular disease.      Review of Systems   Constitutional: Positive for fatigue and fever.   HENT: Positive for congestion, postnasal drip, sinus pressure, sinus pain, sneezing and sore throat. Negative for rhinorrhea and tinnitus.         Change in taste and smell   Respiratory: Positive for chest tightness, shortness of breath and wheezing. Negative for cough.    Cardiovascular: Negative for chest pain.   Gastrointestinal: Positive for diarrhea and nausea. Negative for vomiting.   Musculoskeletal: Positive for myalgias.   Neurological: Positive for weakness and headaches.       Past Medical History:   Diagnosis Date   • Abdominal pain    • Anemia    • Anxiety    • Arthritis    • Back pain    • Bronchitis    • Cardiovascular disease    • Cataract    • Coronary artery disease    • Depression    • Diarrhea     chronic- since kid   • Diverticulitis    • DVT (deep venous thrombosis) (HCC)     left lower calf   • Eczema     stress induced   • Gall stones    • Hashimoto's disease    • Heart attack (HCC)     x5   • History of transfusion     no reaction to blood; age 16   •  Hyperlipidemia    • Hypertension    • Hypothyroidism    • Kidney stone    • Lyme disease    • Migraine headache    • OCD (obsessive compulsive disorder)    • Panic attack    • PCOS (polycystic ovarian syndrome)     takes metformin   • Pituitary macroadenoma (HCC) 07/23/2022   • PNA (pneumonia)    • Seasonal allergies    • Sleep apnea     cpap compliant   • Wears glasses        Family History   Problem Relation Age of Onset   • Cancer Mother         multiple myoloma    • Hypertension Mother    • Thyroid disease Mother    • Hypothyroidism Mother    • Colon polyps Mother    • Heart disease Father    • Hypertension Father    • Deep vein thrombosis Father    • Hypothyroidism Father    • Heart attack Father    • Hypothyroidism Sister    • Hypertension Sister    • Heart disease Sister    • Cancer Sister         colorectal   • Colon polyps Sister    • Heart attack Sister    • Hypothyroidism Brother    • Hypertension Brother    • Heart disease Brother    • Colon polyps Brother    • Heart attack Brother    • Hypothyroidism Daughter    • Scoliosis Daughter    • Depression Daughter    • Hypertension Maternal Grandmother    • Cancer Maternal Grandmother         2 rounds of breast   • No Known Problems Maternal Grandfather    • Diabetes Paternal Grandmother    • Stroke Paternal Grandmother    • Hypertension Paternal Grandmother    • Heart disease Paternal Grandfather    • Heart attack Paternal Grandfather    • Hypothyroidism Sister    • Hypertension Sister    • Colon polyps Sister    • Hypothyroidism Brother    • Hypertension Brother    • Heart disease Brother    • Colon polyps Brother    • Heart attack Brother    • Hypothyroidism Daughter    • Anxiety disorder Daughter    • Depression Daughter        Social History     Socioeconomic History   • Marital status:    Tobacco Use   • Smoking status: Former Smoker     Packs/day: 0.75     Years: 20.00     Pack years: 15.00     Types: Cigarettes     Quit date: 2017     Years  "since quittin.6   • Smokeless tobacco: Never Used   Vaping Use   • Vaping Use: Never used   Substance and Sexual Activity   • Alcohol use: Yes     Comment: wine 3x month   • Drug use: Never   • Sexual activity: Yes     Birth control/protection: I.U.D.     Comment: Carolyn Hicks  reports that she quit smoking about 5 years ago. Her smoking use included cigarettes. She has a 15.00 pack-year smoking history. She has never used smokeless tobacco..       Temp 99.9 °F (37.7 °C) Comment: yesterday  Ht 165.1 cm (65\")   Wt (!) 158 kg (348 lb)   LMP  (LMP Unknown)   Breastfeeding No   BMI 57.91 kg/m²     PHYSICAL EXAM  Physical Exam   Constitutional: She is oriented to person, place, and time. She appears well-developed and well-nourished.   HENT:   Head: Normocephalic and atraumatic.   Right Ear: External ear normal.   Left Ear: External ear normal.   Nose: Congestion present. Right sinus exhibits maxillary sinus tenderness (patient directed exam) and frontal sinus tenderness (patient directed exam). Left sinus exhibits maxillary sinus tenderness (patient directed exam) and frontal sinus tenderness (patient directed exam).   Eyes: Lids are normal. Right eye exhibits no discharge and no exudate. Left eye exhibits no discharge and no exudate. Right conjunctiva is not injected. Left conjunctiva is not injected.   Pulmonary/Chest: No accessory muscle usage. No tachypnea and no bradypnea.  No respiratory distress.No use of oxygen by nasal cannulaNo use of oxygen by mask noted.  Abdominal: Abdomen appears normal.   Neurological: She is alert and oriented to person, place, and time. No cranial nerve deficit.   Skin: Her skin appears normal.  Psychiatric: She has a normal mood and affect. Her speech is normal and behavior is normal. Judgment and thought content normal.       Results for orders placed or performed during the hospital encounter of 22   COVID-19 and FLU A/B PCR - Swab, Nasopharynx "    Specimen: Nasopharynx; Swab   Result Value Ref Range    COVID19 Not Detected Not Detected - Ref. Range    Influenza A PCR Not Detected Not Detected    Influenza B PCR Not Detected Not Detected   Comprehensive Metabolic Panel    Specimen: Blood   Result Value Ref Range    Glucose 148 (H) 65 - 99 mg/dL    BUN 11 6 - 20 mg/dL    Creatinine 0.99 0.57 - 1.00 mg/dL    Sodium 139 136 - 145 mmol/L    Potassium 3.3 (L) 3.5 - 5.2 mmol/L    Chloride 103 98 - 107 mmol/L    CO2 26.0 22.0 - 29.0 mmol/L    Calcium 9.0 8.6 - 10.5 mg/dL    Total Protein 7.0 6.0 - 8.5 g/dL    Albumin 4.00 3.50 - 5.20 g/dL    ALT (SGPT) 35 (H) 1 - 33 U/L    AST (SGOT) 35 (H) 1 - 32 U/L    Alkaline Phosphatase 99 39 - 117 U/L    Total Bilirubin 0.4 0.0 - 1.2 mg/dL    Globulin 3.0 gm/dL    A/G Ratio 1.3 g/dL    BUN/Creatinine Ratio 11.1 7.0 - 25.0    Anion Gap 10.0 5.0 - 15.0 mmol/L    eGFR 69.6 >60.0 mL/min/1.73   BNP    Specimen: Blood   Result Value Ref Range    proBNP 100.5 0.0 - 900.0 pg/mL   Troponin    Specimen: Blood   Result Value Ref Range    Troponin T <0.010 0.000 - 0.030 ng/mL   CBC Auto Differential    Specimen: Blood   Result Value Ref Range    WBC 8.30 3.40 - 10.80 10*3/mm3    RBC 4.81 3.77 - 5.28 10*6/mm3    Hemoglobin 13.3 12.0 - 15.9 g/dL    Hematocrit 41.1 34.0 - 46.6 %    MCV 85.4 79.0 - 97.0 fL    MCH 27.7 26.6 - 33.0 pg    MCHC 32.4 31.5 - 35.7 g/dL    RDW 13.6 12.3 - 15.4 %    RDW-SD 42.4 37.0 - 54.0 fl    MPV 9.9 6.0 - 12.0 fL    Platelets 317 140 - 450 10*3/mm3    Neutrophil % 55.3 42.7 - 76.0 %    Lymphocyte % 36.5 19.6 - 45.3 %    Monocyte % 4.9 (L) 5.0 - 12.0 %    Eosinophil % 1.4 0.3 - 6.2 %    Basophil % 0.8 0.0 - 1.5 %    Immature Grans % 1.1 (H) 0.0 - 0.5 %    Neutrophils, Absolute 4.58 1.70 - 7.00 10*3/mm3    Lymphocytes, Absolute 3.03 0.70 - 3.10 10*3/mm3    Monocytes, Absolute 0.41 0.10 - 0.90 10*3/mm3    Eosinophils, Absolute 0.12 0.00 - 0.40 10*3/mm3    Basophils, Absolute 0.07 0.00 - 0.20 10*3/mm3    Immature  Grans, Absolute 0.09 (H) 0.00 - 0.05 10*3/mm3    nRBC 0.0 0.0 - 0.2 /100 WBC   Troponin    Specimen: Blood   Result Value Ref Range    Troponin T <0.010 0.000 - 0.030 ng/mL   Troponin    Specimen: Blood   Result Value Ref Range    Troponin T <0.010 0.000 - 0.030 ng/mL   Hemoglobin A1c    Specimen: Blood   Result Value Ref Range    Hemoglobin A1C 6.40 (H) 4.80 - 5.60 %   TSH    Specimen: Blood   Result Value Ref Range    TSH 1.990 0.270 - 4.200 uIU/mL   Lipid Panel    Specimen: Blood   Result Value Ref Range    Total Cholesterol 152 0 - 200 mg/dL    Triglycerides 126 0 - 150 mg/dL    HDL Cholesterol 32 (L) 40 - 60 mg/dL    LDL Cholesterol  97 0 - 100 mg/dL    VLDL Cholesterol 23 5 - 40 mg/dL    LDL/HDL Ratio 2.96    Cortisol    Specimen: Blood   Result Value Ref Range    Cortisol 9.36   mcg/dL   Follicle Stimulating Hormone    Specimen: Blood   Result Value Ref Range    FSH 16.40 mIU/mL   ACTH    Specimen: Arm, Right; Blood   Result Value Ref Range    ACTH 16.7 7.2 - 63.3 pg/mL   Insulin-like Growth Factor    Specimen: Arm, Right; Blood   Result Value Ref Range    Insulin-Like Growth Factor-1 114 70 - 225 ng/mL   Prolactin    Specimen: Arm, Right; Blood   Result Value Ref Range    Prolactin 13.20 4.79 - 23.30 ng/mL   Stress Test With Pet Myocardial Perfusion   Result Value Ref Range    Target HR (85%) 145 bpm    Max. Pred. HR (100%) 170 bpm    BH CV STRESS PROTOCOL 1 Pharmacologic     Stage 1 1     Duration Min Stage 1 1     Duration Sec Stage 1 0     Stress Dose Regadenoson Stage 1 0.4     Stress Comments Stage 1 10 sec bolus injection     Stage 2 2     Duration Min Stage 2 1     Duration Sec Stage 2 0     Stage 3 3     Duration Min Stage 3 1     Duration Sec Stage 3 0     Stage 4 4     Duration Min Stage 4 1     Duration Sec Stage 4 0     Baseline HR 84 bpm    Baseline /97 mmHg    O2 sat rest 94 %    HR Stage 1 101     O2 Stage 1 97     HR Stage 2 102     BP Stage 2 148/89     O2 Stage 2 98     HR Stage 3 100      BP Stage 3 144/89     O2 Stage 3 98     HR Stage 4 98     O2 Stage 4 96     Peak  bpm    Percent Max Pred HR 61.18 %    Percent Target HR 72 %    Peak /89 mmHg    O2 sat peak 98 %    Recovery HR 93 bpm    Recovery /86 mmHg    Recovery O2 95 %    Exercise duration (min) 4 min    Exercise duration (sec) 0 sec    Estimated workload 1.0 METS     CV REST NUCLEAR ISOTOPE DOSE 32.9 mCi    BH CV STRESS NUCLEAR ISOTOPE DOSE 32.8 mCi   POC Glucose Once    Specimen: Blood   Result Value Ref Range    Glucose 111 70 - 130 mg/dL   POC Glucose Once    Specimen: Blood   Result Value Ref Range    Glucose 115 70 - 130 mg/dL   POC Glucose Once    Specimen: Blood   Result Value Ref Range    Glucose 120 70 - 130 mg/dL   POC Glucose Once    Specimen: Blood   Result Value Ref Range    Glucose 123 70 - 130 mg/dL   POC Glucose Once    Specimen: Blood   Result Value Ref Range    Glucose 106 70 - 130 mg/dL   POC Glucose Once    Specimen: Blood   Result Value Ref Range    Glucose 141 (H) 70 - 130 mg/dL   ECG 12 Lead   Result Value Ref Range    QT Interval 354 ms    QTC Interval 459 ms   Adult Transthoracic Echo Complete W/ Cont if Necessary Per Protocol   Result Value Ref Range    Target HR (85%) 145 bpm    Max. Pred. HR (100%) 170 bpm    BH CV VAS BP RIGHT /103 mmHg    RV S' 8.5 cm/sec    LA ESV Index (BP) 22.0 ml/m2    Avg E/e' ratio 10.36     Ao root diam 2.7 cm    Ao pk michael 141.0 cm/sec    Ao V2 VTI 30.0 cm    NAHEED(I,D) 3.0 cm2    EDV(cubed) 110.6 ml    EDV(MOD-sp2) 51.1 ml    EDV(MOD-sp4) 94.0 ml    EF(MOD-bp) 64.6 %    EF(MOD-sp2) 61.1 %    EF(MOD-sp4) 66.3 %    ESV(cubed) 35.9 ml    ESV(MOD-sp2) 19.9 ml    ESV(MOD-sp4) 31.7 ml    IVS/LVPW 1.10 cm    Lat Peak E' Michael 9.3 cm/sec    LV mass(C)d 181.9 grams    LV V1 max PG 6.4 mmHg    LV V1 mean PG 3.0 mmHg    LV V1 max 126.0 cm/sec    LVPWd 1.00 cm    Med Peak E' Michael 8.4 cm/sec    MV dec slope 483.0 cm/sec2    MV dec time 0.22 msec    MV P1/2t 61.9 msec    MV  V2 VTI 29.0 cm    MVA(VTI) 3.1 cm2    PA acc time 0.09 sec    PA pr(Accel) 40.8 mmHg    RAP systole 3 mmHg    RVSP(TR) 21 mmHg    SV(LVOT) 89.5 ml    SV(MOD-sp2) 31.2 ml    SV(MOD-sp4) 62.3 ml    TR max PG 18.0 mmHg    Ao max PG 8.0 mmHg    Ao mean PG 4.0 mmHg    FS 31.3 %    IVSd 1.10 cm    LA dimension (2D)  3.2 cm    LV V1 VTI 28.5 cm    LVIDd 4.8 cm    LVIDs 3.3 cm    LVOT area 3.1 cm2    LVOT diam 2.00 cm    MV E/A 1.05     MV max PG 4.1 mmHg    MV mean PG 2.00 mmHg    MVA(P1/2t) 3.6 cm2    MV A max dana 87.5 cm/sec    MV E max dana 91.7 cm/sec    TR max dana 212.3 cm/sec    TAPSE (>1.6) 1.58 cm   Green Top (Gel)   Result Value Ref Range    Extra Tube Hold for add-ons.    Lavender Top   Result Value Ref Range    Extra Tube hold for add-on    Gold Top - SST   Result Value Ref Range    Extra Tube Hold for add-ons.    Gray Top   Result Value Ref Range    Extra Tube Hold for add-ons.    Light Blue Top   Result Value Ref Range    Extra Tube Hold for add-ons.        Diagnoses and all orders for this visit:    1. Suspected COVID-19 virus infection (Primary)  -     COVID-19,[facility] ROUTINE, NP/OP SWAB IN TRANSPORT MEDIA OR ESWAB 72 HR TAT - Swab, Nasopharynx; Future    2. Sore throat  -     COVID-19,[facility] ROUTINE, NP/OP SWAB IN TRANSPORT MEDIA OR ESWAB 72 HR TAT - Swab, Nasopharynx; Future    3. Shortness of breath  -     COVID-19,[facility] ROUTINE, NP/OP SWAB IN TRANSPORT MEDIA OR ESWAB 72 HR TAT - Swab, Nasopharynx; Future    4. Nausea  -     COVID-19,[facility] ROUTINE, NP/OP SWAB IN TRANSPORT MEDIA OR ESWAB 72 HR TAT - Swab, Nasopharynx; Future    5. Nasal congestion  -     COVID-19,[facility] ROUTINE, NP/OP SWAB IN TRANSPORT MEDIA OR ESWAB 72 HR TAT - Swab, Nasopharynx; Future    Other orders  -     albuterol sulfate  (90 Base) MCG/ACT inhaler; Inhale 2 puffs Every 4 (Four) Hours As Needed for Shortness of Air.  Dispense: 18 g; Refill: 0  -     guaiFENesin (Mucinex) 600 MG 12 hr tablet; Take 1 tablet by mouth 2  (Two) Times a Day for 14 days.  Dispense: 28 tablet; Refill: 0  -     promethazine-dextromethorphan (PROMETHAZINE-DM) 6.25-15 MG/5ML syrup; Take 5 mL by mouth At Night As Needed for Cough.  Dispense: 118 mL; Refill: 0  -     ondansetron (Zofran) 8 MG tablet; Take 1 tablet by mouth Every 8 (Eight) Hours As Needed for Nausea.  Dispense: 9 tablet; Refill: 0    Albuterol inhaler as needed for shortness of breath  Zofran as needed for nausea  Do not take zofran and promethazine DM within 4 hours of each other  Do not drive after taking promethazine DM as it may make you drowsy  Mucinex with plenty of fluids especially water to thin secretions and help with congestion.   May alternate tylenol and ibuprofen  Hydrate well  May take vitamins C, D and Zinc  Alternate rest and mild exercise as tolerated  COVID test and results pending. Results will come back in your chart. We will call positives to you  If COVID positive positive additional treatment options discussed with this patient    If you test positive for COVID-19 (isolate)  Regardless of vaccine status                 * Stay home x 5 days              * If you have no symptoms or your symptoms are resolving after 5 days, you can leave your house.              * Continue to wear a mask around other for 5 additional days                             If you have a fever, continue to stay home until your fever resolves.                 If you were exposed to someone with COVID-19 (quarantine)      IF you:                 Have had your booster vaccination                             OR                            Completed the primary series of Pfizer or Moderna vaccination within the last 6 months                 OR                             Completed the primary series of J & J vaccination within the last 2 months                                           * Wear a mask around others for 10 days              * Test on Day 5, if possible.               * If you develop  symptoms, get a test and stay home      IF you:                  Completed the primary series of Pfizer or Moderna vaccination over 6 months ago and have NOT had a booster                 OR                             Completed the Primary series of J & J over 2 months ago and have not received a booster                  OR                  You are unvaccinated                  * Stay home for 5 days. After that continue to wear a mask around others for 5 additional days               * If you can't quarantine, you must wear a mask for 10 days.               * Test on day 5 if possible              * IF you develop symptoms, get a test and stay home.             FOLLOW-UP  If symptoms worsen or persist follow up with PCP.Virtual Care, Urgent Care or ER dependent on severity of symptoms.    Patient verbalizes understanding of medication dosage, comfort measures, instructions for treatment and follow-up.    BONITA Molina  08/09/2022  09:08 EDT    The use of a video visit has been reviewed with the patient and verbal informed consent has been obtained. Myself and Jazmín Hicks participated in this visit. The patient is located in [address] [place] [address] [place] [address].    I am located in [place], [place]. Mychart and Zoom were utilized. I spent 25 minutes in the patient's chart for this visit.

## 2022-08-09 NOTE — TELEPHONE ENCOUNTER
Advise pt to f/u with pcp or urgent care for new symptoms, heart monitor shows normal rhythm, no explanation for symptoms.

## 2022-08-09 NOTE — TELEPHONE ENCOUNTER
[facility] alert for SR./ST where patient reports fainting/falling.  @ 8/9/22 07:09. Attempted to contact patient regarding symptoms. No answer, LVM requesting return call.

## 2022-08-09 NOTE — TELEPHONE ENCOUNTER
Patient returned call. Patient states same symptoms as above however now reports headache, sinus congestion, and sore throat.

## 2022-08-12 ENCOUNTER — TELEMEDICINE (OUTPATIENT)
Dept: FAMILY MEDICINE CLINIC | Facility: TELEHEALTH | Age: 52
End: 2022-08-12

## 2022-08-12 DIAGNOSIS — J40 BRONCHITIS: ICD-10-CM

## 2022-08-12 DIAGNOSIS — J01.40 ACUTE NON-RECURRENT PANSINUSITIS: Primary | ICD-10-CM

## 2022-08-12 PROCEDURE — 99213 OFFICE O/P EST LOW 20 MIN: CPT | Performed by: NURSE PRACTITIONER

## 2022-08-12 RX ORDER — AMOXICILLIN AND CLAVULANATE POTASSIUM 875; 125 MG/1; MG/1
1 TABLET, FILM COATED ORAL 2 TIMES DAILY
Qty: 20 TABLET | Refills: 0 | Status: SHIPPED | OUTPATIENT
Start: 2022-08-12 | End: 2022-08-22

## 2022-08-12 RX ORDER — FLUCONAZOLE 150 MG/1
150 TABLET ORAL
Qty: 3 TABLET | Refills: 0 | Status: SHIPPED | OUTPATIENT
Start: 2022-08-12

## 2022-08-12 NOTE — PATIENT INSTRUCTIONS
Sinusitis, Adult  Sinusitis is inflammation of your sinuses. Sinuses are hollow spaces in the bones around your face. Your sinuses are located:  Around your eyes.  In the middle of your forehead.  Behind your nose.  In your cheekbones.  Mucus normally drains out of your sinuses. When your nasal tissues become inflamed or swollen, mucus can become trapped or blocked. This allows bacteria, viruses, and fungi to grow, which leads to infection. Most infections of the sinuses are caused by a virus.  Sinusitis can develop quickly. It can last for up to 4 weeks (acute) or for more than 12 weeks (chronic). Sinusitis often develops after a cold.  What are the causes?  This condition is caused by anything that creates swelling in the sinuses or stops mucus from draining. This includes:  Allergies.  Asthma.  Infection from bacteria or viruses.  Deformities or blockages in your nose or sinuses.  Abnormal growths in the nose (nasal polyps).  Pollutants, such as chemicals or irritants in the air.  Infection from fungi (rare).  What increases the risk?  You are more likely to develop this condition if you:  Have a weak body defense system (immune system).  Do a lot of swimming or diving.  Overuse nasal sprays.  Smoke.  What are the signs or symptoms?  The main symptoms of this condition are pain and a feeling of pressure around the affected sinuses. Other symptoms include:  Stuffy nose or congestion.  Thick drainage from your nose.  Swelling and warmth over the affected sinuses.  Headache.  Upper toothache.  A cough that may get worse at night.  Extra mucus that collects in the throat or the back of the nose (postnasal drip).  Decreased sense of smell and taste.  Fatigue.  A fever.  Sore throat.  Bad breath.  How is this diagnosed?  This condition is diagnosed based on:  Your symptoms.  Your medical history.  A physical exam.  Tests to find out if your condition is acute or chronic. This may include:  Checking your nose for nasal  polyps.  Viewing your sinuses using a device that has a light (endoscope).  Testing for allergies or bacteria.  Imaging tests, such as an MRI or CT scan.  In rare cases, a bone biopsy may be done to rule out more serious types of fungal sinus disease.  How is this treated?  Treatment for sinusitis depends on the cause and whether your condition is chronic or acute.  If caused by a virus, your symptoms should go away on their own within 10 days. You may be given medicines to relieve symptoms. They include:  Medicines that shrink swollen nasal passages (topical intranasal decongestants).  Medicines that treat allergies (antihistamines).  A spray that eases inflammation of the nostrils (topical intranasal corticosteroids).  Rinses that help get rid of thick mucus in your nose (nasal saline washes).  If caused by bacteria, your health care provider may recommend waiting to see if your symptoms improve. Most bacterial infections will get better without antibiotic medicine. You may be given antibiotics if you have:  A severe infection.  A weak immune system.  If caused by narrow nasal passages or nasal polyps, you may need to have surgery.  Follow these instructions at home:  Medicines  Take, use, or apply over-the-counter and prescription medicines only as told by your health care provider. These may include nasal sprays.  If you were prescribed an antibiotic medicine, take it as told by your health care provider. Do not stop taking the antibiotic even if you start to feel better.  Hydrate and humidify    Drink enough fluid to keep your urine pale yellow. Staying hydrated will help to thin your mucus.  Use a cool mist humidifier to keep the humidity level in your home above 50%.  Inhale steam for 10-15 minutes, 3-4 times a day, or as told by your health care provider. You can do this in the bathroom while a hot shower is running.  Limit your exposure to cool or dry air.    Rest  Rest as much as possible.  Sleep with your  head raised (elevated).  Make sure you get enough sleep each night.  General instructions    Apply a warm, moist washcloth to your face 3-4 times a day or as told by your health care provider. This will help with discomfort.  Wash your hands often with soap and water to reduce your exposure to germs. If soap and water are not available, use hand .  Do not smoke. Avoid being around people who are smoking (secondhand smoke).  Keep all follow-up visits as told by your health care provider. This is important.    Contact a health care provider if:  You have a fever.  Your symptoms get worse.  Your symptoms do not improve within 10 days.  Get help right away if:  You have a severe headache.  You have persistent vomiting.  You have severe pain or swelling around your face or eyes.  You have vision problems.  You develop confusion.  Your neck is stiff.  You have trouble breathing.  Summary  Sinusitis is soreness and inflammation of your sinuses. Sinuses are hollow spaces in the bones around your face.  This condition is caused by nasal tissues that become inflamed or swollen. The swelling traps or blocks the flow of mucus. This allows bacteria, viruses, and fungi to grow, which leads to infection.  If you were prescribed an antibiotic medicine, take it as told by your health care provider. Do not stop taking the antibiotic even if you start to feel better.  Keep all follow-up visits as told by your health care provider. This is important.  This information is not intended to replace advice given to you by your health care provider. Make sure you discuss any questions you have with your health care provider.  Document Revised: 05/20/2019 Document Reviewed: 05/20/2019  [employer] Patient Education © 2021 [employer]

## 2022-08-12 NOTE — PROGRESS NOTES
HPI  Jazmín Hicks is a 52 y.o. female  presents with complaint of fever up to 101.7. Symptoms initially started on 8/7 with cough, congestion, sinus pressure, hoarseness. Tested negative for covid on 8/9. Was prescribed albuterol inhaler, mucinex, promethazine DM (helped with sleep) and zofran. Also using honey and lemon tea.   Has a history of lung nodules and has a follow-up scan of in September. History of autoimmune disorders. She needs new work excuse to return on Monday 8/15.     Review of Systems    Past Medical History:   Diagnosis Date   • Abdominal pain    • Anemia    • Anxiety    • Arthritis    • Back pain    • Bronchitis    • Cardiovascular disease    • Cataract    • Coronary artery disease    • Depression    • Diarrhea     chronic- since kid   • Diverticulitis    • DVT (deep venous thrombosis) (HCC)     left lower calf   • Eczema     stress induced   • Gall stones    • Hashimoto's disease    • Heart attack (HCC)     x5   • History of transfusion     no reaction to blood; age 16   • Hyperlipidemia    • Hypertension    • Hypothyroidism    • Kidney stone    • Lyme disease    • Migraine headache    • OCD (obsessive compulsive disorder)    • Panic attack    • PCOS (polycystic ovarian syndrome)     takes metformin   • Pituitary macroadenoma (HCC) 07/23/2022   • PNA (pneumonia)    • Seasonal allergies    • Sleep apnea     cpap compliant   • Wears glasses        Family History   Problem Relation Age of Onset   • Cancer Mother         multiple myoloma    • Hypertension Mother    • Thyroid disease Mother    • Hypothyroidism Mother    • Colon polyps Mother    • Heart disease Father    • Hypertension Father    • Deep vein thrombosis Father    • Hypothyroidism Father    • Heart attack Father    • Hypothyroidism Sister    • Hypertension Sister    • Heart disease Sister    • Cancer Sister         colorectal   • Colon polyps Sister    • Heart attack Sister    • Hypothyroidism Brother    • Hypertension Brother     • Heart disease Brother    • Colon polyps Brother    • Heart attack Brother    • Hypothyroidism Daughter    • Scoliosis Daughter    • Depression Daughter    • Hypertension Maternal Grandmother    • Cancer Maternal Grandmother         2 rounds of breast   • No Known Problems Maternal Grandfather    • Diabetes Paternal Grandmother    • Stroke Paternal Grandmother    • Hypertension Paternal Grandmother    • Heart disease Paternal Grandfather    • Heart attack Paternal Grandfather    • Hypothyroidism Sister    • Hypertension Sister    • Colon polyps Sister    • Hypothyroidism Brother    • Hypertension Brother    • Heart disease Brother    • Colon polyps Brother    • Heart attack Brother    • Hypothyroidism Daughter    • Anxiety disorder Daughter    • Depression Daughter        Social History     Socioeconomic History   • Marital status:    Tobacco Use   • Smoking status: Former Smoker     Packs/day: 0.75     Years: 20.00     Pack years: 15.00     Types: Cigarettes     Quit date:      Years since quittin.6   • Smokeless tobacco: Never Used   Vaping Use   • Vaping Use: Never used   Substance and Sexual Activity   • Alcohol use: Yes     Comment: wine 3x month   • Drug use: Never   • Sexual activity: Yes     Birth control/protection: I.U.D.     Comment: Mirena         LMP  (LMP Unknown)     PHYSICAL EXAM  Physical Exam   Constitutional: She appears well-developed and well-nourished.   Hoarseness   HENT:   Head: Normocephalic.   Nose: Rhinorrhea present. Right sinus exhibits maxillary sinus tenderness and frontal sinus tenderness. Left sinus exhibits maxillary sinus tenderness and frontal sinus tenderness.   Occasional cough during visit   Neck: Neck normal appearance.  Pulmonary/Chest: Effort normal.   Neurological: She is alert.   Psychiatric: She has a normal mood and affect. Her speech is normal.       Diagnoses and all orders for this visit:    1. Acute non-recurrent pansinusitis (Primary)  -      amoxicillin-clavulanate (Augmentin) 875-125 MG per tablet; Take 1 tablet by mouth 2 (Two) Times a Day for 10 days.  Dispense: 20 tablet; Refill: 0  -     fluconazole (Diflucan) 150 MG tablet; Take 1 tablet by mouth Every 72 (Seventy-Two) Hours As Needed (yeast).  Dispense: 3 tablet; Refill: 0    2. Bronchitis          FOLLOW-UP  As discussed during visit with [facility], if symptoms worsen or fail to improve, follow-up with PCP/Urgent Care/Emergency Department.    Patient verbalizes understanding of medications, instructions for treatment and follow-up.    Michelle Blue, APRN  08/12/2022  08:42 EDT    The use of a video visit has been reviewed with the patient and verbal informed consent has been obtained. Myself and Jazmín Hicks participated in this visit. The patient is located in [place], [place], and I am located in [place], [place]. MyChart and Zoom were utilized.

## 2022-08-19 ENCOUNTER — HOSPITAL ENCOUNTER (OUTPATIENT)
Dept: SLEEP MEDICINE | Facility: HOSPITAL | Age: 52
Discharge: HOME | End: 2022-08-19
Admitting: NURSE PRACTITIONER

## 2022-08-19 DIAGNOSIS — G47.33 OSA (OBSTRUCTIVE SLEEP APNEA): ICD-10-CM

## 2022-08-19 PROCEDURE — 95800 SLP STDY UNATTENDED: CPT

## 2022-08-19 PROCEDURE — 95800 SLP STDY UNATTENDED: CPT | Performed by: INTERNAL MEDICINE

## 2022-08-22 VITALS — BODY MASS INDEX: 48.82 KG/M2 | HEIGHT: 65 IN | WEIGHT: 293 LBS

## 2022-08-29 DIAGNOSIS — G47.33 OSA (OBSTRUCTIVE SLEEP APNEA): Primary | ICD-10-CM

## 2022-09-02 ENCOUNTER — TELEPHONE (OUTPATIENT)
Dept: CARDIOLOGY | Facility: CLINIC | Age: 52
End: 2022-09-02

## 2022-09-02 ENCOUNTER — HOSPITAL ENCOUNTER (OUTPATIENT)
Dept: MRI IMAGING | Facility: HOSPITAL | Age: 52
Discharge: HOME | End: 2022-09-02

## 2022-09-02 ENCOUNTER — TELEPHONE (OUTPATIENT)
Dept: FAMILY MEDICINE CLINIC | Facility: CLINIC | Age: 52
End: 2022-09-02

## 2022-09-02 DIAGNOSIS — R91.1 NODULE OF LEFT LUNG: ICD-10-CM

## 2022-09-02 NOTE — TELEPHONE ENCOUNTER
----- Message from Pratik White MD sent at 9/2/2022  2:21 PM EDT -----  Please inform the patient of their test results. Thank you.

## 2022-09-02 NOTE — TELEPHONE ENCOUNTER
PT WAS SCHEDULED FOR MRI CHEST TODAY, BUT HAD TO CANCEL DUE TO INCORRECT ORDER. RADIOLOGY REQUESTING A CT CHEST WITH CONTRAST PLEASE.

## 2022-09-08 ENCOUNTER — APPOINTMENT (OUTPATIENT)
Dept: CT IMAGING | Facility: HOSPITAL | Age: 52
End: 2022-09-08

## 2022-09-08 ENCOUNTER — HOSPITAL ENCOUNTER (EMERGENCY)
Facility: HOSPITAL | Age: 52
Discharge: HOME | End: 2022-09-08
Attending: EMERGENCY MEDICINE | Admitting: EMERGENCY MEDICINE

## 2022-09-08 VITALS
DIASTOLIC BLOOD PRESSURE: 89 MMHG | TEMPERATURE: 97.9 F | HEIGHT: 65 IN | WEIGHT: 293 LBS | HEART RATE: 93 BPM | SYSTOLIC BLOOD PRESSURE: 140 MMHG | BODY MASS INDEX: 48.82 KG/M2 | OXYGEN SATURATION: 97 % | RESPIRATION RATE: 17 BRPM

## 2022-09-08 DIAGNOSIS — G43.809 OTHER MIGRAINE WITHOUT STATUS MIGRAINOSUS, NOT INTRACTABLE: Primary | ICD-10-CM

## 2022-09-08 LAB
ALBUMIN SERPL-MCNC: 4.4 G/DL (ref 3.5–5.2)
ALBUMIN/GLOB SERPL: 1.5 G/DL
ALP SERPL-CCNC: 103 U/L (ref 39–117)
ALT SERPL W P-5'-P-CCNC: 36 U/L (ref 1–33)
ANION GAP SERPL CALCULATED.3IONS-SCNC: 12 MMOL/L (ref 5–15)
AST SERPL-CCNC: 41 U/L (ref 1–32)
BASOPHILS # BLD AUTO: 0.05 10*3/MM3 (ref 0–0.2)
BASOPHILS NFR BLD AUTO: 0.7 % (ref 0–1.5)
BILIRUB SERPL-MCNC: 0.4 MG/DL (ref 0–1.2)
BUN SERPL-MCNC: 14 MG/DL (ref 6–20)
BUN/CREAT SERPL: 15.2 (ref 7–25)
CALCIUM SPEC-SCNC: 9.9 MG/DL (ref 8.6–10.5)
CHLORIDE SERPL-SCNC: 103 MMOL/L (ref 98–107)
CO2 SERPL-SCNC: 26 MMOL/L (ref 22–29)
CREAT SERPL-MCNC: 0.92 MG/DL (ref 0.57–1)
DEPRECATED RDW RBC AUTO: 41.8 FL (ref 37–54)
EGFRCR SERPLBLD CKD-EPI 2021: 76 ML/MIN/1.73
EOSINOPHIL # BLD AUTO: 0.14 10*3/MM3 (ref 0–0.4)
EOSINOPHIL NFR BLD AUTO: 1.8 % (ref 0.3–6.2)
ERYTHROCYTE [DISTWIDTH] IN BLOOD BY AUTOMATED COUNT: 13.6 % (ref 12.3–15.4)
GLOBULIN UR ELPH-MCNC: 3 GM/DL
GLUCOSE SERPL-MCNC: 159 MG/DL (ref 65–99)
HCT VFR BLD AUTO: 40.6 % (ref 34–46.6)
HGB BLD-MCNC: 13.5 G/DL (ref 12–15.9)
HOLD SPECIMEN: NORMAL
IMM GRANULOCYTES # BLD AUTO: 0.02 10*3/MM3 (ref 0–0.05)
IMM GRANULOCYTES NFR BLD AUTO: 0.3 % (ref 0–0.5)
LYMPHOCYTES # BLD AUTO: 2.98 10*3/MM3 (ref 0.7–3.1)
LYMPHOCYTES NFR BLD AUTO: 39.2 % (ref 19.6–45.3)
MCH RBC QN AUTO: 28.1 PG (ref 26.6–33)
MCHC RBC AUTO-ENTMCNC: 33.3 G/DL (ref 31.5–35.7)
MCV RBC AUTO: 84.6 FL (ref 79–97)
MONOCYTES # BLD AUTO: 0.28 10*3/MM3 (ref 0.1–0.9)
MONOCYTES NFR BLD AUTO: 3.7 % (ref 5–12)
NEUTROPHILS NFR BLD AUTO: 4.14 10*3/MM3 (ref 1.7–7)
NEUTROPHILS NFR BLD AUTO: 54.3 % (ref 42.7–76)
NRBC BLD AUTO-RTO: 0 /100 WBC (ref 0–0.2)
PLATELET # BLD AUTO: 330 10*3/MM3 (ref 140–450)
PMV BLD AUTO: 10.5 FL (ref 6–12)
POTASSIUM SERPL-SCNC: 3.7 MMOL/L (ref 3.5–5.2)
PROT SERPL-MCNC: 7.4 G/DL (ref 6–8.5)
RBC # BLD AUTO: 4.8 10*6/MM3 (ref 3.77–5.28)
SODIUM SERPL-SCNC: 141 MMOL/L (ref 136–145)
WBC NRBC COR # BLD: 7.61 10*3/MM3 (ref 3.4–10.8)
WHOLE BLOOD HOLD COAG: NORMAL
WHOLE BLOOD HOLD SPECIMEN: NORMAL

## 2022-09-08 PROCEDURE — 80053 COMPREHEN METABOLIC PANEL: CPT | Performed by: NURSE PRACTITIONER

## 2022-09-08 PROCEDURE — 25010000002 KETOROLAC TROMETHAMINE PER 15 MG: Performed by: NURSE PRACTITIONER

## 2022-09-08 PROCEDURE — 96374 THER/PROPH/DIAG INJ IV PUSH: CPT

## 2022-09-08 PROCEDURE — 70450 CT HEAD/BRAIN W/O DYE: CPT

## 2022-09-08 PROCEDURE — 25010000002 METOCLOPRAMIDE PER 10 MG: Performed by: NURSE PRACTITIONER

## 2022-09-08 PROCEDURE — 85025 COMPLETE CBC W/AUTO DIFF WBC: CPT | Performed by: NURSE PRACTITIONER

## 2022-09-08 PROCEDURE — 25010000002 DIPHENHYDRAMINE PER 50 MG: Performed by: NURSE PRACTITIONER

## 2022-09-08 PROCEDURE — 96375 TX/PRO/DX INJ NEW DRUG ADDON: CPT

## 2022-09-08 PROCEDURE — 99283 EMERGENCY DEPT VISIT LOW MDM: CPT

## 2022-09-08 RX ORDER — SODIUM CHLORIDE 0.9 % (FLUSH) 0.9 %
10 SYRINGE (ML) INJECTION AS NEEDED
Status: DISCONTINUED | OUTPATIENT
Start: 2022-09-08 | End: 2022-09-08 | Stop reason: HOSPADM

## 2022-09-08 RX ORDER — KETOROLAC TROMETHAMINE 15 MG/ML
15 INJECTION, SOLUTION INTRAMUSCULAR; INTRAVENOUS ONCE
Status: COMPLETED | OUTPATIENT
Start: 2022-09-08 | End: 2022-09-08

## 2022-09-08 RX ORDER — METOCLOPRAMIDE HYDROCHLORIDE 5 MG/ML
10 INJECTION INTRAMUSCULAR; INTRAVENOUS ONCE
Status: COMPLETED | OUTPATIENT
Start: 2022-09-08 | End: 2022-09-08

## 2022-09-08 RX ORDER — DIPHENHYDRAMINE HYDROCHLORIDE 50 MG/ML
25 INJECTION INTRAMUSCULAR; INTRAVENOUS ONCE
Status: COMPLETED | OUTPATIENT
Start: 2022-09-08 | End: 2022-09-08

## 2022-09-08 RX ADMIN — METOCLOPRAMIDE 10 MG: 5 INJECTION, SOLUTION INTRAMUSCULAR; INTRAVENOUS at 15:43

## 2022-09-08 RX ADMIN — SODIUM CHLORIDE 1000 ML: 9 INJECTION, SOLUTION INTRAVENOUS at 15:43

## 2022-09-08 RX ADMIN — DIPHENHYDRAMINE HYDROCHLORIDE 25 MG: 50 INJECTION INTRAMUSCULAR; INTRAVENOUS at 15:43

## 2022-09-08 RX ADMIN — KETOROLAC TROMETHAMINE 15 MG: 15 INJECTION, SOLUTION INTRAMUSCULAR; INTRAVENOUS at 15:43

## 2022-09-08 NOTE — DISCHARGE INSTRUCTIONS
Home to rest.  Do not drive today.  Resume your usual medication regimen.  Follow-up with Isabel Hidalgo to monitor your recovery.  Return to the emergency department as needed for worsening symptoms or concerns.  Thank you

## 2022-09-09 NOTE — ED PROVIDER NOTES
" EMERGENCY DEPARTMENT ENCOUNTER    Pt Name: Jazmín Hicks  MRN: [id number]  Pt :   1970  Room Number:    Date of encounter:  2022  PCP: Isabel Hidalgo APRN  ED Provider: BONITA White    Historian: Patient      HPI:  Chief Complaint: Migraine headache        Context: Jazmín Hicks is a 52 y.o. female who presents to the ED c/o migraine headache that she recognizes is similar in presentation in intensity to previous and occasional migraine headache.  Patient states that she is on the third day of symptoms without response to home measures.  Patient states she went to a urgent treatment center today who advised her to seek emergency care due to her symptom constellation: Patient states her vision in her left eye is changed and she has right eye blurriness but this is \"normal for me\".  Patient adds that she has a history of recent pituitary tumor diagnosis that is being observed.  Patient denies any neck pain.    Review of systems is negative for fever or chills or recent illness.  Positive for recent fall on Monday with possible mild head injury.  No loss of consciousness.  Negative for chest pain or cough or shortness of breath.  GI: Positive for nausea without vomiting.  No abdominal pain or diarrhea.   systems are negative.  No profound weakness, dizziness or syncope.  No neurosensory complaint or focal weakness      PAST MEDICAL HISTORY  Past Medical History:   Diagnosis Date   • Abdominal pain    • Anemia    • Anxiety    • Arthritis    • Back pain    • Bronchitis    • Cardiovascular disease    • Cataract    • Coronary artery disease    • Depression    • Diarrhea     chronic- since kid   • Diverticulitis    • DVT (deep venous thrombosis) (HCC)     left lower calf   • Eczema     stress induced   • Gall stones    • Hashimoto's disease    • Heart attack (HCC)     x5   • History of transfusion     no reaction to blood; age 16   • Hyperlipidemia    • Hypertension    • " Hypothyroidism    • Kidney stone    • Lung nodules     Due to COVID   • Lyme disease    • Migraine headache    • OCD (obsessive compulsive disorder)    • Panic attack    • PCOS (polycystic ovarian syndrome)     takes metformin   • Pituitary macroadenoma (HCC) 07/23/2022   • PNA (pneumonia)    • Seasonal allergies    • Sleep apnea     cpap compliant   • Wears glasses          PAST SURGICAL HISTORY  Past Surgical History:   Procedure Laterality Date   • BILATERAL BREAST REDUCTION     • CARDIAC CATHETERIZATION  10/2019   • CHOLECYSTECTOMY     • COLONOSCOPY N/A 8/25/2021    Procedure: Colonoscopy with polypectomy;  Surgeon: Lamont Rm MD;  Location: [facility] ENDOSCOPY;  Service: Gastroenterology;  Laterality: N/A;   • KNEE SURGERY Right     scopy   • TONSILLECTOMY           FAMILY HISTORY  Family History   Problem Relation Age of Onset   • Cancer Mother         multiple myoloma    • Hypertension Mother    • Thyroid disease Mother    • Hypothyroidism Mother    • Colon polyps Mother    • Heart disease Father    • Hypertension Father    • Deep vein thrombosis Father    • Hypothyroidism Father    • Heart attack Father    • Hypothyroidism Sister    • Hypertension Sister    • Heart disease Sister    • Cancer Sister         colorectal   • Colon polyps Sister    • Heart attack Sister    • Hypothyroidism Brother    • Hypertension Brother    • Heart disease Brother    • Colon polyps Brother    • Heart attack Brother    • Hypothyroidism Daughter    • Scoliosis Daughter    • Depression Daughter    • Hypertension Maternal Grandmother    • Cancer Maternal Grandmother         2 rounds of breast   • No Known Problems Maternal Grandfather    • Diabetes Paternal Grandmother    • Stroke Paternal Grandmother    • Hypertension Paternal Grandmother    • Heart disease Paternal Grandfather    • Heart attack Paternal Grandfather    • Hypothyroidism Sister    • Hypertension Sister    • Colon polyps Sister    • Hypothyroidism Brother     • Hypertension Brother    • Heart disease Brother    • Colon polyps Brother    • Heart attack Brother    • Hypothyroidism Daughter    • Anxiety disorder Daughter    • Depression Daughter          SOCIAL HISTORY  Social History     Socioeconomic History   • Marital status:    Tobacco Use   • Smoking status: Former Smoker     Packs/day: 0.75     Years: 20.00     Pack years: 15.00     Types: Cigarettes     Quit date:      Years since quittin.6   • Smokeless tobacco: Never Used   Vaping Use   • Vaping Use: Never used   Substance and Sexual Activity   • Alcohol use: Yes     Comment: wine 3x month   • Drug use: Never   • Sexual activity: Yes     Birth control/protection: I.U.D.     Comment: Mirena         ALLERGIES  Capsaicin, Adhesive tape, Darvon [propoxyphene], and Lisinopril        REVIEW OF SYSTEMS  Review of Systems     All systems reviewed and negative except for those discussed in HPI.       PHYSICAL EXAM    I have reviewed the triage vital signs and nursing notes.    ED Triage Vitals [22 1221]   Temp Heart Rate Resp BP SpO2   97.9 °F (36.6 °C) 93 17 140/89 97 %      Temp src Heart Rate Source Patient Position BP Location FiO2 (%)   Oral Monitor Sitting Left arm --       Physical Exam  GENERAL:   Appears in no acute distress.  She is verbose.  Her vital signs are normal.  She is an excellent historian.  Triage tachycardia is resolved.  HENT: Nares patent.  EYES: No scleral icterus.  CV: Regular rhythm, regular rate.  No tachycardia.  No peripheral edema  RESPIRATORY: Normal effort.  No audible wheezes, rales or rhonchi.  ABDOMEN: Soft, nontender  MUSCULOSKELETAL: No deformities.   NEURO: Alert, moves all extremities, follows commands.  No photophobia or meningeal signs.  No profound weakness, dizziness or syncope.  No neurosensory deficit or focal weakness  SKIN: Warm, dry, no rash visualized.        LAB RESULTS  Recent Results (from the past 24 hour(s))   Green Top (Gel)    Collection  Time: 09/08/22 12:43 PM   Result Value Ref Range    Extra Tube Hold for add-ons.    Lavender Top    Collection Time: 09/08/22 12:43 PM   Result Value Ref Range    Extra Tube hold for add-on    Gold Top - SST    Collection Time: 09/08/22 12:43 PM   Result Value Ref Range    Extra Tube Hold for add-ons.    Gray Top    Collection Time: 09/08/22 12:43 PM   Result Value Ref Range    Extra Tube Hold for add-ons.    Light Blue Top    Collection Time: 09/08/22 12:43 PM   Result Value Ref Range    Extra Tube Hold for add-ons.    Comprehensive Metabolic Panel    Collection Time: 09/08/22 12:43 PM    Specimen: Blood   Result Value Ref Range    Glucose 159 (H) 65 - 99 mg/dL    BUN 14 6 - 20 mg/dL    Creatinine 0.92 0.57 - 1.00 mg/dL    Sodium 141 136 - 145 mmol/L    Potassium 3.7 3.5 - 5.2 mmol/L    Chloride 103 98 - 107 mmol/L    CO2 26.0 22.0 - 29.0 mmol/L    Calcium 9.9 8.6 - 10.5 mg/dL    Total Protein 7.4 6.0 - 8.5 g/dL    Albumin 4.40 3.50 - 5.20 g/dL    ALT (SGPT) 36 (H) 1 - 33 U/L    AST (SGOT) 41 (H) 1 - 32 U/L    Alkaline Phosphatase 103 39 - 117 U/L    Total Bilirubin 0.4 0.0 - 1.2 mg/dL    Globulin 3.0 gm/dL    A/G Ratio 1.5 g/dL    BUN/Creatinine Ratio 15.2 7.0 - 25.0    Anion Gap 12.0 5.0 - 15.0 mmol/L    eGFR 76.0 >60.0 mL/min/1.73   CBC Auto Differential    Collection Time: 09/08/22 12:43 PM    Specimen: Blood   Result Value Ref Range    WBC 7.61 3.40 - 10.80 10*3/mm3    RBC 4.80 3.77 - 5.28 10*6/mm3    Hemoglobin 13.5 12.0 - 15.9 g/dL    Hematocrit 40.6 34.0 - 46.6 %    MCV 84.6 79.0 - 97.0 fL    MCH 28.1 26.6 - 33.0 pg    MCHC 33.3 31.5 - 35.7 g/dL    RDW 13.6 12.3 - 15.4 %    RDW-SD 41.8 37.0 - 54.0 fl    MPV 10.5 6.0 - 12.0 fL    Platelets 330 140 - 450 10*3/mm3    Neutrophil % 54.3 42.7 - 76.0 %    Lymphocyte % 39.2 19.6 - 45.3 %    Monocyte % 3.7 (L) 5.0 - 12.0 %    Eosinophil % 1.8 0.3 - 6.2 %    Basophil % 0.7 0.0 - 1.5 %    Immature Grans % 0.3 0.0 - 0.5 %    Neutrophils, Absolute 4.14 1.70 - 7.00  10*3/mm3    Lymphocytes, Absolute 2.98 0.70 - 3.10 10*3/mm3    Monocytes, Absolute 0.28 0.10 - 0.90 10*3/mm3    Eosinophils, Absolute 0.14 0.00 - 0.40 10*3/mm3    Basophils, Absolute 0.05 0.00 - 0.20 10*3/mm3    Immature Grans, Absolute 0.02 0.00 - 0.05 10*3/mm3    nRBC 0.0 0.0 - 0.2 /100 WBC       If labs were ordered, I independently reviewed the results.        RADIOLOGY  CT Head Without Contrast    Result Date: 9/8/2022  DATE OF EXAM: 9/8/2022 3:51 PM  PROCEDURE: CT HEAD WO CONTRAST-  INDICATIONS: unusual migraine; hx of pituitary tumor  COMPARISON: Head CT 7/23/2022, brain MRI 7/24/2022  TECHNIQUE: Routine transaxial and coronal reconstruction images were obtained through the head without the administration of contrast. Automated exposure control and iterative reconstruction methods were used.  The radiation dose reduction device was turned on for each scan per the ALARA (As Low as Reasonably Achievable) protocol.  FINDINGS: No acute intracranial hemorrhage. No acute large territory infarct. The gray-white differentiation is grossly preserved. Unchanged CT appearance of a pituitary macroadenoma with extension into the sphenoid sinus (series 4 image 17), better assessed on prior brain MRI. No extra-axial collections. No midline shift or herniation. Normal size and configuration of the ventricles. Normal appearance of the orbits. The paranasal sinuses and mastoid air cells are clear. No acute osseous findings.      No acute intracranial findings.  Stable CT appearance of known pituitary macroadenoma extending into the sphenoid sinus, better assessed on prior brain MRI.  This report was finalized on 9/8/2022 4:08 PM by Helio Castillo MD.        PROCEDURES    Procedures    No orders to display       MEDICATIONS GIVEN IN ER    Medications   sodium chloride 0.9 % bolus 1,000 mL (0 mL Intravenous Stopped 9/8/22 4316)   metoclopramide (REGLAN) injection 10 mg (10 mg Intravenous Given 9/8/22 3403)   diphenhydrAMINE  (BENADRYL) injection 25 mg (25 mg Intravenous Given 9/8/22 1543)   ketorolac (TORADOL) injection 15 mg (15 mg Intravenous Given 9/8/22 1543)         ED Course as of 09/09/22 0950   Thu Sep 08, 2022   1620 Patient's work-up is reassuring.  She is feeling restored and pain-free.  Her vital signs of been stable throughout her ED course.  We discussed parameters for concern that would warrant return to the emergency department.  Ms. Hicks understands and concurs with this outpatient plan of care and close follow-up [MS]      ED Course User Index  [MS] Shona Bianchi APRN             AS OF 09:50 EDT VITALS:    BP - 140/89  HR - 93  TEMP - 97.9 °F (36.6 °C) (Oral)  O2 SATS - 97%                  DIAGNOSIS  Final diagnoses:   Other migraine without status migrainosus, not intractable         DISPOSITION    DISCHARGE    Patient discharged in stable condition.    Reviewed implications of results, diagnosis, meds, responsibility to follow up, warning signs and symptoms of possible worsening, potential complications and reasons to return to ER.    Patient/Family voiced understanding of above instructions.    Discussed plan for discharge, as there is no emergent indication for admission.  Pt/family is agreeable and understands need for follow up and possible repeat testing.  Pt/family is aware that discharge does not mean that nothing is wrong but that it indicates no emergency is currently present that requires admission and they must continue care with follow-up as given below or with a physician of their choice.     FOLLOW-UP  Isabel Hidalgo, BONITA  [address] [address] [phone]    In 2 days  If symptoms worsen         Medication List      No changes were made to your prescriptions during this visit.                  Shona Bianchi, BONITA  09/09/22 0931

## 2022-09-19 ENCOUNTER — HOSPITAL ENCOUNTER (OUTPATIENT)
Dept: CARDIOLOGY | Facility: HOSPITAL | Age: 52
Discharge: HOME | End: 2022-09-19
Admitting: NURSE PRACTITIONER

## 2022-09-19 DIAGNOSIS — R55 SYNCOPE, UNSPECIFIED SYNCOPE TYPE: ICD-10-CM

## 2022-09-19 PROCEDURE — 93880 EXTRACRANIAL BILAT STUDY: CPT | Performed by: INTERNAL MEDICINE

## 2022-09-19 PROCEDURE — 93880 EXTRACRANIAL BILAT STUDY: CPT

## 2022-09-20 LAB
BH CV XLRA MEAS LEFT DIST CCA EDV: 29.5 CM/SEC
BH CV XLRA MEAS LEFT DIST CCA PSV: 84.9 CM/SEC
BH CV XLRA MEAS LEFT DIST ICA EDV: 32.4 CM/SEC
BH CV XLRA MEAS LEFT DIST ICA PSV: 80.7 CM/SEC
BH CV XLRA MEAS LEFT ICA/CCA RATIO: 0.87
BH CV XLRA MEAS LEFT MID CCA EDV: 25.1 CM/SEC
BH CV XLRA MEAS LEFT MID CCA PSV: 92.7 CM/SEC
BH CV XLRA MEAS LEFT MID ICA EDV: 24.9 CM/SEC
BH CV XLRA MEAS LEFT MID ICA PSV: 69.6 CM/SEC
BH CV XLRA MEAS LEFT PROX CCA EDV: 28.6 CM/SEC
BH CV XLRA MEAS LEFT PROX CCA PSV: 116 CM/SEC
BH CV XLRA MEAS LEFT PROX ECA EDV: 9.3 CM/SEC
BH CV XLRA MEAS LEFT PROX ECA PSV: 62.1 CM/SEC
BH CV XLRA MEAS LEFT PROX ICA EDV: 29.2 CM/SEC
BH CV XLRA MEAS LEFT PROX ICA PSV: 75.7 CM/SEC
BH CV XLRA MEAS LEFT PROX SCLA PSV: 125 CM/SEC
BH CV XLRA MEAS LEFT VERTEBRAL A EDV: 8.8 CM/SEC
BH CV XLRA MEAS LEFT VERTEBRAL A PSV: 41.7 CM/SEC
BH CV XLRA MEAS RIGHT DIST CCA EDV: 29.4 CM/SEC
BH CV XLRA MEAS RIGHT DIST CCA PSV: 82.7 CM/SEC
BH CV XLRA MEAS RIGHT DIST ICA EDV: 31.5 CM/SEC
BH CV XLRA MEAS RIGHT DIST ICA PSV: 82.7 CM/SEC
BH CV XLRA MEAS RIGHT ICA/CCA RATIO: 0.87
BH CV XLRA MEAS RIGHT MID CCA EDV: 25.2 CM/SEC
BH CV XLRA MEAS RIGHT MID CCA PSV: 95.3 CM/SEC
BH CV XLRA MEAS RIGHT MID ICA EDV: 24.7 CM/SEC
BH CV XLRA MEAS RIGHT MID ICA PSV: 67 CM/SEC
BH CV XLRA MEAS RIGHT PROX CCA EDV: 15.4 CM/SEC
BH CV XLRA MEAS RIGHT PROX CCA PSV: 93.2 CM/SEC
BH CV XLRA MEAS RIGHT PROX ECA EDV: 15.4 CM/SEC
BH CV XLRA MEAS RIGHT PROX ECA PSV: 113 CM/SEC
BH CV XLRA MEAS RIGHT PROX ICA EDV: 23.6 CM/SEC
BH CV XLRA MEAS RIGHT PROX ICA PSV: 79 CM/SEC
BH CV XLRA MEAS RIGHT PROX SCLA PSV: 104 CM/SEC
BH CV XLRA MEAS RIGHT VERTEBRAL A EDV: 21.4 CM/SEC
BH CV XLRA MEAS RIGHT VERTEBRAL A PSV: 63.1 CM/SEC
LEFT ARM BP: NORMAL MMHG
MAXIMAL PREDICTED HEART RATE: 170 BPM
RIGHT ARM BP: NORMAL MMHG
STRESS TARGET HR: 145 BPM

## 2022-10-02 DIAGNOSIS — Z86.718 HISTORY OF DVT OF LOWER EXTREMITY: ICD-10-CM

## 2022-10-02 DIAGNOSIS — E06.3 HYPOTHYROIDISM DUE TO HASHIMOTO'S THYROIDITIS: ICD-10-CM

## 2022-10-02 DIAGNOSIS — I25.10 CARDIOVASCULAR DISEASE: ICD-10-CM

## 2022-10-02 DIAGNOSIS — E11.9 CONTROLLED TYPE 2 DIABETES MELLITUS WITHOUT COMPLICATION, WITHOUT LONG-TERM CURRENT USE OF INSULIN: ICD-10-CM

## 2022-10-02 DIAGNOSIS — E03.8 HYPOTHYROIDISM DUE TO HASHIMOTO'S THYROIDITIS: ICD-10-CM

## 2022-10-02 DIAGNOSIS — I10 ESSENTIAL HYPERTENSION: ICD-10-CM

## 2022-10-03 RX ORDER — LEVOTHYROXINE SODIUM 0.2 MG/1
200 TABLET ORAL DAILY
Qty: 90 TABLET | Refills: 0 | Status: SHIPPED | OUTPATIENT
Start: 2022-10-03 | End: 2023-01-21 | Stop reason: SDUPTHER

## 2022-10-03 RX ORDER — APIXABAN 5 MG/1
5 TABLET, FILM COATED ORAL EVERY 12 HOURS
Qty: 180 TABLET | Refills: 0 | Status: SHIPPED | OUTPATIENT
Start: 2022-10-03 | End: 2022-11-28 | Stop reason: SDUPTHER

## 2022-10-03 RX ORDER — ATORVASTATIN CALCIUM 40 MG/1
40 TABLET, FILM COATED ORAL NIGHTLY
Qty: 90 TABLET | Refills: 0 | Status: SHIPPED | OUTPATIENT
Start: 2022-10-03

## 2022-10-03 RX ORDER — LEVOTHYROXINE SODIUM 0.03 MG/1
25 TABLET ORAL DAILY
Qty: 90 TABLET | Refills: 0 | Status: SHIPPED | OUTPATIENT
Start: 2022-10-03 | End: 2023-01-21 | Stop reason: SDUPTHER

## 2022-10-03 RX ORDER — NIFEDIPINE 60 MG/1
60 TABLET, FILM COATED, EXTENDED RELEASE ORAL DAILY
Qty: 30 TABLET | Refills: 5 | Status: SHIPPED | OUTPATIENT
Start: 2022-10-03

## 2022-10-03 RX ORDER — VALSARTAN AND HYDROCHLOROTHIAZIDE 80; 12.5 MG/1; MG/1
1 TABLET, FILM COATED ORAL DAILY
Qty: 30 TABLET | Refills: 5 | Status: SHIPPED | OUTPATIENT
Start: 2022-10-03

## 2022-11-28 DIAGNOSIS — Z86.718 HISTORY OF DVT OF LOWER EXTREMITY: ICD-10-CM

## 2022-11-28 RX ORDER — APIXABAN 5 MG/1
5 TABLET, FILM COATED ORAL EVERY 12 HOURS
Qty: 60 TABLET | Refills: 3 | Status: SHIPPED | OUTPATIENT
Start: 2022-11-28

## 2023-01-21 DIAGNOSIS — E03.8 HYPOTHYROIDISM DUE TO HASHIMOTO'S THYROIDITIS: ICD-10-CM

## 2023-01-21 DIAGNOSIS — E06.3 HYPOTHYROIDISM DUE TO HASHIMOTO'S THYROIDITIS: ICD-10-CM

## 2023-01-23 RX ORDER — LEVOTHYROXINE SODIUM 0.2 MG/1
200 TABLET ORAL DAILY
Qty: 90 TABLET | Refills: 0 | Status: SHIPPED | OUTPATIENT
Start: 2023-01-23

## 2023-01-23 RX ORDER — LEVOTHYROXINE SODIUM 0.03 MG/1
25 TABLET ORAL DAILY
Qty: 90 TABLET | Refills: 0 | Status: SHIPPED | OUTPATIENT
Start: 2023-01-23

## 2023-01-23 NOTE — TELEPHONE ENCOUNTER
Rx Refill Note  Requested Prescriptions     Pending Prescriptions Disp Refills   • levothyroxine (SYNTHROID, LEVOTHROID) 25 MCG tablet 90 tablet 0     Sig: Take 1 tablet by mouth Daily.   • levothyroxine (SYNTHROID, LEVOTHROID) 200 MCG tablet 90 tablet 0     Sig: Take 1 tablet by mouth Daily.      Last office visit with prescribing clinician: 7/27/2022   Last telemedicine visit with prescribing clinician: Visit date not found   Next office visit with prescribing clinician: Visit date not found                         Would you like a call back once the refill request has been completed: [] Yes [] No    If the office needs to give you a call back, can they leave a voicemail: [] Yes [] No    Bartolo Gutiérrez MA  01/23/23, 10:37 EST

## 2023-05-16 DIAGNOSIS — Z86.718 HISTORY OF DVT OF LOWER EXTREMITY: ICD-10-CM

## 2023-05-16 RX ORDER — APIXABAN 5 MG/1
5 TABLET, FILM COATED ORAL EVERY 12 HOURS
Qty: 60 TABLET | Refills: 2 | Status: SHIPPED | OUTPATIENT
Start: 2023-05-16

## 2023-05-16 NOTE — TELEPHONE ENCOUNTER
Rx Refill Note  Requested Prescriptions     Pending Prescriptions Disp Refills   • Eliquis 5 MG tablet tablet [Pharmacy Med Name: ELIQUIS 5 MG TABLET] 42 tablet 2     Sig: Take 1 tablet by mouth Every 12 (Twelve) Hours.      Last office visit with prescribing clinician: 7/27/2022   Last telemedicine visit with prescribing clinician: 1/21/2023   Next office visit with prescribing clinician: Visit date not found                         Would you like a call back once the refill request has been completed: [] Yes [] No    If the office needs to give you a call back, can they leave a voicemail: [] Yes [] No    Bartolo Gutiérrez MA  05/16/23, 13:54 EDT

## 2023-09-06 DIAGNOSIS — Z86.718 HISTORY OF DVT OF LOWER EXTREMITY: ICD-10-CM

## 2023-09-07 RX ORDER — APIXABAN 5 MG/1
5 TABLET, FILM COATED ORAL EVERY 12 HOURS
Qty: 60 TABLET | Refills: 0 | Status: SHIPPED | OUTPATIENT
Start: 2023-09-07

## 2023-09-07 NOTE — TELEPHONE ENCOUNTER
"Hub/front can read       \"\"  I will send in one month refill for patient. She needs to schedule for annual physical exam for any further refills.  She has not been seen in over a year.     "

## 2024-03-19 ENCOUNTER — TELEPHONE (OUTPATIENT)
Dept: FAMILY MEDICINE CLINIC | Facility: CLINIC | Age: 54
End: 2024-03-19

## 2024-03-19 NOTE — TELEPHONE ENCOUNTER
Caller: Jazmín Hicks    Relationship: Self    Best call back number: [phone]    What form or medical record are you requesting: REQUEST FOR MEDICAL RECORDS    Who is requesting this form or medical record from you: [facility], DR JOEL GUARDADO    How would you like to receive the form or medical records (pick-up, mail, fax): FAX  If fax, what is the fax number: [phone]    Timeframe paperwork needed: ASAP    Additional notes: IN ORDER FOR HER FORMER PCP TO SEND OVER RECORDS, THEY NEED A REQUEST FROM CARLO [facility]. IT NEEDS TO BE MARKED URGENT REQUEST AND HAVE THE DATE OF THE PATIENT'S UPCOMING APPOINTMENT WHICH IS 04/08/2024

## 2024-04-05 ENCOUNTER — LAB (OUTPATIENT)
Dept: LAB | Facility: HOSPITAL | Age: 54
End: 2024-04-05
Payer: MEDICAID

## 2024-04-05 ENCOUNTER — OFFICE VISIT (OUTPATIENT)
Dept: FAMILY MEDICINE CLINIC | Facility: CLINIC | Age: 54
End: 2024-04-05
Payer: MEDICAID

## 2024-04-05 VITALS
HEIGHT: 65 IN | WEIGHT: 293 LBS | SYSTOLIC BLOOD PRESSURE: 140 MMHG | TEMPERATURE: 98 F | HEART RATE: 94 BPM | OXYGEN SATURATION: 98 % | DIASTOLIC BLOOD PRESSURE: 100 MMHG | BODY MASS INDEX: 48.82 KG/M2

## 2024-04-05 DIAGNOSIS — Z86.718 HISTORY OF DVT OF LOWER EXTREMITY: ICD-10-CM

## 2024-04-05 DIAGNOSIS — F41.9 ANXIETY AND DEPRESSION: ICD-10-CM

## 2024-04-05 DIAGNOSIS — D35.2 PITUITARY MACROADENOMA: ICD-10-CM

## 2024-04-05 DIAGNOSIS — M79.7 FIBROMYALGIA: ICD-10-CM

## 2024-04-05 DIAGNOSIS — M25.50 ARTHRALGIA, UNSPECIFIED JOINT: ICD-10-CM

## 2024-04-05 DIAGNOSIS — Z51.81 THERAPEUTIC DRUG MONITORING: ICD-10-CM

## 2024-04-05 DIAGNOSIS — E06.3 HASHIMOTO'S DISEASE: ICD-10-CM

## 2024-04-05 DIAGNOSIS — E78.1 HYPERTRIGLYCERIDEMIA: ICD-10-CM

## 2024-04-05 DIAGNOSIS — I10 ESSENTIAL HYPERTENSION: Primary | ICD-10-CM

## 2024-04-05 DIAGNOSIS — I50.9 CHRONIC HEART FAILURE, UNSPECIFIED HEART FAILURE TYPE: ICD-10-CM

## 2024-04-05 DIAGNOSIS — E78.5 HYPERLIPIDEMIA, UNSPECIFIED HYPERLIPIDEMIA TYPE: ICD-10-CM

## 2024-04-05 DIAGNOSIS — E03.9 ACQUIRED HYPOTHYROIDISM: ICD-10-CM

## 2024-04-05 DIAGNOSIS — F32.A ANXIETY AND DEPRESSION: ICD-10-CM

## 2024-04-05 DIAGNOSIS — I20.0 UNSTABLE ANGINA: ICD-10-CM

## 2024-04-05 DIAGNOSIS — E75.21 FABRY'S DISEASE: ICD-10-CM

## 2024-04-05 DIAGNOSIS — E11.40 TYPE 2 DIABETES MELLITUS WITH DIABETIC NEUROPATHY, WITHOUT LONG-TERM CURRENT USE OF INSULIN: ICD-10-CM

## 2024-04-05 DIAGNOSIS — G62.9 NEUROPATHY: ICD-10-CM

## 2024-04-05 DIAGNOSIS — I48.91 ATRIAL FIBRILLATION, UNSPECIFIED TYPE: ICD-10-CM

## 2024-04-05 LAB
EXPIRATION DATE: ABNORMAL
EXPIRATION DATE: NORMAL
HBA1C MFR BLD: 5.8 % (ref 4.5–5.7)
Lab: ABNORMAL
Lab: NORMAL
POC CREATININE URINE: 50
POC MICROALBUMIN URINE: 10

## 2024-04-05 PROCEDURE — 84443 ASSAY THYROID STIM HORMONE: CPT | Performed by: FAMILY MEDICINE

## 2024-04-05 PROCEDURE — 84439 ASSAY OF FREE THYROXINE: CPT | Performed by: FAMILY MEDICINE

## 2024-04-05 RX ORDER — LEVOTHYROXINE SODIUM 175 UG/1
175 TABLET ORAL DAILY
Qty: 30 TABLET | Refills: 2 | Status: SHIPPED | OUTPATIENT
Start: 2024-04-05

## 2024-04-05 RX ORDER — FUROSEMIDE 20 MG/1
20 TABLET ORAL 2 TIMES DAILY
Qty: 60 TABLET | Refills: 2 | Status: SHIPPED | OUTPATIENT
Start: 2024-04-05

## 2024-04-05 RX ORDER — SUMATRIPTAN 100 MG/1
100 TABLET, FILM COATED ORAL
COMMUNITY
End: 2024-04-05

## 2024-04-05 RX ORDER — OMEPRAZOLE 20 MG/1
20 CAPSULE, DELAYED RELEASE ORAL DAILY
COMMUNITY

## 2024-04-05 RX ORDER — ROSUVASTATIN CALCIUM 5 MG/1
5 TABLET, COATED ORAL DAILY
Qty: 30 TABLET | Refills: 2 | Status: SHIPPED | OUTPATIENT
Start: 2024-04-05

## 2024-04-05 RX ORDER — GEMFIBROZIL 600 MG/1
600 TABLET, FILM COATED ORAL
COMMUNITY
End: 2024-04-05 | Stop reason: SDUPTHER

## 2024-04-05 RX ORDER — MONTELUKAST SODIUM 4 MG/1
2 TABLET, CHEWABLE ORAL 2 TIMES DAILY
Qty: 120 TABLET | Refills: 5
Start: 2024-04-05

## 2024-04-05 RX ORDER — CETIRIZINE HYDROCHLORIDE 10 MG/1
10 TABLET ORAL DAILY
COMMUNITY

## 2024-04-05 RX ORDER — PREGABALIN 100 MG/1
100 CAPSULE ORAL 2 TIMES DAILY
COMMUNITY
End: 2024-04-05

## 2024-04-05 RX ORDER — SPIRONOLACTONE 25 MG/1
25 TABLET ORAL DAILY
Qty: 30 TABLET | Refills: 2 | Status: SHIPPED | OUTPATIENT
Start: 2024-04-05

## 2024-04-05 RX ORDER — MELOXICAM 7.5 MG/1
7.5 TABLET ORAL DAILY
COMMUNITY
End: 2024-04-05 | Stop reason: SDUPTHER

## 2024-04-05 RX ORDER — GEMFIBROZIL 600 MG/1
600 TABLET, FILM COATED ORAL DAILY
Qty: 30 TABLET | Refills: 2 | Status: SHIPPED | OUTPATIENT
Start: 2024-04-05

## 2024-04-05 RX ORDER — SPIRONOLACTONE 25 MG/1
25 TABLET ORAL DAILY
COMMUNITY
End: 2024-04-05 | Stop reason: SDUPTHER

## 2024-04-05 RX ORDER — PREGABALIN 50 MG/1
50 CAPSULE ORAL 3 TIMES DAILY
COMMUNITY
End: 2024-04-05

## 2024-04-05 RX ORDER — MELOXICAM 7.5 MG/1
7.5 TABLET ORAL DAILY
Qty: 30 TABLET | Refills: 2 | Status: SHIPPED | OUTPATIENT
Start: 2024-04-05

## 2024-04-05 RX ORDER — FUROSEMIDE 20 MG/1
20 TABLET ORAL 2 TIMES DAILY
COMMUNITY
End: 2024-04-05 | Stop reason: SDUPTHER

## 2024-04-05 RX ORDER — LOSARTAN POTASSIUM 100 MG/1
100 TABLET ORAL DAILY
Qty: 30 TABLET | Refills: 2 | Status: SHIPPED | OUTPATIENT
Start: 2024-04-05

## 2024-04-05 RX ORDER — APIXABAN 5 MG/1
5 TABLET, FILM COATED ORAL EVERY 12 HOURS
Qty: 60 TABLET | Refills: 5 | Status: SHIPPED | OUTPATIENT
Start: 2024-04-05

## 2024-04-05 RX ORDER — LEVOTHYROXINE SODIUM 175 UG/1
175 TABLET ORAL DAILY
COMMUNITY
End: 2024-04-05 | Stop reason: SDUPTHER

## 2024-04-05 RX ORDER — PREGABALIN 150 MG/1
CAPSULE ORAL
Qty: 90 CAPSULE | Refills: 2 | Status: SHIPPED | OUTPATIENT
Start: 2024-04-05

## 2024-04-05 RX ORDER — PREGABALIN 150 MG/1
150 CAPSULE ORAL 2 TIMES DAILY
COMMUNITY
End: 2024-04-05

## 2024-04-05 RX ORDER — ASPIRIN 325 MG
325 TABLET, DELAYED RELEASE (ENTERIC COATED) ORAL EVERY 6 HOURS PRN
COMMUNITY

## 2024-04-05 RX ORDER — NITROGLYCERIN 400 UG/1
1 SPRAY ORAL
Qty: 12 G | Refills: 1 | Status: SHIPPED | OUTPATIENT
Start: 2024-04-05

## 2024-04-05 RX ORDER — ROSUVASTATIN CALCIUM 5 MG/1
5 TABLET, COATED ORAL DAILY
COMMUNITY
End: 2024-04-05 | Stop reason: SDUPTHER

## 2024-04-05 RX ORDER — HYDROCHLOROTHIAZIDE 25 MG/1
25 TABLET ORAL DAILY
COMMUNITY
End: 2024-04-05 | Stop reason: SDUPTHER

## 2024-04-05 RX ORDER — LOSARTAN POTASSIUM 100 MG/1
100 TABLET ORAL DAILY
COMMUNITY
End: 2024-04-05 | Stop reason: SDUPTHER

## 2024-04-05 RX ORDER — HYDROCHLOROTHIAZIDE 25 MG/1
25 TABLET ORAL DAILY
Qty: 30 TABLET | Refills: 5 | Status: SHIPPED | OUTPATIENT
Start: 2024-04-05

## 2024-04-05 NOTE — PATIENT INSTRUCTIONS
Take medications as ordered.  Low fat, low salt diet.  Exercise as tolerated.  Portion control make good food choice.  Exercise as tolerated.  Labs today.  Keep appt with specialists.

## 2024-04-05 NOTE — PROGRESS NOTES
Follow Up Office Visit      Date: 2024   Patient Name: Jazmín Hicks  : 1970   MRN: [id number]     Chief Complaint:    Chief Complaint   Patient presents with    Coronary Artery Disease    Brain Tumor     Pituitary Macroadenoma         History of Present Illness: Jazmín Hicks is a 53 y.o. female who presents today for follow-up for cad and history of Macroadenoma.    With Adonay in office today.    Sees BONITA Corona for PCP.    Last visit that I can see in the chart with the note from Isabel is from 2022.  Appears patient has been noncompliant with appointments, and medications.    Still has Nitroglycerin.    Patient reports she  had a MI and went to [place] for a year.  Reports had pacemaker placed while there.  Reports she went to stay with one of her sisters.     Reports has been out of all her medications for about a week now.  Reports now on medicaid.  Reports they would not honor the prescriptions from [place].  Reports has been out for 7 days.    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  Reports this was discovered in [place].    Has a pituitary microadenoma.  Reports had Afib and heart was sopping for several seconds. Reports the now has a pacemaker now.    History includes DVT, hyperlipidemia, Hashimoto's, CAD, diabetes    Former smoker quit 2017    Was seeing Cardioloogy   Dr. Garcia in [place].  Dr. Cortes did heart cath.  No stents.   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  Was seen by Dr Fong in [place], [place].       Reports she does have terible triglycerides      Repoorts started her on Lyrica due to neuropathy and started since Fabry's was discovered.    Prev dx with FM in s and .  Takes one Lyrica in the morning and two at night.  Taking 150 in am and 300 mg at nioght.    Just monitoring the pituitary tumor for now.  Will need NS locally.  Reports the tumor is growning.    Also will need Cardiology locally.      Patient reports she is starting  back to work on Monday.  Has been depressed.  No plan to hurt herself.  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  Previously took Zoloft.  Agreeable to referral to Psych.          Subjective      Review of Systems:   Review of Systems   Constitutional:  Negative for chills and fever.   Gastrointestinal:  Positive for nausea (occ). Negative for vomiting.   Neurological:  Negative for seizures and syncope.   Psychiatric/Behavioral:  Positive for suicidal ideas.        I have reviewed the patients family history, social history, past medical history, past surgical history and have updated it as appropriate.     Medications:     Current Outpatient Medications:     albuterol sulfate  (90 Base) MCG/ACT inhaler, Inhale 2 puffs Every 4 (Four) Hours As Needed for Shortness of Air., Disp: 18 g, Rfl: 0    aspirin 325 MG EC tablet, Take 1 tablet by mouth Every 6 (Six) Hours As Needed for Mild Pain., Disp: , Rfl:     cetirizine (zyrTEC) 10 MG tablet, Take 1 tablet by mouth Daily., Disp: , Rfl:     colestipol (COLESTID) 1 g tablet, Take 2 tablets by mouth 2 (Two) Times a Day. Titrate to effectiveness. Do not take within 2 hours of other medications, Disp: 120 tablet, Rfl: 5    Eliquis 5 MG tablet tablet, Take 1 tablet by mouth Every 12 (Twelve) Hours., Disp: 60 tablet, Rfl: 5    furosemide (LASIX) 20 MG tablet, Take 1 tablet by mouth 2 (Two) Times a Day., Disp: 60 tablet, Rfl: 2    gemfibrozil (LOPID) 600 MG tablet, Take 1 tablet by mouth Daily., Disp: 30 tablet, Rfl: 2    hydroCHLOROthiazide 25 MG tablet, Take 1 tablet by mouth Daily., Disp: 30 tablet, Rfl: 5    levothyroxine (SYNTHROID, LEVOTHROID) 175 MCG tablet, Take 1 tablet by mouth Daily., Disp: 30 tablet, Rfl: 2    losartan (COZAAR) 100 MG tablet, Take 1 tablet by mouth Daily., Disp: 30 tablet, Rfl: 2    meloxicam (MOBIC) 7.5 MG tablet, Take 1 tablet by mouth Daily., Disp: 30 tablet, Rfl: 2    metFORMIN (GLUCOPHAGE) 500 MG tablet, Take 1 tablet by mouth 2 (Two) Times a Day With  "Meals., Disp: 60 tablet, Rfl: 3    metoprolol tartrate (LOPRESSOR) 25 MG tablet, Take 1 tablet by mouth 2 (Two) Times a Day., Disp: 60 tablet, Rfl: 2    multivitamin with minerals (MULTIVITAMIN ADULT PO), Take 1 tablet by mouth Daily. Vitafusion, Disp: , Rfl:     NIFEdipine CC (ADALAT CC) 60 MG 24 hr tablet, Take 1 tablet by mouth Daily., Disp: 30 tablet, Rfl: 2    nitroglycerin (NITROLINGUAL) 0.4 MG/SPRAY spray, Place 1 spray under the tongue Every 5 (Five) Minutes As Needed for Chest Pain. Repeat up to three times, then go to ER., Disp: 12 g, Rfl: 1    omeprazole (priLOSEC) 20 MG capsule, Take 1 capsule by mouth Daily., Disp: , Rfl:     pregabalin (LYRICA) 150 MG capsule, 150 mg in am and 300 mg in evening., Disp: 90 capsule, Rfl: 2    rosuvastatin (CRESTOR) 5 MG tablet, Take 1 tablet by mouth Daily., Disp: 30 tablet, Rfl: 2    spironolactone (ALDACTONE) 25 MG tablet, Take 1 tablet by mouth Daily., Disp: 30 tablet, Rfl: 2    sertraline (ZOLOFT) 50 MG tablet, Take 1 tablet by mouth Daily., Disp: 90 tablet, Rfl: 1    Allergies:   Allergies   Allergen Reactions    Capsaicin Shortness Of Breath    Adhesive Tape Other (See Comments)     blisters    Darvon [Propoxyphene] GI Intolerance    Lisinopril Nausea Only       Objective     Physical Exam: Please see above  Vital Signs:   Vitals:    04/05/24 1457   BP: 140/100   Pulse: 94   Temp: 98 °F (36.7 °C)   TempSrc: Temporal   SpO2: 98%   Weight: (!) 162 kg (357 lb 6.4 oz)   Height: 165.1 cm (65\")   PainSc:   6   PainLoc: Back     Body mass index is 59.47 kg/m².  Class 3 Severe Obesity (BMI >=40). Obesity-related health conditions include the following: hypertension, diabetes mellitus, and dyslipidemias. Obesity is unchanged. BMI is is above average; BMI management plan is completed. We discussed portion control and increasing exercise.       Physical Exam  Vitals and nursing note reviewed.   Constitutional:       Appearance: Normal appearance.   HENT:      Head: " Normocephalic and atraumatic.   Neck:      Vascular: No carotid bruit.   Cardiovascular:      Rate and Rhythm: Normal rate and regular rhythm.      Heart sounds: Normal heart sounds. No murmur heard.  Pulmonary:      Effort: Pulmonary effort is normal.      Breath sounds: Normal breath sounds.   Abdominal:      General: Bowel sounds are normal.      Palpations: Abdomen is soft. There is no mass.      Tenderness: There is no abdominal tenderness.   Musculoskeletal:      Right lower leg: No edema.      Left lower leg: No edema.   Skin:     Coloration: Skin is not jaundiced or pale.      Findings: No erythema.   Neurological:      Mental Status: She is alert. Mental status is at baseline.   Psychiatric:         Mood and Affect: Mood normal.         Behavior: Behavior normal.         Procedures    Results:   Labs:   Hemoglobin A1C   Date Value Ref Range Status   04/05/2024 5.8 (A) 4.5 - 5.7 % Final   07/23/2022 6.40 (H) 4.80 - 5.60 % Final     TSH   Date Value Ref Range Status   04/05/2024 2.370 0.270 - 4.200 uIU/mL Final        POCT Results (if applicable):   Results for orders placed or performed in visit on 04/05/24   TSH    Specimen: Blood   Result Value Ref Range    TSH 2.370 0.270 - 4.200 uIU/mL   T4, Free    Specimen: Blood   Result Value Ref Range    Free T4 1.37 0.93 - 1.70 ng/dL   POC Glycosylated Hemoglobin (Hb A1C)    Specimen: Blood   Result Value Ref Range    Hemoglobin A1C 5.8 (A) 4.5 - 5.7 %    Lot Number [id number]     Expiration Date 12/17/2025    POC Microalbumin    Specimen: Urine   Result Value Ref Range    Microalbumin, Urine 10     Creatinine, Urine 50     Lot Number [id number]     Expiration Date 05/09/2025        Imaging:   No valid procedures specified.     Measures:   Advanced Care Planning:   Patient does not have an advance directive, declines further assistance.    Smoking Cessation:   Does not smoke      Assessment / Plan      Assessment/Plan:       Otilio patel.  Contract  today.  Compliance drug screen today.          1. Essential hypertension    Discussed importance of following low-salt diet for blood pressure control.  Continue Metoprolol, Nifedipine, Losartan.  Referral to Cardiology.    - metoprolol tartrate (LOPRESSOR) 25 MG tablet; Take 1 tablet by mouth 2 (Two) Times a Day.  Dispense: 60 tablet; Refill: 2  - NIFEdipine CC (ADALAT CC) 60 MG 24 hr tablet; Take 1 tablet by mouth Daily.  Dispense: 30 tablet; Refill: 2  - losartan (COZAAR) 100 MG tablet; Take 1 tablet by mouth Daily.  Dispense: 30 tablet; Refill: 2  - Ambulatory Referral to Cardiology    2. Atrial fibrillation, unspecified type    - Ambulatory Referral to Cardiology    3. Unstable angina  Nitroglycerine to have on hand.  Referral to Cardiology.    - nitroglycerin (NITROLINGUAL) 0.4 MG/SPRAY spray; Place 1 spray under the tongue Every 5 (Five) Minutes As Needed for Chest Pain. Repeat up to three times, then go to ER.  Dispense: 12 g; Refill: 1    4. Chronic heart failure, unspecified heart failure type  Continue hctz, Lasix.  Referral to Cardiology.    - hydroCHLOROthiazide 25 MG tablet; Take 1 tablet by mouth Daily.  Dispense: 30 tablet; Refill: 5  - spironolactone (ALDACTONE) 25 MG tablet; Take 1 tablet by mouth Daily.  Dispense: 30 tablet; Refill: 2  - furosemide (LASIX) 20 MG tablet; Take 1 tablet by mouth 2 (Two) Times a Day.  Dispense: 60 tablet; Refill: 2  - Ambulatory Referral to Cardiology    5. History of DVT of lower extremity  Continue Eliquis.  Referral to Cardiology.    - Eliquis 5 MG tablet tablet; Take 1 tablet by mouth Every 12 (Twelve) Hours.  Dispense: 60 tablet; Refill: 5  - Ambulatory Referral to Cardiology    6. Hyperlipidemia, unspecified hyperlipidemia type  Continue Crestor.    - rosuvastatin (CRESTOR) 5 MG tablet; Take 1 tablet by mouth Daily.  Dispense: 30 tablet; Refill: 2    7. Hypertriglyceridemia  Continue Colestid, Lopid.    - colestipol (COLESTID) 1 g tablet; Take 2 tablets by mouth  2 (Two) Times a Day. Titrate to effectiveness. Do not take within 2 hours of other medications  Dispense: 120 tablet; Refill: 5  - gemfibrozil (LOPID) 600 MG tablet; Take 1 tablet by mouth Daily.  Dispense: 30 tablet; Refill: 2    8. Type 2 diabetes mellitus with diabetic neuropathy, without long-term current use of insulin  Discussed importance of portion control and making good food choices for diabetes control.  Check A1C to evaluate treatment/control.  Check Microalbumin.  Continue Metformin.    - POC Glycosylated Hemoglobin (Hb A1C)  - POC Microalbumin  - Comprehensive Metabolic Panel; Future  - metFORMIN (GLUCOPHAGE) 500 MG tablet; Take 1 tablet by mouth 2 (Two) Times a Day With Meals.  Dispense: 60 tablet; Refill: 3    9. Anxiety and depression  Start Zoloft daily.  Referral to Psych.    - sertraline (ZOLOFT) 50 MG tablet; Take 1 tablet by mouth Daily.  Dispense: 90 tablet; Refill: 1  - Ambulatory Referral to Psychiatry    10. Neuropathy  10/11 Continue Lyrica at current dosing.  This was started in [place] per patient report.    - pregabalin (LYRICA) 150 MG capsule; 150 mg in am and 300 mg in evening.  Dispense: 90 capsule; Refill: 2    11. Fibromyalgia  Encouraged regular exercise and 7 hours of sleep at night.    - pregabalin (LYRICA) 150 MG capsule; 150 mg in am and 300 mg in evening.  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  I am unable to see notes in chart.  Plan to discuss further at follow-up with patient.    13. Acquired hypothyroidism  Check TFTs to evaluate current treatment.  Continue Synthroid.  Consisder Endocrine referral in the future.    - TSH; Future  - T4, Free; Future  - levothyroxine (SYNTHROID, LEVOTHROID) 175 MCG tablet; Take 1 tablet by mouth Daily.  Dispense: 30 tablet; Refill: 2  - TSH  - T4, Free    14. Hashimoto's disease  As above.    - TSH; Future  - T4, Free; Future  - levothyroxine (SYNTHROID,  LEVOTHROID) 175 MCG tablet; Take 1 tablet by mouth Daily.  Dispense: 30 tablet; Refill: 2  - TSH  - T4, Free    15. Arthralgia, unspecified joint  Mobic as ordered.    - meloxicam (MOBIC) 7.5 MG tablet; Take 1 tablet by mouth Daily.  Dispense: 30 tablet; Refill: 2    16. Pituitary macroadenoma  Referral to Neurosurgery.    - Ambulatory Referral to Neurosurgery    17. Therapeutic drug monitoring  Compliance drug screen today.  Otilio reviewed today.    - Compliance Drug Analysis, Ur - Urine, Clean Catch; Future  - Compliance Drug Analysis, Ur - Urine, Clean Catch      Part of this note may be an electronic transcription/translation of spoken language to printed text using the Dragon Dictation System.      63 minutes were spent on this patient encounter which included history taking, physical exam, answering patient questions, counseling, discussing treatment plan, placing orders, and documentation.        Vaccine Counseling:      Follow Up:   Return in about 4 weeks (around 5/3/2024).      DO TANA Herr

## 2024-04-06 LAB
T4 FREE SERPL-MCNC: 1.37 NG/DL (ref 0.93–1.7)
TSH SERPL DL<=0.05 MIU/L-ACNC: 2.37 UIU/ML (ref 0.27–4.2)

## 2024-04-12 LAB — DRUGS UR: NORMAL

## 2024-04-29 ENCOUNTER — APPOINTMENT (OUTPATIENT)
Dept: GENERAL RADIOLOGY | Facility: HOSPITAL | Age: 54
End: 2024-04-29
Payer: MEDICAID

## 2024-04-29 ENCOUNTER — HOSPITAL ENCOUNTER (EMERGENCY)
Facility: HOSPITAL | Age: 54
Discharge: HOME | End: 2024-04-29
Attending: EMERGENCY MEDICINE | Admitting: EMERGENCY MEDICINE
Payer: MEDICAID

## 2024-04-29 VITALS
SYSTOLIC BLOOD PRESSURE: 116 MMHG | HEART RATE: 81 BPM | BODY MASS INDEX: 47.09 KG/M2 | HEIGHT: 66 IN | DIASTOLIC BLOOD PRESSURE: 54 MMHG | OXYGEN SATURATION: 94 % | RESPIRATION RATE: 18 BRPM | TEMPERATURE: 98.2 F | WEIGHT: 293 LBS

## 2024-04-29 DIAGNOSIS — E75.21 FABRY DISEASE: ICD-10-CM

## 2024-04-29 DIAGNOSIS — R07.9 CHEST PAIN, UNSPECIFIED TYPE: Primary | ICD-10-CM

## 2024-04-29 LAB
ALBUMIN SERPL-MCNC: 4.1 G/DL (ref 3.5–5.2)
ALBUMIN/GLOB SERPL: 1.2 G/DL
ALP SERPL-CCNC: 105 U/L (ref 39–117)
ALT SERPL W P-5'-P-CCNC: 24 U/L (ref 1–33)
ANION GAP SERPL CALCULATED.3IONS-SCNC: 11 MMOL/L (ref 5–15)
AST SERPL-CCNC: 23 U/L (ref 1–32)
BASOPHILS # BLD AUTO: 0.05 10*3/MM3 (ref 0–0.2)
BASOPHILS NFR BLD AUTO: 0.7 % (ref 0–1.5)
BILIRUB SERPL-MCNC: 0.3 MG/DL (ref 0–1.2)
BUN SERPL-MCNC: 12 MG/DL (ref 6–20)
BUN/CREAT SERPL: 14.3 (ref 7–25)
CALCIUM SPEC-SCNC: 9.4 MG/DL (ref 8.6–10.5)
CHLORIDE SERPL-SCNC: 105 MMOL/L (ref 98–107)
CO2 SERPL-SCNC: 26 MMOL/L (ref 22–29)
CREAT SERPL-MCNC: 0.84 MG/DL (ref 0.57–1)
DEPRECATED RDW RBC AUTO: 43.1 FL (ref 37–54)
EGFRCR SERPLBLD CKD-EPI 2021: 83.7 ML/MIN/1.73
EOSINOPHIL # BLD AUTO: 0.11 10*3/MM3 (ref 0–0.4)
EOSINOPHIL NFR BLD AUTO: 1.4 % (ref 0.3–6.2)
ERYTHROCYTE [DISTWIDTH] IN BLOOD BY AUTOMATED COUNT: 14.1 % (ref 12.3–15.4)
GEN 5 2HR TROPONIN T REFLEX: <6 NG/L
GLOBULIN UR ELPH-MCNC: 3.5 GM/DL
GLUCOSE SERPL-MCNC: 97 MG/DL (ref 65–99)
HCT VFR BLD AUTO: 39.6 % (ref 34–46.6)
HGB BLD-MCNC: 12.6 G/DL (ref 12–15.9)
HOLD SPECIMEN: NORMAL
IMM GRANULOCYTES # BLD AUTO: 0.04 10*3/MM3 (ref 0–0.05)
IMM GRANULOCYTES NFR BLD AUTO: 0.5 % (ref 0–0.5)
LIPASE SERPL-CCNC: 24 U/L (ref 13–60)
LYMPHOCYTES # BLD AUTO: 2.44 10*3/MM3 (ref 0.7–3.1)
LYMPHOCYTES NFR BLD AUTO: 32.1 % (ref 19.6–45.3)
MCH RBC QN AUTO: 26.6 PG (ref 26.6–33)
MCHC RBC AUTO-ENTMCNC: 31.8 G/DL (ref 31.5–35.7)
MCV RBC AUTO: 83.7 FL (ref 79–97)
MONOCYTES # BLD AUTO: 0.37 10*3/MM3 (ref 0.1–0.9)
MONOCYTES NFR BLD AUTO: 4.9 % (ref 5–12)
NEUTROPHILS NFR BLD AUTO: 4.6 10*3/MM3 (ref 1.7–7)
NEUTROPHILS NFR BLD AUTO: 60.4 % (ref 42.7–76)
NRBC BLD AUTO-RTO: 0 /100 WBC (ref 0–0.2)
NT-PROBNP SERPL-MCNC: 358.4 PG/ML (ref 0–900)
PLATELET # BLD AUTO: 309 10*3/MM3 (ref 140–450)
PMV BLD AUTO: 10.4 FL (ref 6–12)
POTASSIUM SERPL-SCNC: 3.5 MMOL/L (ref 3.5–5.2)
PROT SERPL-MCNC: 7.6 G/DL (ref 6–8.5)
RBC # BLD AUTO: 4.73 10*6/MM3 (ref 3.77–5.28)
SODIUM SERPL-SCNC: 142 MMOL/L (ref 136–145)
TROPONIN T DELTA: NORMAL
TROPONIN T SERPL HS-MCNC: 8 NG/L
WBC NRBC COR # BLD AUTO: 7.61 10*3/MM3 (ref 3.4–10.8)
WHOLE BLOOD HOLD COAG: NORMAL
WHOLE BLOOD HOLD SPECIMEN: NORMAL

## 2024-04-29 PROCEDURE — 93005 ELECTROCARDIOGRAM TRACING: CPT | Performed by: EMERGENCY MEDICINE

## 2024-04-29 PROCEDURE — 83690 ASSAY OF LIPASE: CPT | Performed by: EMERGENCY MEDICINE

## 2024-04-29 PROCEDURE — 84484 ASSAY OF TROPONIN QUANT: CPT | Performed by: EMERGENCY MEDICINE

## 2024-04-29 PROCEDURE — 93005 ELECTROCARDIOGRAM TRACING: CPT

## 2024-04-29 PROCEDURE — 36415 COLL VENOUS BLD VENIPUNCTURE: CPT

## 2024-04-29 PROCEDURE — 99284 EMERGENCY DEPT VISIT MOD MDM: CPT

## 2024-04-29 PROCEDURE — 85025 COMPLETE CBC W/AUTO DIFF WBC: CPT | Performed by: EMERGENCY MEDICINE

## 2024-04-29 PROCEDURE — 83880 ASSAY OF NATRIURETIC PEPTIDE: CPT | Performed by: EMERGENCY MEDICINE

## 2024-04-29 PROCEDURE — 71045 X-RAY EXAM CHEST 1 VIEW: CPT

## 2024-04-29 PROCEDURE — 80053 COMPREHEN METABOLIC PANEL: CPT | Performed by: EMERGENCY MEDICINE

## 2024-04-29 RX ORDER — SODIUM CHLORIDE 0.9 % (FLUSH) 0.9 %
10 SYRINGE (ML) INJECTION AS NEEDED
Status: DISCONTINUED | OUTPATIENT
Start: 2024-04-29 | End: 2024-04-29 | Stop reason: HOSPADM

## 2024-04-29 RX ORDER — ASPIRIN 81 MG/1
324 TABLET, CHEWABLE ORAL ONCE
Status: DISCONTINUED | OUTPATIENT
Start: 2024-04-29 | End: 2024-04-29 | Stop reason: HOSPADM

## 2024-04-29 RX ADMIN — NITROGLYCERIN 1 INCH: 20 OINTMENT TOPICAL at 14:17

## 2024-04-29 NOTE — Clinical Note
[facility] EMERGENCY DEPARTMENT  [address]  [facility] [address]  Phone: [phone]    Jazmín Hicks was seen and treated in our emergency department on 4/29/2024.  She may return to work on 05/02/2024.         Thank you for choosing [facility].    Josh Yen PA

## 2024-04-29 NOTE — ED PROVIDER NOTES
Subjective   History of Present Illness  54-year-old female presents emergency department today with chest pain and shortness of breath.  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  She did not become diaphoretic she had some nausea but no vomiting.  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  She reports that she took nitroglycerin really did not help the discomfort no discomfort to go through to her between her shoulder blades.  But she says also did not give her a headache and she is concerned maybe the nitroglycerin was old she had no recent cough or cold or congestion.  She has had no marked increased pedal edema.  She has no other complaints.  She is just recently moved to [place] she does not have an established cardiologist.  She does have a history of hypertension hyperlipidemia.  She does not smoke she does not drink.    History provided by:  Patient   used: No    Chest Pain  Pain location:  Substernal area  Pain quality: pressure and tightness    Pain radiates to:  Upper back  Pain severity:  Moderate  Onset quality:  Sudden  Duration:  20 minutes  Timing:  Intermittent  Progression:  Resolved  Chronicity:  Recurrent  Context: not breathing, not drug use, not lifting, not movement, not at rest and not trauma    Relieved by:  Nothing  Worsened by:  Nothing  Ineffective treatments:  Antacids  Associated symptoms: nausea and shortness of breath    Associated symptoms: no abdominal pain, no altered mental status, no anorexia, no anxiety, no claudication, no diaphoresis, no dysphagia, no fever, no headache, no syncope, no vomiting and no weakness    Risk factors: high cholesterol, hypertension, obesity and prior  DVT/PE    Risk factors: no aortic disease, no birth control, not male and no smoking        Review of Systems   Constitutional:  Negative for diaphoresis and fever.   HENT:  Negative for trouble swallowing.    Respiratory:  Positive for shortness of breath.    Cardiovascular:  Positive for chest pain. Negative for claudication and syncope.   Gastrointestinal:  Positive for nausea. Negative for abdominal pain, anorexia and vomiting.   Neurological:  Negative for weakness and headaches.       Past Medical History:   Diagnosis Date    Abdominal pain     Anemia     Anxiety     Arthritis     Back pain     Bronchitis     Cardiovascular disease     Cataract     Coronary artery disease     Depression     Diarrhea     chronic- since kid    Diverticulitis     DVT (deep venous thrombosis)     left lower calf    Eczema     stress induced    Gall stones     Hashimoto's disease     Heart attack     x5    History of transfusion     no reaction to blood; age 16    Hyperlipidemia     Hypertension     Hypothyroidism     Kidney stone     Lung nodules     Due to COVID    Lyme disease     Migraine headache     OCD (obsessive compulsive disorder)     Panic attack     PCOS (polycystic ovarian syndrome)     takes metformin    Pituitary macroadenoma 07/23/2022    PNA (pneumonia)     Seasonal allergies     Sleep apnea     cpap compliant    Wears glasses        Allergies   Allergen Reactions    Capsaicin Shortness Of Breath    Adhesive Tape Other (See Comments)     blisters    Darvon [Propoxyphene] GI Intolerance    Lisinopril Nausea Only       Past Surgical History:   Procedure Laterality Date    BILATERAL BREAST REDUCTION      CARDIAC CATHETERIZATION  10/2019    CHOLECYSTECTOMY      COLONOSCOPY N/A 8/25/2021    Procedure: Colonoscopy with polypectomy;  Surgeon: Lamont Rm MD;  Location: [facility] ENDOSCOPY;  Service: Gastroenterology;  Laterality: N/A;    KNEE SURGERY Right     scopy    TONSILLECTOMY         Family History   Problem  Relation Age of Onset    Cancer Mother         multiple myoloma     Hypertension Mother     Thyroid disease Mother     Hypothyroidism Mother     Colon polyps Mother     Heart disease Father     Hypertension Father     Deep vein thrombosis Father     Hypothyroidism Father     Heart attack Father     Hypothyroidism Sister     Hypertension Sister     Heart disease Sister     Cancer Sister         colorectal    Colon polyps Sister     Heart attack Sister     Hypothyroidism Brother     Hypertension Brother     Heart disease Brother     Colon polyps Brother     Heart attack Brother     Hypothyroidism Daughter     Scoliosis Daughter     Depression Daughter     Hypertension Maternal Grandmother     Cancer Maternal Grandmother         2 rounds of breast    No Known Problems Maternal Grandfather     Diabetes Paternal Grandmother     Stroke Paternal Grandmother     Hypertension Paternal Grandmother     Heart disease Paternal Grandfather     Heart attack Paternal Grandfather     Hypothyroidism Sister     Hypertension Sister     Colon polyps Sister     Hypothyroidism Brother     Hypertension Brother     Heart disease Brother     Colon polyps Brother     Heart attack Brother     Hypothyroidism Daughter     Anxiety disorder Daughter     Depression Daughter        Social History     Socioeconomic History    Marital status:    Tobacco Use    Smoking status: Former     Current packs/day: 0.00     Average packs/day: 0.8 packs/day for 20.0 years (15.0 ttl pk-yrs)     Types: Cigarettes     Start date:      Quit date: 2017     Years since quittin.3     Passive exposure: Past    Smokeless tobacco: Never   Vaping Use    Vaping status: Never Used    Passive vaping exposure: Yes   Substance and Sexual Activity    Alcohol use: Yes     Comment: wine 3x month    Drug use: Never    Sexual activity: Yes     Birth control/protection: I.U.D.     Comment: Mirena           Objective   Physical Exam  Vitals and nursing note reviewed.    Constitutional:       General: She is not in acute distress.     Appearance: She is well-developed. She is not diaphoretic.      Comments: Warm pink dry afebrile nontoxic   HENT:      Head: Normocephalic and atraumatic.      Nose: Nose normal.   Eyes:      General: No scleral icterus.     Conjunctiva/sclera: Conjunctivae normal.   Cardiovascular:      Rate and Rhythm: Normal rate and regular rhythm.      Heart sounds: Normal heart sounds. No murmur heard.  Pulmonary:      Effort: Pulmonary effort is normal. No respiratory distress.      Breath sounds: Normal breath sounds. No decreased breath sounds, wheezing or rales.   Abdominal:      General: Bowel sounds are normal.      Palpations: Abdomen is soft.      Tenderness: There is no abdominal tenderness.   Musculoskeletal:         General: Normal range of motion.      Cervical back: Normal range of motion and neck supple.   Skin:     General: Skin is warm and dry.   Neurological:      Mental Status: She is alert and oriented to person, place, and time.   Psychiatric:         Behavior: Behavior normal.         Procedures           ED Course  ED Course as of 05/01/24 0811   Mon Apr 29, 2024   1502 Reevaluation this Kurt resting comfortably no pain at the time.  Discussed her laboratory data.  She will have a second set of enzymes within normal anticipate discharge home. [ADITYA]   1502 Laboratory data white count of 7.61 with an H&H of 13 and 40.  Platelet count of 309.  CMP had glucose of 97 BUN 12 creatinine of 0.84.  Sodium 142 and a potassium 3.5.  Liver functions and ALT 24 AST 23 alk phos 105 and total bilirubin of 0.3.  First troponin was 8 lipase of 24.  BNP was 358.  Chest x-ray revealed no acute findings.  Chest ray reviewed by myself.  Final read will be done by the radiologist. [ADITYA]   1627 Second set of enzymes troponin was undetectable.  She has a new prescription for spray nitroglycerin which she will fill.  She referred to the chest pain clinic that she  get established with cardiology.  She was to return here for problems. [ADITYA]      ED Course User Index  [ADITYA] Josh Yen PA                                     No results found for this or any previous visit (from the past 24 hour(s)).    Note: In addition to lab results from this visit, the labs listed above may include labs taken at another facility or during a different encounter within the last 24 hours. Please correlate lab times with ED admission and discharge times for further clarification of the services performed during this visit.    XR Chest 1 View   Final Result   Impression:   Slight haziness of the left lung apex which may be incidental as noted above. No other evidence of active chest pathology.         Electronically Signed: Genaro Sheffield MD     4/29/2024 2:52 PM EDT     Workstation ID: [id number]        Vitals:    04/29/24 1430 04/29/24 1431 04/29/24 1600 04/29/24 1601   BP: 119/71  116/54    BP Location:       Patient Position:       Pulse:  81 81    Resp:       Temp:       TempSrc:       SpO2:  96%  94%   Weight:       Height:         Medications - No data to display    ECG/EMG Results (last 24 hours)       Procedure Component Value Units Date/Time    ECG 12 Lead ED Triage Standing Order; Chest Pain [[id number]] Collected: 04/29/24 1327     Updated: 04/29/24 1328     QT Interval 378 ms      QTC Interval 457 ms     Narrative:      Test Reason : ED Triage Standing Order~  Blood Pressure :   */*   mmHG  Vent. Rate :  88 BPM     Atrial Rate :  88 BPM     P-R Int : 172 ms          QRS Dur :  78 ms      QT Int : 378 ms       P-R-T Axes :  45 -17  22 degrees     QTc Int : 457 ms    Normal sinus rhythm  Possible Left atrial enlargement  Left ventricular hypertrophy  Nonspecific ST abnormality  Abnormal ECG  When compared with ECG of 23-JUL-2022 13:21,  No significant change was found    Referred By: EDMD           Confirmed By:           ECG 12 Lead ED Triage Standing Order; Chest Pain   Preliminary  Result   Test Reason : ED Triage Standing Order~   Blood Pressure :   */*   mmHG   Vent. Rate :  81 BPM     Atrial Rate :  81 BPM      P-R Int : 164 ms          QRS Dur :  80 ms       QT Int : 406 ms       P-R-T Axes :  44 -18  20 degrees      QTc Int : 471 ms      Normal sinus rhythm   Minimal voltage criteria for LVH, may be normal variant   Nonspecific ST abnormality   Abnormal ECG   When compared with ECG of 29-APR-2024 13:27, (Unconfirmed)   No significant change was found      Referred By:            Confirmed By:       ECG 12 Lead ED Triage Standing Order; Chest Pain   Preliminary Result   Test Reason : ED Triage Standing Order~   Blood Pressure :   */*   mmHG   Vent. Rate :  88 BPM     Atrial Rate :  88 BPM      P-R Int : 172 ms          QRS Dur :  78 ms       QT Int : 378 ms       P-R-T Axes :  45 -17  22 degrees      QTc Int : 457 ms      Normal sinus rhythm   Possible Left atrial enlargement   Left ventricular hypertrophy   Nonspecific ST abnormality   Abnormal ECG   When compared with ECG of 23-JUL-2022 13:21,   No significant change was found      Referred By: EDMD           Confirmed By:                     Medical Decision Making  Problems Addressed:  Chest pain, unspecified type: complicated acute illness or injury  Fabry disease: complicated acute illness or injury    Amount and/or Complexity of Data Reviewed  Labs: ordered. Decision-making details documented in ED Course.  Radiology: ordered. Decision-making details documented in ED Course.  ECG/medicine tests: ordered.    Risk  OTC drugs.  Prescription drug management.        Final diagnoses:   Chest pain, unspecified type   Fabry disease       ED Disposition  ED Disposition       ED Disposition   Discharge    Condition   Stable    Comment   --               [facility] CARDIOLOGY  [address]  [address]  [facility] [address] [phone]             Medication List      No changes were made to your prescriptions  during this visit.            Josh Yen PA  05/01/24 0800

## 2024-05-03 ENCOUNTER — OFFICE VISIT (OUTPATIENT)
Dept: CARDIOLOGY | Facility: HOSPITAL | Age: 54
End: 2024-05-03
Payer: MEDICAID

## 2024-05-03 VITALS
HEART RATE: 111 BPM | OXYGEN SATURATION: 96 % | DIASTOLIC BLOOD PRESSURE: 60 MMHG | WEIGHT: 293 LBS | HEIGHT: 66 IN | SYSTOLIC BLOOD PRESSURE: 139 MMHG | BODY MASS INDEX: 47.09 KG/M2

## 2024-05-03 DIAGNOSIS — I10 PRIMARY HYPERTENSION: ICD-10-CM

## 2024-05-03 DIAGNOSIS — Z95.0 CARDIAC PACEMAKER IN SITU: ICD-10-CM

## 2024-05-03 DIAGNOSIS — E87.79 OTHER HYPERVOLEMIA: Primary | ICD-10-CM

## 2024-05-03 LAB
QT INTERVAL: 378 MS
QT INTERVAL: 406 MS
QTC INTERVAL: 457 MS
QTC INTERVAL: 471 MS

## 2024-05-03 RX ORDER — SPIRONOLACTONE 50 MG/1
50 TABLET, FILM COATED ORAL DAILY
Qty: 30 TABLET | Refills: 3 | Status: SHIPPED | OUTPATIENT
Start: 2024-05-03

## 2024-05-03 RX ORDER — AMLODIPINE BESYLATE 10 MG/1
10 TABLET ORAL DAILY
COMMUNITY

## 2024-05-03 RX ORDER — SUMATRIPTAN 100 MG/1
100 TABLET, FILM COATED ORAL 2 TIMES DAILY PRN
COMMUNITY

## 2024-05-03 NOTE — PROGRESS NOTES
"Chief Complaint  Establish Care (Fluid overload )    Subjective    History of Present Illness {CC  Problem List  Visit  Diagnosis   Encounters  Notes  Medications  Labs  Result Review Imaging  Media :23}       History of Present Illness   54-year-old female presents the office today at the request of [facility] ED for ongoing evaluation of her recent chest pain and fluid overload.  Previously followed by Dr. White at [facility] and has moved back to [place] from being in [place] for the last 2 years.  While in [place] she underwent implantation of [employer] pacemaker on May 26, 2023 per Dr.Asim Redding in [place]. Notes no further syncopal spells. Notes that she presented to ED recently with pain in her chest and she notes that she has been experiencing fluid overload and notes she experiences pain in her chest with fluid overload.  Recent echo shows normal EF, no diastolic dysfunction and normal valvular function.  Objective     Vital Signs:   Vitals:    05/03/24 0831 05/03/24 0832   BP: 131/69 139/60   BP Location: Left arm Left arm   Patient Position: Sitting Standing   Pulse: 94 111   SpO2: 96%    Weight: (!) 160 kg (353 lb) (!) 160 kg (353 lb)   Height: 167.6 cm (66\") 167.6 cm (66\")     Body mass index is 56.98 kg/m².  Physical Exam  Vitals and nursing note reviewed.   Constitutional:       Appearance: Normal appearance. She is obese.   HENT:      Head: Normocephalic.   Eyes:      Pupils: Pupils are equal, round, and reactive to light.   Cardiovascular:      Rate and Rhythm: Normal rate and regular rhythm.      Pulses: Normal pulses.      Heart sounds: Normal heart sounds. No murmur heard.  Pulmonary:      Effort: Pulmonary effort is normal.      Breath sounds: Normal breath sounds.   Abdominal:      General: Bowel sounds are normal.      Palpations: Abdomen is soft.   Musculoskeletal:         General: Normal range of motion.      Cervical back: Normal range of motion.      Right lower leg: No " edema.      Left lower leg: No edema.   Skin:     General: Skin is warm and dry.      Capillary Refill: Capillary refill takes less than 2 seconds.   Neurological:      Mental Status: She is alert and oriented to person, place, and time.   Psychiatric:         Mood and Affect: Mood normal.         Thought Content: Thought content normal.              Result Review  Data Reviewed:{ Labs  Result Review  Imaging  Med Tab  Media :23}     Pacemaker interrogation with [employer] rep today showed that patient is RA pacing 13% of the time mode DDDR rate of 60, battery 10.6 years underlying rhythm 95.  Patient did have 1 brief episode of SVT and 1 brief episode of nonsustained VT.  High Sensitivity Troponin T (04/29/2024 13:39)  CBC & Differential (04/29/2024 13:39)  Comprehensive Metabolic Panel (04/29/2024 13:39)  Lipase (04/29/2024 13:39)  BNP (04/29/2024 13:39)  High Sensitivity Troponin T 2Hr (04/29/2024 15:59)    ECG 12 Lead ED Triage Standing Order; Chest Pain (04/29/2024 13:27)  ECG 12 Lead ED Triage Standing Order; Chest Pain (04/29/2024 15:57)     Assessment and Plan {CC Problem List  Visit Diagnosis  ROS  Review (Popup)  Health Maintenance  Quality  BestPractice  Medications  SmartSets  SnapShot Encounters  Media :23}   1. Other hypervolemia  Continue Lasix 20 mg twice daily  increase Aldactone to 50 mg daily  2. Cardiac pacemaker in situ  Pacemaker interrogation completed today  Ambulatory  referral to cardiology  3. Primary hypertension  Stable on norvasc, losartan, nifedipine        Follow Up {Instructions Charge Capture  Follow-up Communications :23}   No follow-ups on file.    Patient was given instructions and counseling regarding her condition or for health maintenance advice. Please see specific information pulled into the AVS if appropriate.  Patient was instructed to call the [facility] with any questions, concerns, or worsening symptoms.

## 2024-05-05 ENCOUNTER — DOCUMENTATION (OUTPATIENT)
Dept: CARDIOLOGY | Facility: HOSPITAL | Age: 54
End: 2024-05-05
Payer: MEDICAID

## 2024-05-08 ENCOUNTER — TELEMEDICINE (OUTPATIENT)
Dept: PSYCHIATRY | Facility: CLINIC | Age: 54
End: 2024-05-08
Payer: MEDICAID

## 2024-05-08 DIAGNOSIS — F41.9 ANXIETY DISORDER, UNSPECIFIED TYPE: Chronic | ICD-10-CM

## 2024-05-08 DIAGNOSIS — G47.9 SLEEPING DIFFICULTIES: ICD-10-CM

## 2024-05-08 DIAGNOSIS — F33.1 MAJOR DEPRESSIVE DISORDER, RECURRENT EPISODE, MODERATE: Primary | Chronic | ICD-10-CM

## 2024-05-08 RX ORDER — SERTRALINE HYDROCHLORIDE 100 MG/1
100 TABLET, FILM COATED ORAL DAILY
Qty: 30 TABLET | Refills: 0 | Status: SHIPPED | OUTPATIENT
Start: 2024-05-08

## 2024-06-14 ENCOUNTER — TELEMEDICINE (OUTPATIENT)
Dept: PSYCHIATRY | Facility: CLINIC | Age: 54
End: 2024-06-14
Payer: MEDICAID

## 2024-06-14 DIAGNOSIS — F33.1 MAJOR DEPRESSIVE DISORDER, RECURRENT EPISODE, MODERATE: Primary | ICD-10-CM

## 2024-06-14 DIAGNOSIS — F41.9 ANXIETY DISORDER, UNSPECIFIED TYPE: ICD-10-CM

## 2024-06-14 DIAGNOSIS — G47.9 SLEEPING DIFFICULTIES: ICD-10-CM

## 2024-06-14 DIAGNOSIS — F43.10 POST TRAUMATIC STRESS DISORDER (PTSD): ICD-10-CM

## 2024-06-14 NOTE — PATIENT INSTRUCTIONS
[place] warm Line: Phone number: [phone]      Monday through Friday 10 AM to 9 PM and Saturdays 5 PM to 9 PM.      A warmline is a NON-CRISIS number to call to get emotional support. You can call to have a conversation with someone who can provide support during hard times. Warmlines are staffed by trained peers who have been through their own mental health struggles and know what it's like to need help.      -Suicide hotline number: 988      -[facility] Resource Connection      The resource line is dedicated to providing behavioral health resources to residents of [place] and [facility], seven days a week, 7 a.m.-7 p.m.      [phone]      [email or web address]  [facility] [email or web address]      Should you ever need assistance or just want to reach out to someone when your behavioral health provider is not available due to the office being closed you can contact [email or web address].       -Just text HOME to 330409 and someone will reach out to you within a few minutes.  This is a 24/7 help line and they are open even on holidays.

## 2024-06-14 NOTE — PROGRESS NOTES
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 to address Pt's presenting problems. Pt would benefit from gaining a better understanding of their issues and learning coping skills and therapeutic tools to assist Pt in alleviating symptoms and improve daily functioning.    Ambulatory Referral Made:  No      Visit Diagnoses:    ICD-10-CM ICD-9-CM   1. Major depressive disorder, recurrent episode, moderate  F33.1 296.32   2. Anxiety disorder, unspecified type  F41.9 300.00   3. Sleeping difficulties  G47.9 780.50   4. Post traumatic stress disorder (PTSD)  F43.10 309.81          Functional Status: Severe impairment      Treatment Plan: Continue supportive psychotherapy efforts and medications as indicated. Obtain release of information for current treatment team for continuity of care as needed. Patient will adhere to medication regimen as prescribed and report any side effects. Patient will contact this office, call 911 or present to the nearest emergency room should suicidal or homicidal ideations occur.    Short Term Goals: Patient will be compliant with medication, and patient will have no significant medication related side effects.  Patient will be engaged in  psychotherapy as indicated. Pt will complete homework as discussed and agreed upon with Counselor.  Pt will practice learned skills on their own and report success/issues at follow up appointment.  Patient will report subjective improvement of symptoms.    Long Term Goals: To stabilize mood and treat/improve subjective symptoms, the patient will stay out of the hospital, the patient will be at an optimal level of functioning, and the patient will take all medications as prescribed.The patient verbalized understanding and agreement with goals that were mutually set.    Crisis Plan:    If symptoms/behaviors persist, patient will present to the nearest hospital for an assessment. Advised patient of Psychiatric 24/7 assessment services.         This document has been electronically signed by GREGORY Adrian  June 14, 2024 10:59 EDT     Part of this note may be an electronic transcription/translation of spoken language to printed text using the Dragon Dictation System.

## 2024-06-17 DIAGNOSIS — F41.9 ANXIETY DISORDER, UNSPECIFIED TYPE: Chronic | ICD-10-CM

## 2024-06-17 DIAGNOSIS — F33.1 MAJOR DEPRESSIVE DISORDER, RECURRENT EPISODE, MODERATE: Chronic | ICD-10-CM

## 2024-06-17 RX ORDER — SERTRALINE HYDROCHLORIDE 100 MG/1
100 TABLET, FILM COATED ORAL DAILY
Qty: 30 TABLET | Refills: 0 | Status: SHIPPED | OUTPATIENT
Start: 2024-06-17

## 2024-07-03 DIAGNOSIS — I50.9 CHRONIC HEART FAILURE, UNSPECIFIED HEART FAILURE TYPE: ICD-10-CM

## 2024-07-03 DIAGNOSIS — E06.3 HASHIMOTO'S DISEASE: ICD-10-CM

## 2024-07-03 DIAGNOSIS — E03.9 ACQUIRED HYPOTHYROIDISM: ICD-10-CM

## 2024-07-03 DIAGNOSIS — I10 ESSENTIAL HYPERTENSION: ICD-10-CM

## 2024-07-03 DIAGNOSIS — E78.5 HYPERLIPIDEMIA, UNSPECIFIED HYPERLIPIDEMIA TYPE: ICD-10-CM

## 2024-07-05 RX ORDER — ROSUVASTATIN CALCIUM 5 MG/1
5 TABLET, COATED ORAL DAILY
Qty: 90 TABLET | Refills: 0 | Status: SHIPPED | OUTPATIENT
Start: 2024-07-05

## 2024-07-05 RX ORDER — LEVOTHYROXINE SODIUM 175 UG/1
175 TABLET ORAL DAILY
Qty: 90 TABLET | Refills: 0 | Status: SHIPPED | OUTPATIENT
Start: 2024-07-05

## 2024-07-05 RX ORDER — FUROSEMIDE 20 MG/1
20 TABLET ORAL DAILY
Qty: 30 TABLET | Refills: 0 | Status: SHIPPED | OUTPATIENT
Start: 2024-07-05

## 2024-07-13 DIAGNOSIS — I10 ESSENTIAL HYPERTENSION: ICD-10-CM

## 2024-07-13 DIAGNOSIS — E78.1 HYPERTRIGLYCERIDEMIA: ICD-10-CM

## 2024-07-13 DIAGNOSIS — E11.40 TYPE 2 DIABETES MELLITUS WITH DIABETIC NEUROPATHY, WITHOUT LONG-TERM CURRENT USE OF INSULIN: ICD-10-CM

## 2024-07-15 NOTE — TELEPHONE ENCOUNTER
Rx Refill Note  Requested Prescriptions     Pending Prescriptions Disp Refills    metFORMIN (GLUCOPHAGE) 500 MG tablet [Pharmacy Med Name: METFORMIN  MG TABLET] 60 tablet 3     Sig: TAKE 1 TABLET BY MOUTH TWICE A DAY WITH A MEAL    metoprolol tartrate (LOPRESSOR) 25 MG tablet [Pharmacy Med Name: METOPROLOL TARTRATE 25 MG TAB] 60 tablet 2     Sig: TAKE 1 TABLET BY MOUTH 2 TIMES A DAY    gemfibrozil (LOPID) 600 MG tablet [Pharmacy Med Name: GEMFIBROZIL 600 MG TABLET] 30 tablet 2     Sig: TAKE 1 TABLET BY MOUTH DAILY    losartan (COZAAR) 100 MG tablet [Pharmacy Med Name: LOSARTAN POTASSIUM 100 MG TAB] 30 tablet 2     Sig: TAKE 1 TABLET BY MOUTH DAILY      Last office visit with prescribing clinician: 4/5/2024   Last telemedicine visit with prescribing clinician: Visit date not found   Next office visit with prescribing clinician: Visit date not found                         Would you like a call back once the refill request has been completed: [] Yes [] No    If the office needs to give you a call back, can they leave a voicemail: [] Yes [] No    Ruth Argueta MA  07/15/24, 12:34 EDT

## 2024-07-23 RX ORDER — LOSARTAN POTASSIUM 100 MG/1
100 TABLET ORAL DAILY
Qty: 90 TABLET | Refills: 0 | Status: SHIPPED | OUTPATIENT
Start: 2024-07-23

## 2024-07-23 RX ORDER — GEMFIBROZIL 600 MG/1
600 TABLET, FILM COATED ORAL DAILY
Qty: 90 TABLET | Refills: 0 | Status: SHIPPED | OUTPATIENT
Start: 2024-07-23

## 2024-07-26 ENCOUNTER — LAB (OUTPATIENT)
Dept: LAB | Facility: HOSPITAL | Age: 54
End: 2024-07-26
Payer: MEDICAID

## 2024-07-26 DIAGNOSIS — E11.40 TYPE 2 DIABETES MELLITUS WITH DIABETIC NEUROPATHY, WITHOUT LONG-TERM CURRENT USE OF INSULIN: ICD-10-CM

## 2024-07-26 LAB
ALBUMIN SERPL-MCNC: 4.3 G/DL (ref 3.5–5.2)
ALBUMIN/GLOB SERPL: 1.3 G/DL
ALP SERPL-CCNC: 121 U/L (ref 39–117)
ALT SERPL W P-5'-P-CCNC: 13 U/L (ref 1–33)
ANION GAP SERPL CALCULATED.3IONS-SCNC: 11.5 MMOL/L (ref 5–15)
AST SERPL-CCNC: 16 U/L (ref 1–32)
BILIRUB SERPL-MCNC: 0.4 MG/DL (ref 0–1.2)
BUN SERPL-MCNC: 19 MG/DL (ref 6–20)
BUN/CREAT SERPL: 14.2 (ref 7–25)
CALCIUM SPEC-SCNC: 9.8 MG/DL (ref 8.6–10.5)
CHLORIDE SERPL-SCNC: 100 MMOL/L (ref 98–107)
CO2 SERPL-SCNC: 27.5 MMOL/L (ref 22–29)
CREAT SERPL-MCNC: 1.34 MG/DL (ref 0.57–1)
EGFRCR SERPLBLD CKD-EPI 2021: 47.8 ML/MIN/1.73
GLOBULIN UR ELPH-MCNC: 3.3 GM/DL
GLUCOSE SERPL-MCNC: 122 MG/DL (ref 65–99)
POTASSIUM SERPL-SCNC: 4.4 MMOL/L (ref 3.5–5.2)
PROT SERPL-MCNC: 7.6 G/DL (ref 6–8.5)
SODIUM SERPL-SCNC: 139 MMOL/L (ref 136–145)

## 2024-07-26 PROCEDURE — 80053 COMPREHEN METABOLIC PANEL: CPT

## 2024-07-31 ENCOUNTER — LAB (OUTPATIENT)
Dept: LAB | Facility: HOSPITAL | Age: 54
End: 2024-07-31
Payer: COMMERCIAL

## 2024-07-31 ENCOUNTER — OFFICE VISIT (OUTPATIENT)
Dept: FAMILY MEDICINE CLINIC | Facility: CLINIC | Age: 54
End: 2024-07-31
Payer: COMMERCIAL

## 2024-07-31 VITALS
HEIGHT: 66 IN | WEIGHT: 293 LBS | DIASTOLIC BLOOD PRESSURE: 84 MMHG | BODY MASS INDEX: 47.09 KG/M2 | HEART RATE: 74 BPM | TEMPERATURE: 98.2 F | SYSTOLIC BLOOD PRESSURE: 140 MMHG | OXYGEN SATURATION: 96 %

## 2024-07-31 DIAGNOSIS — E11.40 TYPE 2 DIABETES MELLITUS WITH DIABETIC NEUROPATHY, WITHOUT LONG-TERM CURRENT USE OF INSULIN: ICD-10-CM

## 2024-07-31 DIAGNOSIS — E75.21 FABRY'S DISEASE: ICD-10-CM

## 2024-07-31 DIAGNOSIS — E78.5 HYPERLIPIDEMIA, UNSPECIFIED HYPERLIPIDEMIA TYPE: ICD-10-CM

## 2024-07-31 DIAGNOSIS — G47.01 INSOMNIA DUE TO MEDICAL CONDITION: Primary | ICD-10-CM

## 2024-07-31 DIAGNOSIS — E78.1 HYPERTRIGLYCERIDEMIA: ICD-10-CM

## 2024-07-31 DIAGNOSIS — I48.91 ATRIAL FIBRILLATION, UNSPECIFIED TYPE: ICD-10-CM

## 2024-07-31 DIAGNOSIS — I10 ESSENTIAL HYPERTENSION: ICD-10-CM

## 2024-07-31 DIAGNOSIS — I50.9 CHRONIC HEART FAILURE, UNSPECIFIED HEART FAILURE TYPE: ICD-10-CM

## 2024-07-31 DIAGNOSIS — Z95.0 PACEMAKER: ICD-10-CM

## 2024-07-31 DIAGNOSIS — F41.9 ANXIETY AND DEPRESSION: ICD-10-CM

## 2024-07-31 DIAGNOSIS — F32.A ANXIETY AND DEPRESSION: ICD-10-CM

## 2024-07-31 DIAGNOSIS — E06.3 HASHIMOTO'S DISEASE: ICD-10-CM

## 2024-07-31 DIAGNOSIS — D35.2 PITUITARY MACROADENOMA: ICD-10-CM

## 2024-07-31 DIAGNOSIS — Z86.718 HISTORY OF DVT OF LOWER EXTREMITY: ICD-10-CM

## 2024-07-31 LAB — HBA1C MFR BLD: 6.4 % (ref 4.8–5.6)

## 2024-07-31 PROCEDURE — 80061 LIPID PANEL: CPT

## 2024-07-31 PROCEDURE — 83036 HEMOGLOBIN GLYCOSYLATED A1C: CPT

## 2024-07-31 PROCEDURE — 99214 OFFICE O/P EST MOD 30 MIN: CPT | Performed by: FAMILY MEDICINE

## 2024-07-31 RX ORDER — FUROSEMIDE 20 MG/1
20 TABLET ORAL DAILY
Qty: 90 TABLET | Refills: 1 | Status: SHIPPED | OUTPATIENT
Start: 2024-07-31

## 2024-07-31 RX ORDER — TRAZODONE HYDROCHLORIDE 50 MG/1
50 TABLET ORAL NIGHTLY
Qty: 90 TABLET | Refills: 0 | Status: SHIPPED | OUTPATIENT
Start: 2024-07-31

## 2024-07-31 RX ORDER — SPIRONOLACTONE 25 MG/1
25 TABLET ORAL DAILY
Qty: 90 TABLET | Refills: 0 | Status: SHIPPED | OUTPATIENT
Start: 2024-07-31

## 2024-07-31 RX ORDER — HYDROCHLOROTHIAZIDE 25 MG/1
TABLET ORAL
COMMUNITY
Start: 2024-07-03

## 2024-07-31 NOTE — PROGRESS NOTES
Follow Up Office Visit      Date: 2024   Patient Name: Jazmín Hicks  : 1970   MRN: [id number]     Chief Complaint:    Chief Complaint   Patient presents with    Hypertension    Results       History of Present Illness: Jazmín Hicks is a 54 y.o. female who presents today for follow-up.  Patient had been seeing BONITA Corona for PCP.  Provider has left the office at [facility].  I had seen the patient once on 2024.    Reports having more trouble with falling and balance.  Using cane more.    Has a tub with a shower. Old metal tub.   Has trouble lifting legs up to get into and out of the tub.    Reports did have a fall two Saturdays ago getting out of the shower.  Reports her sister was gone at the time.  Lives with sister.  Patient reports her sister helps her at home.  Reports she has had some blurry vision since.  Reports has not seen an eye doctor.    Has seen Behavioral.  Reports they increased Zoloft.  Seeing Bridgette Vasquez and a counselor[facility].    Has an appt upcoming with Cardiology.    Appt upcoming with [facility] per patient report at well.    Patient had not been seen by Isabel for some time.  Last seen 2022.  Patient was noted to have been noncompliant with appointments and medications.    Patient had reported previous MI and went to [place] for a year following that.  Had reported having pacemaker placed while in [place].  Reported she had went to stay with one of her sisters.    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  Had also reported pituitary microadenoma.    History includes DVT, hyperlipidemia, Hashimoto's, CAD, diabetes.    Former smoker quit .    Had reported she was started on Lyrica due to neuropathy and started since February was discovered.    Had reported had been just monitoring pituitary tumor for now.  Had reported needing neurosurgery locally.    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    Now working for [employer] office as a .    Seeing psych and is prescribed Zoloft.    Had previously ordered labs done on 7/26/2024.  Labs showed creatinine 1.34, glucose 122,      Reports she has had more trouble sleeping.        Subjective      Review of Systems:   Review of Systems   Constitutional:  Negative for chills and fever.   Gastrointestinal:  Negative for nausea and vomiting.   Neurological:  Negative for seizures and syncope.   Psychiatric/Behavioral:  Negative for suicidal ideas.        I have reviewed the patients family history, social history, past medical history, past surgical history and have updated it as appropriate.     Medications:     Current Outpatient Medications:     albuterol sulfate  (90 Base) MCG/ACT inhaler, Inhale 2 puffs Every 4 (Four) Hours As Needed for Shortness of Air., Disp: 18 g, Rfl: 0    amLODIPine (NORVASC) 10 MG tablet, Take 1 tablet by mouth Daily., Disp: , Rfl:     aspirin 325 MG EC tablet, Take 1 tablet by mouth Every 6 (Six) Hours As Needed for Mild Pain., Disp: , Rfl:     cetirizine (zyrTEC) 10 MG tablet, Take 1 tablet by mouth Daily., Disp: , Rfl:     colestipol (COLESTID) 1 g tablet, Take 2 tablets by mouth 2 (Two) Times a Day. Titrate to effectiveness. Do not take within 2 hours of other medications, Disp: 120 tablet, Rfl: 5    Eliquis 5 MG tablet tablet, Take 1 tablet by mouth Every 12 (Twelve) Hours., Disp: 60 tablet, Rfl: 5    furosemide (LASIX) 20 MG tablet, Take 1 tablet by mouth Daily., Disp: 90 tablet, Rfl: 1    gemfibrozil (LOPID) 600 MG tablet, Take 1 tablet by mouth Daily. (Patient taking differently: Take 1 tablet by mouth 2 (Two) Times a Day.), Disp: 90 tablet, Rfl: 0    hydroCHLOROthiazide 25 MG tablet, , Disp: , Rfl:     levothyroxine (SYNTHROID, LEVOTHROID) 175 MCG tablet, TAKE 1 TABLET BY MOUTH DAILY, Disp: 90 tablet, Rfl: 0    losartan (COZAAR) 100 MG tablet, TAKE 1 TABLET BY MOUTH DAILY,  Disp: 90 tablet, Rfl: 0    meloxicam (MOBIC) 7.5 MG tablet, Take 1 tablet by mouth Daily., Disp: 30 tablet, Rfl: 2    metFORMIN (GLUCOPHAGE) 500 MG tablet, TAKE 1 TABLET BY MOUTH TWICE A DAY WITH A MEAL, Disp: 180 tablet, Rfl: 1    metoprolol tartrate (LOPRESSOR) 25 MG tablet, TAKE 1 TABLET BY MOUTH 2 TIMES A DAY, Disp: 180 tablet, Rfl: 0    multivitamin with minerals (MULTIVITAMIN ADULT PO), Take 1 tablet by mouth Daily. Vitafusion, Disp: , Rfl:     NIFEdipine CC (ADALAT CC) 60 MG 24 hr tablet, TAKE 1 TABLET BY MOUTH DAILY, Disp: 90 tablet, Rfl: 0    nitroglycerin (NITROLINGUAL) 0.4 MG/SPRAY spray, Place 1 spray under the tongue Every 5 (Five) Minutes As Needed for Chest Pain. Repeat up to three times, then go to ER., Disp: 12 g, Rfl: 1    omeprazole (priLOSEC) 20 MG capsule, Take 1 capsule by mouth Daily., Disp: , Rfl:     pregabalin (LYRICA) 150 MG capsule, 150 mg in am and 300 mg in evening., Disp: 90 capsule, Rfl: 2    rosuvastatin (CRESTOR) 5 MG tablet, TAKE 1 TABLET BY MOUTH DAILY, Disp: 90 tablet, Rfl: 0    sertraline (ZOLOFT) 100 MG tablet, TAKE 1 TABLET BY MOUTH DAILY, Disp: 30 tablet, Rfl: 0    spironolactone (Aldactone) 25 MG tablet, Take 1 tablet by mouth Daily., Disp: 90 tablet, Rfl: 0    SUMAtriptan (IMITREX) 100 MG tablet, Take 1 tablet by mouth 2 (Two) Times a Day As Needed for Migraine. Take one tablet at onset of headache. May repeat dose one time in 2 hours if headache not relieved., Disp: , Rfl:     traZODone (DESYREL) 50 MG tablet, Take 1 tablet by mouth Every Night., Disp: 90 tablet, Rfl: 0    Allergies:   Allergies   Allergen Reactions    Capsaicin Shortness Of Breath    Adhesive Tape Other (See Comments)     blisters    Darvon [Propoxyphene] GI Intolerance    Lisinopril Nausea Only       Objective     Physical Exam: Please see above  Vital Signs:   Vitals:    07/31/24 1140   BP: 140/84   Pulse: 74   Temp: 98.2 °F (36.8 °C)   TempSrc: Temporal   SpO2: 96%   Weight: (!) 171 kg (376 lb 9.6 oz)  "  Height: 167.6 cm (65.98\")   PainSc:   6   PainLoc: Leg     Body mass index is 60.81 kg/m².          Physical Exam  Vitals and nursing note reviewed.   Constitutional:       Appearance: Normal appearance.   HENT:      Head: Normocephalic and atraumatic.      Right Ear: Tympanic membrane normal.      Left Ear: Tympanic membrane normal.   Neck:      Vascular: No carotid bruit.   Cardiovascular:      Rate and Rhythm: Normal rate and regular rhythm.      Heart sounds: Normal heart sounds. No murmur heard.  Pulmonary:      Effort: Pulmonary effort is normal.      Breath sounds: Normal breath sounds.   Abdominal:      General: Bowel sounds are normal.      Palpations: Abdomen is soft. There is no mass.      Tenderness: There is no abdominal tenderness.   Musculoskeletal:      Right lower leg: No edema.      Left lower leg: No edema.   Skin:     Coloration: Skin is not jaundiced or pale.      Findings: No erythema.   Neurological:      Mental Status: She is alert. Mental status is at baseline.   Psychiatric:         Mood and Affect: Mood normal.         Behavior: Behavior normal.         Procedures    Results:   Labs:   Hemoglobin A1C   Date Value Ref Range Status   07/31/2024 6.40 (H) 4.80 - 5.60 % Final     TSH   Date Value Ref Range Status   04/05/2024 2.370 0.270 - 4.200 uIU/mL Final        POCT Results (if applicable):   Results for orders placed or performed in visit on 07/26/24   Comprehensive Metabolic Panel    Specimen: Blood   Result Value Ref Range    Glucose 122 (H) 65 - 99 mg/dL    BUN 19 6 - 20 mg/dL    Creatinine 1.34 (H) 0.57 - 1.00 mg/dL    Sodium 139 136 - 145 mmol/L    Potassium 4.4 3.5 - 5.2 mmol/L    Chloride 100 98 - 107 mmol/L    CO2 27.5 22.0 - 29.0 mmol/L    Calcium 9.8 8.6 - 10.5 mg/dL    Total Protein 7.6 6.0 - 8.5 g/dL    Albumin 4.3 3.5 - 5.2 g/dL    ALT (SGPT) 13 1 - 33 U/L    AST (SGOT) 16 1 - 32 U/L    Alkaline Phosphatase 121 (H) 39 - 117 U/L    Total Bilirubin 0.4 0.0 - 1.2 mg/dL    " Globulin 3.3 gm/dL    A/G Ratio 1.3 g/dL    BUN/Creatinine Ratio 14.2 7.0 - 25.0    Anion Gap 11.5 5.0 - 15.0 mmol/L    eGFR 47.8 (L) >60.0 mL/min/1.73       Imaging:   No valid procedures specified.     Measures:   Advanced Care Planning:   Patient does not have an advance directive, declines further assistance.    Smoking Cessation:   Does not smoke  ]    Assessment / Plan      Assessment/Plan:       Form for handicap placard filled out.    Ref to Gyn when due for pap.  Done in 2023.  Reports was 3 year for follow-up.  Reports she was seen in [place].    Some medications refilled prior to visit due to scheduling.      1. Insomnia due to medical condition  Will start Trazodone for sleep instead of otc sleep aids.  - traZODone (DESYREL) 50 MG tablet; Take 1 tablet by mouth Every Night.  Dispense: 90 tablet; Refill: 0    2. Essential hypertension  Discussed importance of following low-salt diet for blood pressure control.  Chronic and currently well-controlled on current regimen.  Continue nifedipine, hydrochlorothiazide, metoprolol ,Norvasc Lasix    3. Atrial fibrillation, unspecified type  Patient to continue to follow with cardiology.  Takes Eliquis.      4. Pacemaker  Patient to continue to follow with cardiology.    5. Chronic heart failure, unspecified heart failure type  Patient to continue to follow-up with cardiology.  Continue Lasix, and spironolactone.  Will decrease spironolactone to 25 mg daily.    - spironolactone (Aldactone) 25 MG tablet; Take 1 tablet by mouth Daily.  Dispense: 90 tablet; Refill: 0  - furosemide (LASIX) 20 MG tablet; Take 1 tablet by mouth Daily.  Dispense: 90 tablet; Refill: 1    6. Hyperlipidemia, unspecified hyperlipidemia type  Continue Lopid, and Crestor.  Fasting lipid panel.    7. History of DVT of lower extremity  Patient to continue Eliquis    8. Hypertriglyceridemia  Check fasting lipid panel.  - Lipid Panel; Future    9. Type 2 diabetes mellitus with diabetic neuropathy,  without long-term current use of insulin  Discussed importance of portion control and making good food choices for diabetes control.  Check A1c.  A1c in office on 7/31/2024 was 6.4  Continue metformin.    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      11. Hashimoto's disease  Continue Synthroid  TSH was 2.370 on 4/5/2024.    12. Anxiety and depression  Patient to continue to follow with psych    13. Pituitary macroadenoma  Patient to continue to follow-up with neurosurgery          Part of this note may be an electronic transcription/translation of spoken language to printed text using the Dragon Dictation System.        [facility]:      Follow Up:   Return in about 3 months (around 10/31/2024).      DO TANA Herr

## 2024-07-31 NOTE — PATIENT INSTRUCTIONS
Fasting labs today.  Decrease Spironolactone to 25 mg daily.  Start Trazodone 50 mg at bedtime.   Keep appts with specialists.  Take medications as ordered.  Low fat, low salt diet.  Exercise as tolerated.  Portion control and make good food choices.

## 2024-08-01 LAB
CHOLEST SERPL-MCNC: 161 MG/DL (ref 0–200)
HDLC SERPL-MCNC: 29 MG/DL (ref 40–60)
LDLC SERPL CALC-MCNC: 100 MG/DL (ref 0–100)
LDLC/HDLC SERPL: 3.28 {RATIO}
TRIGL SERPL-MCNC: 185 MG/DL (ref 0–150)
VLDLC SERPL-MCNC: 32 MG/DL (ref 5–40)

## 2024-08-07 DIAGNOSIS — G62.9 NEUROPATHY: ICD-10-CM

## 2024-08-07 DIAGNOSIS — M79.7 FIBROMYALGIA: ICD-10-CM

## 2024-08-08 RX ORDER — PREGABALIN 150 MG/1
CAPSULE ORAL
Qty: 90 CAPSULE | Refills: 2 | Status: SHIPPED | OUTPATIENT
Start: 2024-08-08

## 2024-09-06 DIAGNOSIS — I50.9 CHRONIC HEART FAILURE, UNSPECIFIED HEART FAILURE TYPE: ICD-10-CM

## 2024-09-06 RX ORDER — SPIRONOLACTONE 25 MG/1
25 TABLET ORAL DAILY
Qty: 30 TABLET | Refills: 2 | Status: SHIPPED | OUTPATIENT
Start: 2024-09-06

## 2024-09-07 DIAGNOSIS — M25.50 ARTHRALGIA, UNSPECIFIED JOINT: ICD-10-CM

## 2024-09-10 RX ORDER — MELOXICAM 7.5 MG/1
7.5 TABLET ORAL DAILY
Qty: 90 TABLET | Refills: 0 | Status: SHIPPED | OUTPATIENT
Start: 2024-09-10

## 2024-10-13 DIAGNOSIS — Z86.718 HISTORY OF DVT OF LOWER EXTREMITY: ICD-10-CM

## 2024-10-14 RX ORDER — APIXABAN 5 MG/1
5 TABLET, FILM COATED ORAL EVERY 12 HOURS
Qty: 60 TABLET | Refills: 5 | Status: SHIPPED | OUTPATIENT
Start: 2024-10-14

## 2024-11-14 DIAGNOSIS — G47.01 INSOMNIA DUE TO MEDICAL CONDITION: ICD-10-CM

## 2024-11-15 RX ORDER — TRAZODONE HYDROCHLORIDE 50 MG/1
50 TABLET, FILM COATED ORAL NIGHTLY
Qty: 30 TABLET | Refills: 2 | Status: SHIPPED | OUTPATIENT
Start: 2024-11-15

## 2024-11-17 DIAGNOSIS — E78.1 HYPERTRIGLYCERIDEMIA: ICD-10-CM

## 2024-11-17 DIAGNOSIS — I10 ESSENTIAL HYPERTENSION: ICD-10-CM

## 2024-11-18 RX ORDER — GEMFIBROZIL 600 MG/1
600 TABLET, FILM COATED ORAL DAILY
Qty: 30 TABLET | Refills: 0 | Status: SHIPPED | OUTPATIENT
Start: 2024-11-18

## 2024-11-18 RX ORDER — METOPROLOL TARTRATE 25 MG/1
25 TABLET, FILM COATED ORAL 2 TIMES DAILY
Qty: 60 TABLET | Refills: 0 | Status: SHIPPED | OUTPATIENT
Start: 2024-11-18

## 2024-11-18 RX ORDER — LOSARTAN POTASSIUM 100 MG/1
100 TABLET ORAL DAILY
Qty: 30 TABLET | Refills: 0 | Status: SHIPPED | OUTPATIENT
Start: 2024-11-18

## 2024-11-20 DIAGNOSIS — M79.7 FIBROMYALGIA: ICD-10-CM

## 2024-11-20 DIAGNOSIS — G62.9 NEUROPATHY: ICD-10-CM

## 2024-11-20 RX ORDER — PREGABALIN 150 MG/1
CAPSULE ORAL
Qty: 90 CAPSULE | Refills: 0 | Status: SHIPPED | OUTPATIENT
Start: 2024-11-20

## 2024-11-20 NOTE — TELEPHONE ENCOUNTER
Rx Refill Note  Requested Prescriptions     Pending Prescriptions Disp Refills    pregabalin (LYRICA) 150 MG capsule 90 capsule 2     Si mg in am and 300 mg in evening.      Last office visit with prescribing clinician: 2024   Last telemedicine visit with prescribing clinician: Visit date not found   Next office visit with prescribing clinician: 2024                         Would you like a call back once the refill request has been completed: [] Yes [] No    If the office needs to give you a call back, can they leave a voicemail: [] Yes [] No    Ruth Argueta MA  24, 15:03 EST

## 2024-12-04 ENCOUNTER — LAB (OUTPATIENT)
Dept: LAB | Facility: HOSPITAL | Age: 54
End: 2024-12-04
Payer: COMMERCIAL

## 2024-12-04 ENCOUNTER — OFFICE VISIT (OUTPATIENT)
Dept: FAMILY MEDICINE CLINIC | Facility: CLINIC | Age: 54
End: 2024-12-04
Payer: COMMERCIAL

## 2024-12-04 VITALS
DIASTOLIC BLOOD PRESSURE: 88 MMHG | BODY MASS INDEX: 47.09 KG/M2 | HEIGHT: 66 IN | WEIGHT: 293 LBS | SYSTOLIC BLOOD PRESSURE: 140 MMHG | TEMPERATURE: 97.1 F | OXYGEN SATURATION: 94 % | HEART RATE: 97 BPM

## 2024-12-04 DIAGNOSIS — I48.91 ATRIAL FIBRILLATION, UNSPECIFIED TYPE: ICD-10-CM

## 2024-12-04 DIAGNOSIS — F32.A ANXIETY AND DEPRESSION: ICD-10-CM

## 2024-12-04 DIAGNOSIS — E03.9 ACQUIRED HYPOTHYROIDISM: ICD-10-CM

## 2024-12-04 DIAGNOSIS — R25.2 MUSCLE CRAMPS: ICD-10-CM

## 2024-12-04 DIAGNOSIS — I50.9 CHRONIC HEART FAILURE, UNSPECIFIED HEART FAILURE TYPE: ICD-10-CM

## 2024-12-04 DIAGNOSIS — Z95.0 PACEMAKER: ICD-10-CM

## 2024-12-04 DIAGNOSIS — F41.9 ANXIETY AND DEPRESSION: ICD-10-CM

## 2024-12-04 DIAGNOSIS — G62.9 NEUROPATHY: ICD-10-CM

## 2024-12-04 DIAGNOSIS — D35.2 PITUITARY MACROADENOMA: ICD-10-CM

## 2024-12-04 DIAGNOSIS — E06.3 HASHIMOTO'S DISEASE: ICD-10-CM

## 2024-12-04 DIAGNOSIS — M79.7 FIBROMYALGIA: Primary | ICD-10-CM

## 2024-12-04 DIAGNOSIS — E11.40 TYPE 2 DIABETES MELLITUS WITH DIABETIC NEUROPATHY, WITHOUT LONG-TERM CURRENT USE OF INSULIN: ICD-10-CM

## 2024-12-04 DIAGNOSIS — E75.21 FABRY'S DISEASE: ICD-10-CM

## 2024-12-04 DIAGNOSIS — Z86.718 HISTORY OF DVT OF LOWER EXTREMITY: ICD-10-CM

## 2024-12-04 LAB
ANION GAP SERPL CALCULATED.3IONS-SCNC: 11.3 MMOL/L (ref 5–15)
BUN SERPL-MCNC: 15 MG/DL (ref 6–20)
BUN/CREAT SERPL: 13 (ref 7–25)
CALCIUM SPEC-SCNC: 9.2 MG/DL (ref 8.6–10.5)
CHLORIDE SERPL-SCNC: 105 MMOL/L (ref 98–107)
CO2 SERPL-SCNC: 21.7 MMOL/L (ref 22–29)
CREAT SERPL-MCNC: 1.15 MG/DL (ref 0.57–1)
EGFRCR SERPLBLD CKD-EPI 2021: 57.4 ML/MIN/1.73
GLUCOSE SERPL-MCNC: 103 MG/DL (ref 65–99)
MAGNESIUM SERPL-MCNC: 2 MG/DL (ref 1.6–2.6)
POTASSIUM SERPL-SCNC: 4.3 MMOL/L (ref 3.5–5.2)
SODIUM SERPL-SCNC: 138 MMOL/L (ref 136–145)
T4 FREE SERPL-MCNC: 0.66 NG/DL (ref 0.92–1.68)
TSH SERPL DL<=0.05 MIU/L-ACNC: 50.9 UIU/ML (ref 0.27–4.2)

## 2024-12-04 PROCEDURE — 84443 ASSAY THYROID STIM HORMONE: CPT

## 2024-12-04 PROCEDURE — 84439 ASSAY OF FREE THYROXINE: CPT

## 2024-12-04 PROCEDURE — 83735 ASSAY OF MAGNESIUM: CPT

## 2024-12-04 PROCEDURE — 80048 BASIC METABOLIC PNL TOTAL CA: CPT

## 2024-12-04 PROCEDURE — 83036 HEMOGLOBIN GLYCOSYLATED A1C: CPT

## 2024-12-04 RX ORDER — SPIRONOLACTONE 50 MG/1
50 TABLET, FILM COATED ORAL DAILY
Qty: 90 TABLET | Refills: 1 | Status: SHIPPED | OUTPATIENT
Start: 2024-12-04

## 2024-12-04 RX ORDER — MAGNESIUM GLUCONATE 27 MG(500)
500 TABLET ORAL 2 TIMES DAILY
COMMUNITY

## 2024-12-04 RX ORDER — PREGABALIN 150 MG/1
CAPSULE ORAL
Qty: 90 CAPSULE | Refills: 0 | Status: SHIPPED | OUTPATIENT
Start: 2024-12-04

## 2024-12-04 NOTE — PATIENT INSTRUCTIONS
Keep appts with specialists when scheduled.  Labs today.  Take medications as ordered.  Low fat, low salt diet.  Exercise as tolerated.  Keep appt with psych.  If you develop thoughts  hurting yourself and a plan , go to ER.

## 2024-12-04 NOTE — PROGRESS NOTES
"    Follow Up Office Visit      Date: 2024   Patient Name: Jazmín Hicks  : 1970   MRN: [id number]     Chief Complaint:    Chief Complaint   Patient presents with    Immobility     Having more issues with mobility. Swelling in legs and abdomen, pain all over.    Hypertension       History of Present Illness: Jazmín Hicks is a 54 y.o. female who presents today for follow-up for hypertension.  Previously seeing BONITA Corona for PCP.      At last visit, patient had reported more trouble falling and balance.  Uses cane more.  Has a tub with shower.    Follows with behavioral.  Reports they increase Zoloft previously.  Seeing Bridgette Vasquez and a counselor [facility].  Was to see cardiology upcoming.      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  History includes DVT, hyperlipidemia, Hashimoto's, CAD, diabetes.  Takes Eliquis due to history of DVT    Had been previously started on Lyrica due to neuropathy.    Had reported had been just monitoring pituitary tumor for now.  Had reported needing neurosurgery locally.    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    Now working for [employer] office as a .     Seeing psych and is prescribed Zoloft.  --------------------------------      Reports \"I need to find another Cardiology outside of [facility]\".  Reports can't get in to see Cardiology  due to her scheduled days off, and no availability for visits.    Using cane.    Reports having a lot of muscle spasms in legs, neck and back.    Taking Lyrica.    Today, patient reports having more trouble with mobility.  Reports swelling in legs and abdomen.  Using a cane.    Reports pain all over.      Patient last seen by me on 2024.  Per chart review, patient has missed appointment on 2024, and 2024  Patient was seen in ER at [facility] " on    11/5/2024.  Was seen for nonspecific chest pain.  Was to follow-up with cardiology  From chart review, there is no evidence of ACS, myocarditis, or pericarditis.  No acute cardiopulmonary disease noted on chest x-ray.  Patient was post to follow-up with cardiology    Creatinine at [facility] was 1.02.  BUN was 14.      Following with Psych and has an appt on 1211/24.    Reports she has dreamed about suicide at times.  Is frustrated due to lack of mobility and pain.    Zoloft increased recently.    Reports has no plan to injure herself.  Does have dreams at times, but does not plan to injure herself.                Subjective      Review of Systems:   Review of Systems   Constitutional:  Positive for chills and fatigue. Negative for diaphoresis and fever.   HENT:  Negative for congestion and sore throat.    Respiratory:  Negative for cough.    Cardiovascular:  Negative for chest pain.   Gastrointestinal:  Positive for nausea. Negative for vomiting.   Genitourinary:  Negative for dysuria.   Musculoskeletal:  Positive for myalgias and neck pain.        Reports chronic musculoskeletal pain all over.   Skin:  Negative for rash.   Neurological:  Positive for weakness.   Psychiatric/Behavioral:  Negative for suicidal ideas.        I have reviewed the patients family history, social history, past medical history, past surgical history and have updated it as appropriate.     Medications:     Current Outpatient Medications:     albuterol sulfate  (90 Base) MCG/ACT inhaler, Inhale 2 puffs Every 4 (Four) Hours As Needed for Shortness of Air., Disp: 18 g, Rfl: 0    amLODIPine (NORVASC) 10 MG tablet, Take 1 tablet by mouth Daily., Disp: , Rfl:     aspirin 325 MG EC tablet, Take 1 tablet by mouth Every 6 (Six) Hours As Needed for Mild Pain., Disp: , Rfl:     cetirizine (zyrTEC) 10 MG tablet, Take 1 tablet by mouth Daily., Disp: , Rfl:     colestipol (COLESTID) 1 g tablet, Take 2 tablets by mouth 2 (Two) Times a Day.  Titrate to effectiveness. Do not take within 2 hours of other medications, Disp: 120 tablet, Rfl: 5    Eliquis 5 MG tablet tablet, TAKE ONE TABLET BY MOUTH EVERY 12 HOURS, Disp: 60 tablet, Rfl: 5    furosemide (LASIX) 20 MG tablet, Take 1 tablet by mouth Daily., Disp: 90 tablet, Rfl: 1    gemfibrozil (LOPID) 600 MG tablet, TAKE 1 TABLET BY MOUTH DAILY, Disp: 30 tablet, Rfl: 0    hydroCHLOROthiazide 25 MG tablet, , Disp: , Rfl:     levothyroxine (SYNTHROID, LEVOTHROID) 175 MCG tablet, TAKE 1 TABLET BY MOUTH DAILY, Disp: 90 tablet, Rfl: 0    losartan (COZAAR) 100 MG tablet, TAKE 1 TABLET BY MOUTH DAILY, Disp: 30 tablet, Rfl: 0    magnesium gluconate (MAGONATE) 500 MG tablet, Take 1 tablet by mouth 2 (Two) Times a Day., Disp: , Rfl:     meloxicam (MOBIC) 7.5 MG tablet, TAKE 1 TABLET BY MOUTH DAILY, Disp: 90 tablet, Rfl: 0    metFORMIN (GLUCOPHAGE) 500 MG tablet, TAKE 1 TABLET BY MOUTH TWICE A DAY WITH A MEAL, Disp: 180 tablet, Rfl: 1    metoprolol tartrate (LOPRESSOR) 25 MG tablet, TAKE 1 TABLET BY MOUTH 2 TIMES A DAY, Disp: 60 tablet, Rfl: 0    multivitamin with minerals (MULTIVITAMIN ADULT PO), Take 1 tablet by mouth Daily. Vitafusion, Disp: , Rfl:     NIFEdipine CC (ADALAT CC) 60 MG 24 hr tablet, TAKE 1 TABLET BY MOUTH DAILY, Disp: 90 tablet, Rfl: 0    nitroglycerin (NITROLINGUAL) 0.4 MG/SPRAY spray, Place 1 spray under the tongue Every 5 (Five) Minutes As Needed for Chest Pain. Repeat up to three times, then go to ER., Disp: 12 g, Rfl: 1    omeprazole (priLOSEC) 20 MG capsule, Take 1 capsule by mouth Daily., Disp: , Rfl:     pregabalin (LYRICA) 150 MG capsule, 150 mg in am and 300 mg in evening., Disp: 90 capsule, Rfl: 0    rosuvastatin (CRESTOR) 5 MG tablet, TAKE 1 TABLET BY MOUTH DAILY, Disp: 90 tablet, Rfl: 0    sertraline (ZOLOFT) 100 MG tablet, TAKE 1 TABLET BY MOUTH DAILY, Disp: 30 tablet, Rfl: 0    spironolactone (ALDACTONE) 50 MG tablet, Take 1 tablet by mouth Daily., Disp: 90 tablet, Rfl: 1    SUMAtriptan  "(IMITREX) 100 MG tablet, Take 1 tablet by mouth 2 (Two) Times a Day As Needed for Migraine. Take one tablet at onset of headache. May repeat dose one time in 2 hours if headache not relieved., Disp: , Rfl:     traZODone (DESYREL) 50 MG tablet, TAKE ONE TABLET BY MOUTH ONCE NIGHTLY, Disp: 30 tablet, Rfl: 2    Allergies:   Allergies   Allergen Reactions    Capsaicin Shortness Of Breath    Adhesive Tape Other (See Comments)     blisters    Cat Hair Extract Unknown - High Severity    Wound Dressings Unknown - Low Severity    Darvon [Propoxyphene] GI Intolerance    Lisinopril Nausea Only       Objective     Physical Exam: Please see above  Vital Signs:   Vitals:    12/04/24 1158   BP: 140/88   Pulse: 97   Temp: 97.1 °F (36.2 °C)   TempSrc: Infrared   SpO2: 94%   Weight: (!) 179 kg (394 lb)   Height: 167.6 cm (65.98\")   PainSc:   7     Body mass index is 63.62 kg/m².          Physical Exam  Vitals and nursing note reviewed.   Constitutional:       Appearance: Normal appearance.   HENT:      Head: Normocephalic and atraumatic.   Neck:      Vascular: No carotid bruit.   Cardiovascular:      Rate and Rhythm: Normal rate and regular rhythm.      Heart sounds: Normal heart sounds. No murmur heard.  Pulmonary:      Effort: Pulmonary effort is normal.      Breath sounds: Normal breath sounds.   Abdominal:      General: Bowel sounds are normal.      Palpations: Abdomen is soft. There is no mass.      Tenderness: There is no abdominal tenderness.      Comments: Obese     Musculoskeletal:      Right lower leg: No edema.      Left lower leg: No edema.      Comments: Muscle strength bilateral left 4/5   Skin:     Coloration: Skin is not jaundiced or pale.      Findings: No erythema.   Neurological:      Mental Status: She is alert. Mental status is at baseline.   Psychiatric:         Mood and Affect: Mood normal.         Behavior: Behavior normal.         Procedures    Results:   Labs:   Hemoglobin A1C   Date Value Ref Range Status "   12/04/2024 6.60 (H) 4.80 - 5.60 % Final     TSH   Date Value Ref Range Status   12/04/2024 50.900 (H) 0.270 - 4.200 uIU/mL Final   11/05/2024 31.8 (H) 0.358 - 3.740 uIU/mL Final        POCT Results (if applicable):   Results for orders placed or performed in visit on 07/31/24   Lipid Panel    Collection Time: 07/31/24 12:35 PM    Specimen: Blood   Result Value Ref Range    Total Cholesterol 161 0 - 200 mg/dL    Triglycerides 185 (H) 0 - 150 mg/dL    HDL Cholesterol 29 (L) 40 - 60 mg/dL    LDL Cholesterol  100 0 - 100 mg/dL    VLDL Cholesterol 32 5 - 40 mg/dL    LDL/HDL Ratio 3.28    Hemoglobin A1c    Collection Time: 07/31/24 12:35 PM    Specimen: Blood   Result Value Ref Range    Hemoglobin A1C 6.40 (H) 4.80 - 5.60 %       PHQ-9: PHQ-9 Total Score:       Imaging:   No valid procedures specified.     Measures:   Advanced Care Planning:   Patient does not have an advance directive, declines further assistance.    Smoking Cessation:   Does not smoke    Assessment / Plan      Assessment/Plan:     Otilio reviewed.  Drug screen done 4/5/24  CSA on file.    Reports no plan to injure herself.    Has upcoming appt with psych and reports she will keep the appt.      1. Fibromyalgia  Patient to continue Lyrica.  Patient already taking 150 mg capsule in the morning, and 300 mg in the evening.  Encourage patient to get physical activity with as tolerated, and encouraged patient to get 6 to 7 hours of sleep at night.    - pregabalin (LYRICA) 150 MG capsule; 150 mg in am and 300 mg in evening.  Dispense: 90 capsule; Refill: 0    2. Muscle cramps  We will check BMP, magnesium.  - Magnesium; Future  - Basic Metabolic Panel; Future    3. Neuropathy  Referral to neurology.  Patient to continue Lyrica.  - Ambulatory Referral to Neurology  - pregabalin (LYRICA) 150 MG capsule; 150 mg in am and 300 mg in evening.  Dispense: 90 capsule; Refill: 0    4. History of DVT of lower extremity  Patient to continue Eliquis.    5. Chronic heart  failure, unspecified heart failure type  Referral to cardiology as requested.  Request outside cardiology referral [facility]  Patient to continue spironolactone.  Continue blood pressure medications as currently taking.  - Ambulatory Referral to Cardiology  - spironolactone (ALDACTONE) 50 MG tablet; Take 1 tablet by mouth Daily.  Dispense: 90 tablet; Refill: 1    6. Atrial fibrillation, unspecified type  Referral to cardiology as above.  Continue Eliquis.  - Ambulatory Referral to Cardiology    7. Pacemaker  Referral to cardiology as above  - Ambulatory Referral to Cardiology    8. Fabry's disease  Referral to neurology.    - Ambulatory Referral to Neurology    9. Type 2 diabetes mellitus with diabetic neuropathy, without long-term current use of insulin  Check A1c.  - Hemoglobin A1c; Future    10. Hashimoto's disease  Referral to endocrinology.    Check TSH, free T4  Continue Synthroid at current dosing for now.  - Ambulatory Referral to Endocrinology  - TSH; Future  - T4, Free; Future    11. Pituitary macroadenoma  Referral to endocrinology.  - Ambulatory Referral to Endocrinology    12. Acquired hypothyroidism  As above.  - Ambulatory Referral to Endocrinology  - TSH; Future  - T4, Free; Future    13. Anxiety and depression  Patient to continue to follow with psychiatry.  Continue current prescribed medications.  Discussed with patient if she has any thoughts of suicide, and the plan to injure herself, she needs to go to ER for evaluation.  Patient expressed understanding and agrees to do so.  Reports she has no plan to injure herself        Part of this note may be an electronic transcription/translation of spoken language to printed text using the Dragon Dictation System.    72 minutes were spent on this patient encounter which included history taking, physical exam, answering patient questions, counseling, discussing treatment plan, placing orders, and documentation.        Vaccine Counseling:      Follow  Up:   Return in about 3 months (around 3/4/2025).      DO TANA Herr

## 2024-12-05 LAB — HBA1C MFR BLD: 6.6 % (ref 4.8–5.6)

## 2024-12-09 RX ORDER — MAGNESIUM GLUCONATE 27 MG(500)
500 TABLET ORAL 2 TIMES DAILY
Status: CANCELLED | OUTPATIENT
Start: 2024-12-09

## 2024-12-10 ENCOUNTER — TELEPHONE (OUTPATIENT)
Dept: FAMILY MEDICINE CLINIC | Facility: CLINIC | Age: 54
End: 2024-12-10
Payer: COMMERCIAL

## 2024-12-10 DIAGNOSIS — E03.9 ACQUIRED HYPOTHYROIDISM: ICD-10-CM

## 2024-12-10 DIAGNOSIS — E06.3 HASHIMOTO'S DISEASE: ICD-10-CM

## 2024-12-10 NOTE — TELEPHONE ENCOUNTER
Hub to Relay    Called patient and left a message for patient to return our call. Also sent message through her my chart account.    Let patient know recent labs were reviewed and A1c was 6.60 which is in the controlled range for diabetes.  TSH was elevated at 50.900, and free T4 was low at 0.66.  Let patient know from medication orders in the chart, it appears that she has not been taking her Synthroid every day.  Please verify this with the patient and see if she has been missing doses.  Let me know.  Chemistry panel was reviewed and glucose was 103 which is slightly elevated.  Creatinine was 1.15 which is elevated some.  This is improved from 4 months ago when it was 1.34.  Remind patient to drink enough water.  Magnesium was normal at 2.0.  Potassium was normal at 4.3.

## 2024-12-10 NOTE — TELEPHONE ENCOUNTER
Call patient.  Per chart review, I have not been prescribing patient magnesium gluconate.  This is a reported medication.    Patient had a magnesium level done on 12/4/2024 that was in the normal range at 2.0.  Normal ranges 1.6-2.6.  According to this, patient does not need to take magnesium  I am not sure who or if anyone has been prescribing this?

## 2024-12-11 RX ORDER — LEVOTHYROXINE SODIUM 200 UG/1
200 TABLET ORAL DAILY
Qty: 90 TABLET | Refills: 0 | Status: SHIPPED | OUTPATIENT
Start: 2024-12-11

## 2024-12-11 NOTE — TELEPHONE ENCOUNTER
Let patient know I have sent in a higher dose of Synthroid.  Patient to take 200 mcg daily instead of the 175 mcg.

## 2024-12-11 NOTE — TELEPHONE ENCOUNTER
HUB TO RELAY    LVM. Per Dr. Fitch: Per chart review, I have not been prescribing patient magnesium gluconate.  This is a reported medication.     Patient had a magnesium level done on 12/4/2024 that was in the normal range at 2.0.  Normal ranges 1.6-2.6.  According to this, patient does not need to take magnesium  I am not sure who or if anyone has been prescribing this?

## 2024-12-12 NOTE — TELEPHONE ENCOUNTER
HUB TO RELAY    LVM. Per Dr. Fitch:   Let patient know I have sent in a higher dose of Synthroid.  Patient to take 200 mcg daily instead of the 175 mcg.

## 2024-12-21 DIAGNOSIS — E78.1 HYPERTRIGLYCERIDEMIA: ICD-10-CM

## 2024-12-21 DIAGNOSIS — I10 ESSENTIAL HYPERTENSION: ICD-10-CM

## 2024-12-23 RX ORDER — LOSARTAN POTASSIUM 100 MG/1
100 TABLET ORAL DAILY
Qty: 30 TABLET | Refills: 2 | Status: SHIPPED | OUTPATIENT
Start: 2024-12-23

## 2024-12-23 RX ORDER — METOPROLOL TARTRATE 25 MG/1
25 TABLET, FILM COATED ORAL 2 TIMES DAILY
Qty: 60 TABLET | Refills: 2 | Status: SHIPPED | OUTPATIENT
Start: 2024-12-23

## 2024-12-23 RX ORDER — GEMFIBROZIL 600 MG/1
600 TABLET, FILM COATED ORAL DAILY
Qty: 30 TABLET | Refills: 2 | Status: SHIPPED | OUTPATIENT
Start: 2024-12-23

## 2025-01-01 DIAGNOSIS — I10 ESSENTIAL HYPERTENSION: ICD-10-CM

## 2025-01-02 RX ORDER — LOSARTAN POTASSIUM 100 MG/1
100 TABLET ORAL DAILY
Qty: 30 TABLET | Refills: 2 | Status: SHIPPED | OUTPATIENT
Start: 2025-01-02

## 2025-01-02 RX ORDER — AMLODIPINE BESYLATE 10 MG/1
10 TABLET ORAL DAILY
Qty: 30 TABLET | Refills: 1 | Status: SHIPPED | OUTPATIENT
Start: 2025-01-02

## 2025-01-02 RX ORDER — ASPIRIN 325 MG
325 TABLET, DELAYED RELEASE (ENTERIC COATED) ORAL EVERY 6 HOURS PRN
Qty: 30 TABLET | Refills: 1 | Status: SHIPPED | OUTPATIENT
Start: 2025-01-02

## 2025-01-15 ENCOUNTER — TELEMEDICINE (OUTPATIENT)
Dept: PSYCHIATRY | Facility: CLINIC | Age: 55
End: 2025-01-15
Payer: COMMERCIAL

## 2025-01-15 DIAGNOSIS — F33.1 MAJOR DEPRESSIVE DISORDER, RECURRENT EPISODE, MODERATE: Primary | Chronic | ICD-10-CM

## 2025-01-15 DIAGNOSIS — F41.9 ANXIETY DISORDER, UNSPECIFIED TYPE: Chronic | ICD-10-CM

## 2025-01-15 DIAGNOSIS — F43.10 POST TRAUMATIC STRESS DISORDER (PTSD): ICD-10-CM

## 2025-01-15 DIAGNOSIS — G47.9 SLEEPING DIFFICULTIES: ICD-10-CM

## 2025-01-15 NOTE — PROGRESS NOTES
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 Status    QT Interval 04/29/2024 378  ms Final    QTC Interval 04/29/2024 457  ms Final    QT Interval 04/29/2024 406  ms Final    QTC Interval 04/29/2024 471  ms Final    HS Troponin T 04/29/2024 8  <14 ng/L Final    Glucose 04/29/2024 97  65 - 99 mg/dL Final    BUN 04/29/2024 12  6 - 20 mg/dL Final    Creatinine 04/29/2024 0.84  0.57 - 1.00 mg/dL Final    Sodium 04/29/2024 142  136 - 145 mmol/L Final    Potassium 04/29/2024 3.5  3.5 - 5.2 mmol/L Final    Chloride 04/29/2024 105  98 - 107 mmol/L Final    CO2 04/29/2024 26.0  22.0 - 29.0 mmol/L Final    Calcium 04/29/2024 9.4  8.6 - 10.5 mg/dL Final    Total Protein 04/29/2024 7.6  6.0 - 8.5 g/dL Final    Albumin 04/29/2024 4.1  3.5 - 5.2 g/dL Final    ALT (SGPT) 04/29/2024 24  1 - 33 U/L Final    AST (SGOT) 04/29/2024 23  1 - 32 U/L Final    Alkaline Phosphatase 04/29/2024 105  39 - 117 U/L Final    Total Bilirubin 04/29/2024 0.3  0.0 - 1.2 mg/dL Final    Globulin 04/29/2024 3.5  gm/dL Final    Calculated Result    A/G Ratio 04/29/2024 1.2  g/dL Final    BUN/Creatinine Ratio 04/29/2024 14.3  7.0 - 25.0 Final    Anion Gap 04/29/2024 11.0  5.0 - 15.0 mmol/L Final    eGFR 04/29/2024 83.7  >60.0 mL/min/1.73 Final    Lipase 04/29/2024 24  13 - 60  U/L Final    proBNP 04/29/2024 358.4  0.0 - 900.0 pg/mL Final    Extra Tube 04/29/2024 Hold for add-ons.   Final    Auto resulted.    Extra Tube 04/29/2024 hold for add-on   Final    Auto resulted    Extra Tube 04/29/2024 Hold for add-ons.   Final    Auto resulted.    Extra Tube 04/29/2024 Hold for add-ons.   Final    Auto resulted.    Extra Tube 04/29/2024 Hold for add-ons.   Final    Auto resulted    WBC 04/29/2024 7.61  3.40 - 10.80 10*3/mm3 Final    RBC 04/29/2024 4.73  3.77 - 5.28 10*6/mm3 Final    Hemoglobin 04/29/2024 12.6  12.0 - 15.9 g/dL Final    Hematocrit 04/29/2024 39.6  34.0 - 46.6 % Final    MCV 04/29/2024 83.7  79.0 - 97.0 fL Final    MCH 04/29/2024 26.6  26.6 - 33.0 pg Final    MCHC 04/29/2024 31.8  31.5 - 35.7 g/dL Final    RDW 04/29/2024 14.1  12.3 - 15.4 % Final    RDW-SD 04/29/2024 43.1  37.0 - 54.0 fl Final    MPV 04/29/2024 10.4  6.0 - 12.0 fL Final    Platelets 04/29/2024 309  140 - 450 10*3/mm3 Final    Neutrophil % 04/29/2024 60.4  42.7 - 76.0 % Final    Lymphocyte % 04/29/2024 32.1  19.6 - 45.3 % Final    Monocyte % 04/29/2024 4.9 (L)  5.0 - 12.0 % Final    Eosinophil % 04/29/2024 1.4  0.3 - 6.2 % Final    Basophil % 04/29/2024 0.7  0.0 - 1.5 % Final    Immature Grans % 04/29/2024 0.5  0.0 - 0.5 % Final    Neutrophils, Absolute 04/29/2024 4.60  1.70 - 7.00 10*3/mm3 Final    Lymphocytes, Absolute 04/29/2024 2.44  0.70 - 3.10 10*3/mm3 Final    Monocytes, Absolute 04/29/2024 0.37  0.10 - 0.90 10*3/mm3 Final    Eosinophils, Absolute 04/29/2024 0.11  0.00 - 0.40 10*3/mm3 Final    Basophils, Absolute 04/29/2024 0.05  0.00 - 0.20 10*3/mm3 Final    Immature Grans, Absolute 04/29/2024 0.04  0.00 - 0.05 10*3/mm3 Final    nRBC 04/29/2024 0.0  0.0 - 0.2 /100 WBC Final    HS Troponin T 04/29/2024 <6  <14 ng/L Final    Troponin T Numeric Delta 04/29/2024    Final    Unable to calculate.   Office Visit on 04/05/2024   Component Date Value Ref Range Status    Hemoglobin A1C 04/05/2024 5.8 (A)  4.5 - 5.7 %  Final    Lot Number 04/05/2024 [id number]   Final    Expiration Date 04/05/2024 12/17/2025   Final    Microalbumin, Urine 04/05/2024 10   Final    Creatinine, Urine 04/05/2024 50   Final    Lot Number 04/05/2024 [id number]   Final    Expiration Date 04/05/2024 05/09/2025   Final    TSH 04/05/2024 2.370  0.270 - 4.200 uIU/mL Final    Free T4 04/05/2024 1.37  0.93 - 1.70 ng/dL Final    Report Summary 04/05/2024 FINAL   Final    Comment: ====================================================================  [facility]  ====================================================================  Test                             Result       Flag       Units  Drug Present    Acetaminophen                  PRESENT    Ibuprofen                      PRESENT    Diphenhydramine                PRESENT  ====================================================================  Test                      Result    Flag   Units      Ref Range    Creatinine              89               mg/dL      >=20  ====================================================================  Declared Medications:   Medication list was not provided.  ====================================================================  For clinical consultation, please call [phone].  ====================================================================             Assessment & Plan   Problems Addressed this Visit    None  Visit Diagnoses       Major depressive disorder, recurrent episode, moderate  (Chronic)   -  Primary    Relevant Medications    Vortioxetine HBr (Trintellix) 5 MG tablet tablet    Anxiety disorder, unspecified type  (Chronic)       Relevant Medications    Vortioxetine HBr (Trintellix) 5 MG tablet tablet    Post traumatic stress disorder (PTSD)        Relevant Medications    Vortioxetine HBr (Trintellix) 5 MG tablet tablet    Sleeping difficulties              Diagnoses         Codes Comments    Major depressive disorder, recurrent  episode, moderate    -  Primary ICD-10-CM: F33.1  ICD-9-CM: 296.32     Anxiety disorder, unspecified type     ICD-10-CM: F41.9  ICD-9-CM: 300.00     Post traumatic stress disorder (PTSD)     ICD-10-CM: F43.10  ICD-9-CM: 309.81     Sleeping difficulties     ICD-10-CM: G47.9  ICD-9-CM: 780.50             Visit Diagnoses:    ICD-10-CM ICD-9-CM   1. Major depressive disorder, recurrent episode, moderate  F33.1 296.32   2. Anxiety disorder, unspecified type  F41.9 300.00   3. Post traumatic stress disorder (PTSD)  F43.10 309.81   4. Sleeping difficulties  G47.9 780.50           GOALS:  Short Term Goals: Patient will be compliant with medication, and patient will have no significant medication related side effects.  Patient will be engaged in psychotherapy as indicated.  Patient will report subjective improvement of symptoms.  Long term goals: To stabilize mood and treat/improve subjective symptoms, the patient will stay out of the hospital, the patient will be at an optimal level of functioning, and the patient will take all medications as prescribed.  The patient verbalized understanding and agreement with goals that were mutually set.      TREATMENT PLAN: Re-start supportive psychotherapy efforts and take medications as indicated.  Medication and treatment options, both pharmacological and non-pharmacological treatment options, discussed during today's visit, including any off label use of medication. Patient acknowledged and verbally consented with current treatment plan and was educated on the importance of compliance with treatment and follow-up appointments.      -Sertraline discontinued as the patient has already stopped taking this medication.  -Begin Trintellix 5 mg by mouth once daily in the morning with food for mood.    Visit Time (face to face direct patient care):  60 minutes of face to face direct patient care with the patient spent in further diagnosis and evaluation, coordination of care, and counseling  the patient regarding diagnoses and treatment planning. Answered any questions patient had with medication and treatment plan.          Total Time spent by this APRN for today's encounter:  75 total minutes were spent by this APRN on charting/documentation, review of past available medical records, direct face to face patient care, coordination of care, and prescribing of medication.       MEDICATION ISSUES:  Discussed medication options and treatment plan of prescribed medication, any off label use of medication, as well as the risks, benefits, any black box warnings including increased suicidality, and side effects including but not limited to potential falls, dizziness, possible impaired driving, GI side effects (change in appetite, abdominal discomfort, nausea, vomiting, diarrhea, and/or constipation), dry mouth, somnolence, sedation, insomnia, activation, agitation, irritation, tremors, abnormal muscle movements or disorders, headache, sweating, possible bruising or rare bleeding, electrolyte and/or fluid abnormalities, change in blood pressure/heart rate/and or heart rhythm, sexual dysfunction, and metabolic adversities among others. Patient and/or guardian agreeable to call the office with any worsening of symptoms or onset of side effects, or if any concerns or questions arise.  The contact information for the office is made available to the patient and/or guardian.  Patient and/or guardian agreeable to call 911 or go to the nearest ER should they begin having any SI/HI, or if any urgent concerns arise.    Due to the nature of virtual visits and inability to monitor vital signs and weight with virtual visits, the patient has been encouraged to monitor their vital signs and weight regularly either through self-monitoring via home device(s) or with their Primary Care Provider, and the patient has been instructed to notify this APRN of any abnormalities or changes from baseline.      VERBAL INFORMED CONSENT FOR  MEDICATION:  The patient was educated that their proposed/prescribed psychotropic medication(s) has potential risks, side effects, adverse effects, and black box warnings; and these have been discussed with the patient.  The patient has been informed that their treatment and medication dosage is to be individualized, and may even be above or below the recommended range/dosage due to patient individualization and response, but medication is prescribed using a shared decision making approach, and no medication or dosage will be prescribed without the patient's verbal consent.  The reason for the use of the medication including any off label use and alternative modes of treatment other than or in addition to medication has been considered and discussed, the probable consequences of not receiving the proposed treatment have been discussed, and any treatment side effects, black box warnings, and cautions associated with treatment have been discussed with the patient.  The patient is allowed ample time to openly discuss and ask questions regarding the proposed medication(s) and treatment plan and the patient verbalizes understanding the reasons for the use of the medication, its potential risks and benefits, other alternative treatment(s), and the probable consequences that may occur if the proposed medication is not given.  The patient has been given ample time to ask questions and study the information and find the information to be specific, accurate, and complete.  The patient gives verbal consent for the medication(s) proposed/prescribed, they verbalized understanding that they can refuse and withdraw consent at any time with the assistance of this APRN, and the patient has verbally confirmed that they are aware, and are willing, to take the prescribed medication and follow the treatment plan with the known possible risks, side effect, black box warnings, and any potential medication interactions, and the patient  reports they will be worse off without this medication and treatment plan.  The patient is advised to contact this APRN/this office if any questions or concerns arise at any time (at [phone]), or call 911/go to the closest emergency department if needed or outside of office hours.      SUICIDE RISK ASSESSMENT AND SAFETY PLAN: Unalterable demographics and a history of mental health intervention indicate this patient is in a high risk category compared to the general population. At present, the patient denies active SI/HI, intentions, or plans at this time and agrees to seek immediate care should such thoughts develop. The patient verbalizes understanding of how to access emergency care if needed and agrees to do so. Consideration of suicide risk and protective factors such as history, current presentation, individual strengths and weaknesses, psychosocial and environmental stressors and variables, psychiatric illness and symptoms, medical conditions and pain, took place in this interview. Based on those considerations, the patient is determined: within individual baseline and presenting no imminent risk for suicide or homicide. Other recommendations: The patient does not meet the criteria for inpatient admission and is not a safety risk to self or others at today's visit. Inpatient treatment offers no significant advantages over outpatient treatment for this patient at today's visit.  The patient was given ample time for questions and fully participated in treatment planning.  The patient was encouraged to call the clinic with any questions or concerns.  The patient was informed of access to emergency care. If patient were to develop any significant symptomatology, suicidal ideation, homicidal ideation, any concerns, or feel unsafe at any time they are to call the clinic and if unable to get immediate assistance should immediately call 911 or go to the nearest emergency room.  Patient contracted verbally for the  following: If you are experiencing an emotional crisis or have thoughts of harming yourself or others, please go to your nearest local emergency room or call 911. Will continue to re-assess medication response and side effects frequently to establish efficacy and ensure safety. Risks, any black box warnings, side effects, off label usage, and benefits of medication and treatment discussed with patient, along with potential adverse side effects of current and/or newly prescribed medication, alternative treatment options, and OTC medications.  Patient verbalized understanding of potential risks, any off label use of medication, any black box warnings, and any side effects in their own words. The patient verbalized understanding and agreed to comply with the safety plan discussed in their own words.  Patient given the number to the office. Number also discussed of the 24- hour suicide hotline.           MEDS ORDERED DURING VISIT:  New Medications Ordered This Visit   Medications    Vortioxetine HBr (Trintellix) 5 MG tablet tablet     Sig: Take 1 tablet by mouth Daily With Breakfast.     Dispense:  30 tablet     Refill:  0       Return in about 4 weeks (around 2/12/2025), or if symptoms worsen or fail to improve, for Next scheduled follow up and Recheck.         Progress towards goal: Not at goal    Functional Status: Moderate impairment     Prognosis: Good with Ongoing Treatment             This document has been electronically signed by BONITA Raza  January 15, 2025 12:35 EST    Some of the data in this electronic note has been brought forward from a previous encounter, any necessary changes have been made, it has been reviewed by this APRN, and it is accurate.    Please note that portions of this note were completed with a voice recognition program.

## 2025-01-31 ENCOUNTER — APPOINTMENT (OUTPATIENT)
Dept: CT IMAGING | Facility: HOSPITAL | Age: 55
End: 2025-01-31
Payer: COMMERCIAL

## 2025-01-31 ENCOUNTER — OFFICE VISIT (OUTPATIENT)
Dept: FAMILY MEDICINE CLINIC | Facility: CLINIC | Age: 55
End: 2025-01-31
Payer: MEDICAID

## 2025-01-31 ENCOUNTER — HOSPITAL ENCOUNTER (EMERGENCY)
Facility: HOSPITAL | Age: 55
Discharge: HOME | End: 2025-01-31
Attending: EMERGENCY MEDICINE
Payer: COMMERCIAL

## 2025-01-31 VITALS
WEIGHT: 293 LBS | HEART RATE: 72 BPM | BODY MASS INDEX: 47.09 KG/M2 | RESPIRATION RATE: 18 BRPM | TEMPERATURE: 98.3 F | HEIGHT: 66 IN | DIASTOLIC BLOOD PRESSURE: 92 MMHG | OXYGEN SATURATION: 97 % | SYSTOLIC BLOOD PRESSURE: 157 MMHG

## 2025-01-31 VITALS
OXYGEN SATURATION: 97 % | WEIGHT: 293 LBS | DIASTOLIC BLOOD PRESSURE: 90 MMHG | HEART RATE: 96 BPM | BODY MASS INDEX: 63.14 KG/M2 | TEMPERATURE: 98 F | SYSTOLIC BLOOD PRESSURE: 120 MMHG

## 2025-01-31 DIAGNOSIS — D35.2 PITUITARY MACROADENOMA: ICD-10-CM

## 2025-01-31 DIAGNOSIS — E06.3 HASHIMOTO'S DISEASE: ICD-10-CM

## 2025-01-31 DIAGNOSIS — I48.91 ATRIAL FIBRILLATION, UNSPECIFIED TYPE: ICD-10-CM

## 2025-01-31 DIAGNOSIS — M79.7 FIBROMYALGIA: ICD-10-CM

## 2025-01-31 DIAGNOSIS — E75.21 FABRY'S DISEASE: ICD-10-CM

## 2025-01-31 DIAGNOSIS — R20.8 DECREASED SENSATION: Primary | ICD-10-CM

## 2025-01-31 DIAGNOSIS — Z95.0 PACEMAKER: ICD-10-CM

## 2025-01-31 DIAGNOSIS — G47.33 OSA (OBSTRUCTIVE SLEEP APNEA): ICD-10-CM

## 2025-01-31 DIAGNOSIS — Z86.718 HISTORY OF DVT OF LOWER EXTREMITY: ICD-10-CM

## 2025-01-31 DIAGNOSIS — G43.819 OTHER MIGRAINE WITHOUT STATUS MIGRAINOSUS, INTRACTABLE: Primary | ICD-10-CM

## 2025-01-31 DIAGNOSIS — H53.9 VISUAL CHANGES: ICD-10-CM

## 2025-01-31 DIAGNOSIS — I50.9 CHRONIC HEART FAILURE, UNSPECIFIED HEART FAILURE TYPE: ICD-10-CM

## 2025-01-31 DIAGNOSIS — G43.109 MIGRAINE WITH AURA AND WITHOUT STATUS MIGRAINOSUS, NOT INTRACTABLE: ICD-10-CM

## 2025-01-31 DIAGNOSIS — I10 ESSENTIAL HYPERTENSION: ICD-10-CM

## 2025-01-31 DIAGNOSIS — E03.9 ACQUIRED HYPOTHYROIDISM: ICD-10-CM

## 2025-01-31 DIAGNOSIS — E06.3 HYPOTHYROIDISM DUE TO HASHIMOTO THYROIDITIS: ICD-10-CM

## 2025-01-31 LAB
ANION GAP SERPL CALCULATED.3IONS-SCNC: 13 MMOL/L (ref 5–15)
BASOPHILS # BLD AUTO: 0.06 10*3/MM3 (ref 0–0.2)
BASOPHILS NFR BLD AUTO: 0.9 % (ref 0–1.5)
BUN SERPL-MCNC: 11 MG/DL (ref 6–20)
BUN/CREAT SERPL: 13.1 (ref 7–25)
CALCIUM SPEC-SCNC: 9.3 MG/DL (ref 8.6–10.5)
CHLORIDE SERPL-SCNC: 102 MMOL/L (ref 98–107)
CO2 SERPL-SCNC: 24 MMOL/L (ref 22–29)
CREAT SERPL-MCNC: 0.84 MG/DL (ref 0.57–1)
DEPRECATED RDW RBC AUTO: 52.6 FL (ref 37–54)
EGFRCR SERPLBLD CKD-EPI 2021: 83.7 ML/MIN/1.73
EOSINOPHIL # BLD AUTO: 0.08 10*3/MM3 (ref 0–0.4)
EOSINOPHIL NFR BLD AUTO: 1.2 % (ref 0.3–6.2)
ERYTHROCYTE [DISTWIDTH] IN BLOOD BY AUTOMATED COUNT: 17.2 % (ref 12.3–15.4)
GLUCOSE SERPL-MCNC: 109 MG/DL (ref 65–99)
HCT VFR BLD AUTO: 42.5 % (ref 34–46.6)
HGB BLD-MCNC: 13 G/DL (ref 12–15.9)
IMM GRANULOCYTES # BLD AUTO: 0.03 10*3/MM3 (ref 0–0.05)
IMM GRANULOCYTES NFR BLD AUTO: 0.5 % (ref 0–0.5)
LYMPHOCYTES # BLD AUTO: 2.79 10*3/MM3 (ref 0.7–3.1)
LYMPHOCYTES NFR BLD AUTO: 42.6 % (ref 19.6–45.3)
MAGNESIUM SERPL-MCNC: 2.2 MG/DL (ref 1.6–2.6)
MCH RBC QN AUTO: 25.8 PG (ref 26.6–33)
MCHC RBC AUTO-ENTMCNC: 30.6 G/DL (ref 31.5–35.7)
MCV RBC AUTO: 84.5 FL (ref 79–97)
MONOCYTES # BLD AUTO: 0.24 10*3/MM3 (ref 0.1–0.9)
MONOCYTES NFR BLD AUTO: 3.7 % (ref 5–12)
NEUTROPHILS NFR BLD AUTO: 3.35 10*3/MM3 (ref 1.7–7)
NEUTROPHILS NFR BLD AUTO: 51.1 % (ref 42.7–76)
NRBC BLD AUTO-RTO: 0 /100 WBC (ref 0–0.2)
PLATELET # BLD AUTO: 270 10*3/MM3 (ref 140–450)
PMV BLD AUTO: 10.9 FL (ref 6–12)
POTASSIUM SERPL-SCNC: 3.8 MMOL/L (ref 3.5–5.2)
QT INTERVAL: 436 MS
QTC INTERVAL: 497 MS
RBC # BLD AUTO: 5.03 10*6/MM3 (ref 3.77–5.28)
SODIUM SERPL-SCNC: 139 MMOL/L (ref 136–145)
T4 FREE SERPL-MCNC: 0.59 NG/DL (ref 0.92–1.68)
TSH SERPL DL<=0.05 MIU/L-ACNC: 51.69 UIU/ML (ref 0.27–4.2)
WBC NRBC COR # BLD AUTO: 6.55 10*3/MM3 (ref 3.4–10.8)

## 2025-01-31 PROCEDURE — 93005 ELECTROCARDIOGRAM TRACING: CPT | Performed by: EMERGENCY MEDICINE

## 2025-01-31 PROCEDURE — 96375 TX/PRO/DX INJ NEW DRUG ADDON: CPT

## 2025-01-31 PROCEDURE — 83735 ASSAY OF MAGNESIUM: CPT | Performed by: EMERGENCY MEDICINE

## 2025-01-31 PROCEDURE — 25010000002 DEXAMETHASONE PER 1 MG: Performed by: EMERGENCY MEDICINE

## 2025-01-31 PROCEDURE — 36415 COLL VENOUS BLD VENIPUNCTURE: CPT

## 2025-01-31 PROCEDURE — 99284 EMERGENCY DEPT VISIT MOD MDM: CPT

## 2025-01-31 PROCEDURE — 84439 ASSAY OF FREE THYROXINE: CPT | Performed by: EMERGENCY MEDICINE

## 2025-01-31 PROCEDURE — 80048 BASIC METABOLIC PNL TOTAL CA: CPT | Performed by: EMERGENCY MEDICINE

## 2025-01-31 PROCEDURE — 96365 THER/PROPH/DIAG IV INF INIT: CPT

## 2025-01-31 PROCEDURE — 25010000002 METOCLOPRAMIDE PER 10 MG: Performed by: EMERGENCY MEDICINE

## 2025-01-31 PROCEDURE — 84443 ASSAY THYROID STIM HORMONE: CPT | Performed by: EMERGENCY MEDICINE

## 2025-01-31 PROCEDURE — 85025 COMPLETE CBC W/AUTO DIFF WBC: CPT | Performed by: EMERGENCY MEDICINE

## 2025-01-31 PROCEDURE — 25010000002 MAGNESIUM SULFATE IN D5W 1G/100ML (PREMIX) 1-5 GM/100ML-% SOLUTION: Performed by: EMERGENCY MEDICINE

## 2025-01-31 PROCEDURE — 25010000002 DIPHENHYDRAMINE PER 50 MG: Performed by: EMERGENCY MEDICINE

## 2025-01-31 PROCEDURE — 70450 CT HEAD/BRAIN W/O DYE: CPT

## 2025-01-31 RX ORDER — SODIUM CHLORIDE 0.9 % (FLUSH) 0.9 %
10 SYRINGE (ML) INJECTION AS NEEDED
Status: DISCONTINUED | OUTPATIENT
Start: 2025-01-31 | End: 2025-01-31 | Stop reason: HOSPADM

## 2025-01-31 RX ORDER — METOCLOPRAMIDE HYDROCHLORIDE 5 MG/ML
10 INJECTION INTRAMUSCULAR; INTRAVENOUS ONCE
Status: COMPLETED | OUTPATIENT
Start: 2025-01-31 | End: 2025-01-31

## 2025-01-31 RX ORDER — DIPHENHYDRAMINE HYDROCHLORIDE 50 MG/ML
25 INJECTION INTRAMUSCULAR; INTRAVENOUS ONCE
Status: COMPLETED | OUTPATIENT
Start: 2025-01-31 | End: 2025-01-31

## 2025-01-31 RX ORDER — DEXAMETHASONE SODIUM PHOSPHATE 10 MG/ML
10 INJECTION INTRAMUSCULAR; INTRAVENOUS ONCE
Status: COMPLETED | OUTPATIENT
Start: 2025-01-31 | End: 2025-01-31

## 2025-01-31 RX ORDER — MAGNESIUM SULFATE 1 G/100ML
1 INJECTION INTRAVENOUS ONCE
Status: COMPLETED | OUTPATIENT
Start: 2025-01-31 | End: 2025-01-31

## 2025-01-31 RX ADMIN — MAGNESIUM SULFATE 1 G: 1 INJECTION INTRAVENOUS at 18:03

## 2025-01-31 RX ADMIN — DEXAMETHASONE SODIUM PHOSPHATE 10 MG: 10 INJECTION INTRAMUSCULAR; INTRAVENOUS at 17:59

## 2025-01-31 RX ADMIN — DIPHENHYDRAMINE HYDROCHLORIDE 25 MG: 50 INJECTION INTRAMUSCULAR; INTRAVENOUS at 17:59

## 2025-01-31 RX ADMIN — METOCLOPRAMIDE 10 MG: 5 INJECTION, SOLUTION INTRAMUSCULAR; INTRAVENOUS at 17:59
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
## 2025-01-31 NOTE — PROGRESS NOTES
Follow Up Office Visit      Date: 2025   Patient Name: Jazmín Hicks  : 1970   MRN: [id number]     Chief Complaint:    Chief Complaint   Patient presents with    Migraine    Blurred Vision    Fatigue    Back Pain     Symptoms for about 3 weeks. Wakes her up at night. Really bad pain in left side of her head, caused her to wet the bed. Has fell 5 times including this morning. Has leg weakness and quaking causing no strength in her body and voice. Has nerve pain and she feels uncomfortable. Unable to sleep.        History of Present Illness: Jazmín Hicks is a 54 y.o. female who presents today reporting 4 day episode of feeling of internal quaking organs causing weakness, sleepiness, forgetfulness, temple headache, blurry vision off and on and lightning effect in her eyes.-like she is seeing heat lightning.    Staying with sister now.    Reports needs a new prescription from Pulmonology.    Referral placed for sleep medicine.    Her previously scheduled appointment had been rescheduled to today.  Patient missed appt on 25.    Follows with behavioral.  Was seen via telemedicine on 1/15/2025.    Patient treated for major depressive disorder, anxiety disorder, PTSD, sleeping difficulties.  Sertraline discontinued as patient had previously stopped taking the medication.  Was to begin Trintellix 5 mg once daily with food for mood.    Patient last seen by me on 2024.      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  History also includes DVT, hyperlipidemia, Hashimoto's, CAD, diabetes    Takes Eliquis due to history of DVT    Had previously been started on Lyrica due to neuropathy.    Had also reported they had just been monitoring her pituitary tumor.  Had reported needing neurosurgery locally.    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  Last visit reported working for [employer]  [employer] as scheduled.  Reports lost her job.  Reports they determined she was too high of a risk working there.    -----    Had previously reported she needed to find another cardiologist outside of [facility] due to scheduling.  Patient has been referred to [facility].    Uses a cane.      Patient was referred to endocrinology due to Hashimoto's, and pituitary macroadenoma.  Was referred to neurology due to Fabry's, and neuropathy.  Was referred to cardiology due to A-fib, chronic heart failure, pacemaker.      Patient has an appointment on 2/12/2025 with neurology at [facility].  8:15 AM  It is unclear currently about endocrine and cardiology referrals that were also placed for [facility].  Office is checking on these.    Reports really bad pain left side of her head.    Reports this has caused her to wet the bed.  Occurred about 3 weeks ago.    Has been having frontal headache bilateral ever since.   Reports she still has a headache, but not excruciating now.  Worse on the left.  8/10 pain.    Reports she has fallen 5 times including this morning.  Reports leg weakness and \"quaking\"    Reports she has nerve pain and is unable to sleep.    Reports blurred vision and fatigue.    Reports not taking imitrex.  Reports had side effects.        Subjective      Review of Systems:   Review of Systems   Constitutional:  Negative for chills and fever.   Cardiovascular:  Positive for leg swelling (chronic).   Gastrointestinal:  Negative for nausea and vomiting (intermittent vomiting.).   Musculoskeletal:         Leg weakness         I have reviewed the patients family history, social history, past medical history, past surgical history and have updated it as appropriate.     Medications:     Current Outpatient Medications:     albuterol sulfate  (90 Base) MCG/ACT inhaler, Inhale 2 puffs Every 4 (Four) Hours As Needed for Shortness of Air., Disp: 18 g, Rfl: 0    amLODIPine " (NORVASC) 10 MG tablet, Take 1 tablet by mouth Daily., Disp: 30 tablet, Rfl: 1    aspirin 325 MG EC tablet, Take 1 tablet by mouth Every 6 (Six) Hours As Needed for Mild Pain., Disp: 30 tablet, Rfl: 1    cetirizine (zyrTEC) 10 MG tablet, Take 1 tablet by mouth Daily., Disp: , Rfl:     colestipol (COLESTID) 1 g tablet, Take 2 tablets by mouth 2 (Two) Times a Day. Titrate to effectiveness. Do not take within 2 hours of other medications, Disp: 120 tablet, Rfl: 5    Eliquis 5 MG tablet tablet, TAKE ONE TABLET BY MOUTH EVERY 12 HOURS, Disp: 60 tablet, Rfl: 5    furosemide (LASIX) 20 MG tablet, Take 1 tablet by mouth Daily., Disp: 90 tablet, Rfl: 1    gemfibrozil (LOPID) 600 MG tablet, TAKE 1 TABLET BY MOUTH DAILY, Disp: 30 tablet, Rfl: 2    hydroCHLOROthiazide 25 MG tablet, , Disp: , Rfl:     levothyroxine (SYNTHROID, LEVOTHROID) 200 MCG tablet, Take 1 tablet by mouth Daily., Disp: 90 tablet, Rfl: 0    losartan (COZAAR) 100 MG tablet, Take 1 tablet by mouth Daily., Disp: 30 tablet, Rfl: 2    magnesium gluconate (MAGONATE) 500 MG tablet, Take 1 tablet by mouth 2 (Two) Times a Day., Disp: , Rfl:     meloxicam (MOBIC) 7.5 MG tablet, TAKE 1 TABLET BY MOUTH DAILY, Disp: 90 tablet, Rfl: 0    metFORMIN (GLUCOPHAGE) 500 MG tablet, TAKE 1 TABLET BY MOUTH TWICE A DAY WITH A MEAL, Disp: 180 tablet, Rfl: 1    metoprolol tartrate (LOPRESSOR) 25 MG tablet, TAKE 1 TABLET BY MOUTH 2 TIMES A DAY, Disp: 60 tablet, Rfl: 2    NIFEdipine CC (ADALAT CC) 60 MG 24 hr tablet, Take 1 tablet by mouth Daily., Disp: 90 tablet, Rfl: 0    nitroglycerin (NITROLINGUAL) 0.4 MG/SPRAY spray, Place 1 spray under the tongue Every 5 (Five) Minutes As Needed for Chest Pain. Repeat up to three times, then go to ER., Disp: 12 g, Rfl: 1    omeprazole (priLOSEC) 20 MG capsule, Take 1 capsule by mouth Daily., Disp: , Rfl:     pregabalin (LYRICA) 150 MG capsule, 150 mg in am and 300 mg in evening., Disp: 90 capsule, Rfl: 0    rosuvastatin (CRESTOR) 5 MG tablet,  TAKE 1 TABLET BY MOUTH DAILY, Disp: 90 tablet, Rfl: 0    spironolactone (ALDACTONE) 50 MG tablet, Take 1 tablet by mouth Daily., Disp: 90 tablet, Rfl: 1    traZODone (DESYREL) 50 MG tablet, TAKE ONE TABLET BY MOUTH ONCE NIGHTLY, Disp: 30 tablet, Rfl: 2    Vortioxetine HBr (Trintellix) 5 MG tablet tablet, Take 1 tablet by mouth Daily With Breakfast., Disp: 30 tablet, Rfl: 0    multivitamin with minerals (MULTIVITAMIN ADULT PO), Take 1 tablet by mouth Daily. Vitafusion (Patient not taking: Reported on 1/31/2025), Disp: , Rfl:     SUMAtriptan (IMITREX) 100 MG tablet, Take 1 tablet by mouth 2 (Two) Times a Day As Needed for Migraine. Take one tablet at onset of headache. May repeat dose one time in 2 hours if headache not relieved. (Patient not taking: Reported on 1/31/2025), Disp: , Rfl:     Allergies:   Allergies   Allergen Reactions    Capsaicin Shortness Of Breath    Adhesive Tape Other (See Comments)     blisters    Cat Dander Unknown - High Severity    Wound Dressings Unknown - Low Severity    Darvon [Propoxyphene] GI Intolerance    Lisinopril Nausea Only       Objective     Physical Exam: Please see above  Vital Signs:   Vitals:    01/31/25 1305   BP: 120/90   Pulse: 96   Temp: 98 °F (36.7 °C)   TempSrc: Temporal   SpO2: 97%   Weight: (!) 177 kg (391 lb)   PainSc:   8   PainLoc: Head  Comment: Both legs, lower back     Body mass index is 63.14 kg/m².          Physical Exam  Vitals and nursing note reviewed.   Constitutional:       Appearance: Normal appearance.   HENT:      Head: Normocephalic and atraumatic.   Neck:      Vascular: No carotid bruit.   Cardiovascular:      Rate and Rhythm: Normal rate and regular rhythm.      Heart sounds: Normal heart sounds. No murmur heard.  Pulmonary:      Effort: Pulmonary effort is normal.      Breath sounds: Normal breath sounds.   Abdominal:      General: Bowel sounds are normal.      Palpations: Abdomen is soft. There is no mass.      Tenderness: There is no abdominal  tenderness.   Musculoskeletal:      Right lower leg: No edema.      Left lower leg: No edema.      Comments: B/L knee and hip F/E strength 4/5.         Skin:     Coloration: Skin is not jaundiced or pale.      Findings: No erythema.   Neurological:      Mental Status: She is alert. Mental status is at baseline.      Comments: Decreased sensation mandibular area bilateral-V3 distribution.   Psychiatric:         Mood and Affect: Mood normal.         Behavior: Behavior normal.         Procedures    Results:   Labs:   Hemoglobin A1C   Date Value Ref Range Status   12/04/2024 6.60 (H) 4.80 - 5.60 % Final     TSH   Date Value Ref Range Status   01/31/2025 51.690 (H) 0.270 - 4.200 uIU/mL Final   11/05/2024 31.8 (H) 0.358 - 3.740 uIU/mL Final        POCT Results (if applicable):   Results for orders placed or performed in visit on 12/04/24   TSH    Collection Time: 12/04/24  1:17 PM    Specimen: Blood   Result Value Ref Range    TSH 50.900 (H) 0.270 - 4.200 uIU/mL   T4, Free    Collection Time: 12/04/24  1:17 PM    Specimen: Blood   Result Value Ref Range    Free T4 0.66 (L) 0.92 - 1.68 ng/dL   Magnesium    Collection Time: 12/04/24  1:17 PM    Specimen: Blood   Result Value Ref Range    Magnesium 2.0 1.6 - 2.6 mg/dL   Basic Metabolic Panel    Collection Time: 12/04/24  1:17 PM    Specimen: Blood   Result Value Ref Range    Glucose 103 (H) 65 - 99 mg/dL    BUN 15 6 - 20 mg/dL    Creatinine 1.15 (H) 0.57 - 1.00 mg/dL    Sodium 138 136 - 145 mmol/L    Potassium 4.3 3.5 - 5.2 mmol/L    Chloride 105 98 - 107 mmol/L    CO2 21.7 (L) 22.0 - 29.0 mmol/L    Calcium 9.2 8.6 - 10.5 mg/dL    BUN/Creatinine Ratio 13.0 7.0 - 25.0    Anion Gap 11.3 5.0 - 15.0 mmol/L    eGFR 57.4 (L) >60.0 mL/min/1.73   Hemoglobin A1c    Collection Time: 12/04/24  1:17 PM    Specimen: Blood   Result Value Ref Range    Hemoglobin A1C 6.60 (H) 4.80 - 5.60 %       PHQ-9: PHQ-9 Total Score:       Imaging:   No valid procedures specified.     Measures:    Advanced Care Planning:   Patient does not have an advance directive, declines further assistance.    Smoking Cessation:   Does not smoke    Assessment / Plan      Assessment/Plan:     Otilio reviewed.  Drug screen done 4/5/24  CSA on file.      Rima notified at [facility].  [id number]    Continues to have visual flashes bilateral but worse on left.  Like heat lightning during the night.  Was seen by Ophthalmology Nov or Dec. 2024 per her report.  Reports recently needs readers.  Visual acuity change noted.    Current HA pain mostly on left.  Now pain 8/10, sharp pain.  Decreased sensation V3 distribution bilaterally.    Takes Eliquis.    Due to above, patient to be seen in ER for evaluation.          1. Visual changes  1-4 patient to be evaluated in ER    2. Decreased sensation  As above    3. Migraine with aura and without status migrainosus, not intractable  As above    4. Hashimoto's disease  4-5 patient has referral in to see endocrine  Synthroid dose recently increased.      5. Acquired hypothyroidism  As above.    6. History of DVT of lower extremity  Continue Eliquis.    7. Fibromyalgia  Patient to continue Lyrica.    8. Essential hypertension  Patient to continue losartan, nifedipine, amlodipine, metoprolol.    9. Chronic heart failure, unspecified heart failure type  Patient has referral into see cardiology.  Previously following with cardiology at [facility].  Patient wanted to see cardiology at [facility].    10. Atrial fibrillation, unspecified type  Patient to continue Eliquis.  Encourage patient to keep appointment with cardiology when scheduled.    11. Pacemaker  Patient to keep appointment with cardiology when scheduled.      12. ARIADNE (obstructive sleep apnea)  Referral back to sleep medicine.  - Ambulatory Referral to Sleep Medicine    13. Fabry's disease  Patient has a referral into see neurology with appointment upcoming in early February.  Appointment 8 [facility].    14. Pituitary  macroadenoma  As above patient has appointment to see neurology.  Patient also referred to endocrinology.      Part of this note may be an electronic transcription/translation of spoken language to printed text using the Dragon Dictation System.        58 minutes were spent on this patient encounter which included history taking, physical exam, answering patient questions, counseling, discussing treatment plan, placing orders, and documentation.        Vaccine Counseling:      Follow Up:   Return in about 4 weeks (around 2/28/2025).      DO TANA Herr

## 2025-02-12 ENCOUNTER — TELEPHONE (OUTPATIENT)
Dept: PSYCHIATRY | Facility: CLINIC | Age: 55
End: 2025-02-12

## 2025-02-12 ENCOUNTER — TELEMEDICINE (OUTPATIENT)
Dept: PSYCHIATRY | Facility: CLINIC | Age: 55
End: 2025-02-12
Payer: MEDICAID

## 2025-02-12 DIAGNOSIS — F41.9 ANXIETY DISORDER, UNSPECIFIED TYPE: Chronic | ICD-10-CM

## 2025-02-12 DIAGNOSIS — F33.1 MAJOR DEPRESSIVE DISORDER, RECURRENT EPISODE, MODERATE: Primary | Chronic | ICD-10-CM

## 2025-02-12 NOTE — TELEPHONE ENCOUNTER
----- Message from Bridgette Vasquez sent at 2/12/2025 12:12 PM EST -----  Ladies, this patient was seen by me last month and we started Trintellix.  She reports today, a month later, she has not been able to start the Trintellix because she is waiting on a prior authorization.  The patient reports she thinks it is something on the pharmacy then, and not our end, but can someone check into this, because that sounds odd.  Also, one complication may be that she reports her insurance has changed a couple of times in the past couple of months, so if there was a previous prior authorization completed, it could have been completed on a previous insurance, but now needs to be done through her current insurance?  She told me today that the pharmacy told her that prior authorizations just sometimes take time, so I told her we would check into this for her on our end.  Thank you.

## 2025-02-12 NOTE — TELEPHONE ENCOUNTER
PA has been approved. Key is [id number]. Patient made aware.  Patient will contact pharmacy.   had to leave a voicemail with pharmacy to make pharmacy aware.

## 2025-02-12 NOTE — PROGRESS NOTES
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    A/G Ratio 07/26/2024 1.3  g/dL Final    BUN/Creatinine Ratio 07/26/2024 14.2  7.0 - 25.0 Final    Anion Gap 07/26/2024 11.5  5.0 - 15.0 mmol/L Final    eGFR 07/26/2024 47.8 (L)  >60.0 mL/min/1.73 Final   Admission on 04/29/2024, Discharged on 04/29/2024   Component Date Value Ref Range Status    QT Interval 04/29/2024 378  ms Final    QTC Interval 04/29/2024 457  ms Final    QT Interval 04/29/2024 406  ms Final    QTC Interval 04/29/2024 471  ms Final    HS Troponin T 04/29/2024 8  <14 ng/L Final    Glucose 04/29/2024 97  65 - 99 mg/dL Final    BUN 04/29/2024 12  6 - 20 mg/dL Final    Creatinine 04/29/2024 0.84  0.57 - 1.00 mg/dL Final    Sodium 04/29/2024 142  136 - 145 mmol/L Final    Potassium 04/29/2024 3.5  3.5 - 5.2 mmol/L Final    Chloride 04/29/2024 105  98 - 107 mmol/L Final    CO2 04/29/2024 26.0  22.0 - 29.0 mmol/L Final    Calcium 04/29/2024 9.4  8.6 - 10.5 mg/dL Final    Total Protein 04/29/2024 7.6  6.0 - 8.5 g/dL Final    Albumin 04/29/2024 4.1  3.5 - 5.2 g/dL Final    ALT (SGPT) 04/29/2024 24  1 - 33 U/L Final    AST (SGOT) 04/29/2024 23  1 - 32 U/L Final    Alkaline Phosphatase 04/29/2024 105  39 - 117 U/L Final    Total Bilirubin 04/29/2024 0.3  0.0 - 1.2 mg/dL  Final    Globulin 04/29/2024 3.5  gm/dL Final    Calculated Result    A/G Ratio 04/29/2024 1.2  g/dL Final    BUN/Creatinine Ratio 04/29/2024 14.3  7.0 - 25.0 Final    Anion Gap 04/29/2024 11.0  5.0 - 15.0 mmol/L Final    eGFR 04/29/2024 83.7  >60.0 mL/min/1.73 Final    Lipase 04/29/2024 24  13 - 60 U/L Final    proBNP 04/29/2024 358.4  0.0 - 900.0 pg/mL Final    Extra Tube 04/29/2024 Hold for add-ons.   Final    Auto resulted.    Extra Tube 04/29/2024 hold for add-on   Final    Auto resulted    Extra Tube 04/29/2024 Hold for add-ons.   Final    Auto resulted.    Extra Tube 04/29/2024 Hold for add-ons.   Final    Auto resulted.    Extra Tube 04/29/2024 Hold for add-ons.   Final    Auto resulted    WBC 04/29/2024 7.61  3.40 - 10.80 10*3/mm3 Final    RBC 04/29/2024 4.73  3.77 - 5.28 10*6/mm3 Final    Hemoglobin 04/29/2024 12.6  12.0 - 15.9 g/dL Final    Hematocrit 04/29/2024 39.6  34.0 - 46.6 % Final    MCV 04/29/2024 83.7  79.0 - 97.0 fL Final    MCH 04/29/2024 26.6  26.6 - 33.0 pg Final    MCHC 04/29/2024 31.8  31.5 - 35.7 g/dL Final    RDW 04/29/2024 14.1  12.3 - 15.4 % Final    RDW-SD 04/29/2024 43.1  37.0 - 54.0 fl Final    MPV 04/29/2024 10.4  6.0 - 12.0 fL Final    Platelets 04/29/2024 309  140 - 450 10*3/mm3 Final    Neutrophil % 04/29/2024 60.4  42.7 - 76.0 % Final    Lymphocyte % 04/29/2024 32.1  19.6 - 45.3 % Final    Monocyte % 04/29/2024 4.9 (L)  5.0 - 12.0 % Final    Eosinophil % 04/29/2024 1.4  0.3 - 6.2 % Final    Basophil % 04/29/2024 0.7  0.0 - 1.5 % Final    Immature Grans % 04/29/2024 0.5  0.0 - 0.5 % Final    Neutrophils, Absolute 04/29/2024 4.60  1.70 - 7.00 10*3/mm3 Final    Lymphocytes, Absolute 04/29/2024 2.44  0.70 - 3.10 10*3/mm3 Final    Monocytes, Absolute 04/29/2024 0.37  0.10 - 0.90 10*3/mm3 Final    Eosinophils, Absolute 04/29/2024 0.11  0.00 - 0.40 10*3/mm3 Final    Basophils, Absolute 04/29/2024 0.05  0.00 - 0.20 10*3/mm3 Final    Immature Grans, Absolute 04/29/2024 0.04  0.00 - 0.05  10*3/mm3 Final    nRBC 04/29/2024 0.0  0.0 - 0.2 /100 WBC Final    HS Troponin T 04/29/2024 <6  <14 ng/L Final    Troponin T Numeric Delta 04/29/2024    Final    Unable to calculate.   Office Visit on 04/05/2024   Component Date Value Ref Range Status    Hemoglobin A1C 04/05/2024 5.8 (A)  4.5 - 5.7 % Final    Lot Number 04/05/2024 [id number]   Final    Expiration Date 04/05/2024 12/17/2025   Final    Microalbumin, Urine 04/05/2024 10   Final    Creatinine, Urine 04/05/2024 50   Final    Lot Number 04/05/2024 [id number]   Final    Expiration Date 04/05/2024 05/09/2025   Final    TSH 04/05/2024 2.370  0.270 - 4.200 uIU/mL Final    Free T4 04/05/2024 1.37  0.93 - 1.70 ng/dL Final    Report Summary 04/05/2024 FINAL   Final    Comment: ====================================================================  [facility]  ====================================================================  Test                             Result       Flag       Units  Drug Present    Acetaminophen                  PRESENT    Ibuprofen                      PRESENT    Diphenhydramine                PRESENT  ====================================================================  Test                      Result    Flag   Units      Ref Range    Creatinine              89               mg/dL      >=20  ====================================================================  Declared Medications:   Medication list was not provided.  ====================================================================  For clinical consultation, please call [phone].  ====================================================================             Assessment & Plan   Problems Addressed this Visit    None  Visit Diagnoses       Major depressive disorder, recurrent episode, moderate  (Chronic)   -  Primary    Relevant Medications    Vortioxetine HBr (Trintellix) 5 MG tablet tablet    Anxiety disorder, unspecified type  (Chronic)        Relevant Medications    Vortioxetine HBr (Trintellix) 5 MG tablet tablet          Diagnoses         Codes Comments    Major depressive disorder, recurrent episode, moderate    -  Primary ICD-10-CM: F33.1  ICD-9-CM: 296.32     Anxiety disorder, unspecified type     ICD-10-CM: F41.9  ICD-9-CM: 300.00             Visit Diagnoses:    ICD-10-CM ICD-9-CM   1. Major depressive disorder, recurrent episode, moderate  F33.1 296.32   2. Anxiety disorder, unspecified type  F41.9 300.00           GOALS:  Short Term Goals: Patient will be compliant with medication, and patient will have no significant medication related side effects.  Patient will be engaged in psychotherapy as indicated.  Patient will report subjective improvement of symptoms.  Long term goals: To stabilize mood and treat/improve subjective symptoms, the patient will stay out of the hospital, the patient will be at an optimal level of functioning, and the patient will take all medications as prescribed.  The patient verbalized understanding and agreement with goals that were mutually set.      TREATMENT PLAN: Re-start supportive psychotherapy efforts and take medications as indicated.  Medication and treatment options, both pharmacological and non-pharmacological treatment options, discussed during today's visit, including any off label use of medication. Patient acknowledged and verbally consented with current treatment plan and was educated on the importance of compliance with treatment and follow-up appointments.      -Begin Trintellix 5 mg by mouth once daily in the morning with food for mood.  -The patient is prescribed Pregabalin by an outside provider.  -The patient is prescribed Trazodone by primary care.  -Primary care has referred the patient to cardiology, neurology, endocrinology, and sleep medicine.  -The patient is scheduled to restart psychotherapy 3/18/2025.    Patient aware of limitations of providers availability and office hours, and how to  reach provider/office if needed (office number for patient to call for any questions/concerns: [phone]). It has been discussed with the patient if the patient's needs require more frequent or intensive management/monitoring than can be provided from this provider utilizing a strictly virtual platform, or care that is outside of this provider's scope, then patient may be referred to more appropriate provider or modality. Patient verbalized understanding and agreement in her own words.       MEDICATION ISSUES:  Discussed medication options and treatment plan of prescribed medication, any off label use of medication, as well as the risks, benefits, any black box warnings including increased suicidality, and side effects including but not limited to potential falls, dizziness, possible impaired driving, GI side effects (change in appetite, abdominal discomfort, nausea, vomiting, diarrhea, and/or constipation), dry mouth, somnolence, sedation, insomnia, activation, agitation, irritation, tremors, abnormal muscle movements or disorders, headache, sweating, possible bruising or rare bleeding, electrolyte and/or fluid abnormalities, change in blood pressure/heart rate/and or heart rhythm, sexual dysfunction, and metabolic adversities among others. Patient and/or guardian agreeable to call the office with any worsening of symptoms or onset of side effects, or if any concerns or questions arise.  The contact information for the office is made available to the patient and/or guardian.  Patient and/or guardian agreeable to call 911 or go to the nearest ER should they begin having any SI/HI, or if any urgent concerns arise.    Due to the nature of virtual visits and inability to monitor vital signs and weight with virtual visits, the patient has been encouraged to monitor their vital signs and weight regularly either through self-monitoring via home device(s) or with their Primary Care Provider, and the patient has been  instructed to notify this APRN of any abnormalities or changes from baseline.      VERBAL INFORMED CONSENT FOR MEDICATION:  The patient was educated that their proposed/prescribed psychotropic medication(s) has potential risks, side effects, adverse effects, and black box warnings; and these have been discussed with the patient.  The patient has been informed that their treatment and medication dosage is to be individualized, and may even be above or below the recommended range/dosage due to patient individualization and response, but medication is prescribed using a shared decision making approach, and no medication or dosage will be prescribed without the patient's verbal consent.  The reason for the use of the medication including any off label use and alternative modes of treatment other than or in addition to medication has been considered and discussed, the probable consequences of not receiving the proposed treatment have been discussed, and any treatment side effects, black box warnings, and cautions associated with treatment have been discussed with the patient.  The patient is allowed ample time to openly discuss and ask questions regarding the proposed medication(s) and treatment plan and the patient verbalizes understanding the reasons for the use of the medication, its potential risks and benefits, other alternative treatment(s), and the probable consequences that may occur if the proposed medication is not given.  The patient has been given ample time to ask questions and study the information and find the information to be specific, accurate, and complete.  The patient gives verbal consent for the medication(s) proposed/prescribed, they verbalized understanding that they can refuse and withdraw consent at any time with the assistance of this APRN, and the patient has verbally confirmed that they are aware, and are willing, to take the prescribed medication and follow the treatment plan with the known  possible risks, side effect, black box warnings, and any potential medication interactions, and the patient reports they will be worse off without this medication and treatment plan.  The patient is advised to contact this APRN/this office if any questions or concerns arise at any time (at [phone]), or call 911/go to the closest emergency department if needed or outside of office hours.      SUICIDE RISK ASSESSMENT AND SAFETY PLAN: Unalterable demographics and a history of mental health intervention indicate this patient is in a high risk category compared to the general population. At present, the patient denies active SI/HI, intentions, or plans at this time and agrees to seek immediate care should such thoughts develop. The patient verbalizes understanding of how to access emergency care if needed and agrees to do so. Consideration of suicide risk and protective factors such as history, current presentation, individual strengths and weaknesses, psychosocial and environmental stressors and variables, psychiatric illness and symptoms, medical conditions and pain, took place in this interview. Based on those considerations, the patient is determined: within individual baseline and presenting no imminent risk for suicide or homicide. Other recommendations: The patient does not meet the criteria for inpatient admission and is not a safety risk to self or others at today's visit. Inpatient treatment offers no significant advantages over outpatient treatment for this patient at today's visit.  The patient was given ample time for questions and fully participated in treatment planning.  The patient was encouraged to call the clinic with any questions or concerns.  The patient was informed of access to emergency care. If patient were to develop any significant symptomatology, suicidal ideation, homicidal ideation, any concerns, or feel unsafe at any time they are to call the clinic and if unable to get immediate  assistance should immediately call 911 or go to the nearest emergency room.  Patient contracted verbally for the following: If you are experiencing an emotional crisis or have thoughts of harming yourself or others, please go to your nearest local emergency room or call 911. Will continue to re-assess medication response and side effects frequently to establish efficacy and ensure safety. Risks, any black box warnings, side effects, off label usage, and benefits of medication and treatment discussed with patient, along with potential adverse side effects of current and/or newly prescribed medication, alternative treatment options, and OTC medications.  Patient verbalized understanding of potential risks, any off label use of medication, any black box warnings, and any side effects in their own words. The patient verbalized understanding and agreed to comply with the safety plan discussed in their own words.  Patient given the number to the office. Number also discussed of the 24- hour suicide hotline.           MEDS ORDERED DURING VISIT:  New Medications Ordered This Visit   Medications    Vortioxetine HBr (Trintellix) 5 MG tablet tablet     Sig: Take 1 tablet by mouth Daily With Breakfast.     Dispense:  30 tablet     Refill:  0       Return in about 4 weeks (around 3/12/2025), or if symptoms worsen or fail to improve, for Next scheduled follow up and Recheck.         Progress towards goal: Not at goal    Functional Status: Moderate impairment     Prognosis: Good with Ongoing Treatment             This document has been electronically signed by BONITA Raza  February 12, 2025 16:00 EST    Some of the data in this electronic note has been brought forward from a previous encounter, any necessary changes have been made, it has been reviewed by this APRN, and it is accurate.    Please note that portions of this note were completed with a voice recognition program.

## 2025-03-12 ENCOUNTER — TELEMEDICINE (OUTPATIENT)
Dept: PSYCHIATRY | Facility: CLINIC | Age: 55
End: 2025-03-12
Payer: MEDICAID

## 2025-03-12 ENCOUNTER — LAB (OUTPATIENT)
Dept: LAB | Facility: HOSPITAL | Age: 55
End: 2025-03-12
Payer: MEDICAID

## 2025-03-12 ENCOUNTER — TRANSCRIBE ORDERS (OUTPATIENT)
Dept: LAB | Facility: HOSPITAL | Age: 55
End: 2025-03-12
Payer: MEDICAID

## 2025-03-12 ENCOUNTER — OFFICE VISIT (OUTPATIENT)
Dept: FAMILY MEDICINE CLINIC | Facility: CLINIC | Age: 55
End: 2025-03-12
Payer: MEDICAID

## 2025-03-12 VITALS
OXYGEN SATURATION: 97 % | TEMPERATURE: 98.1 F | HEIGHT: 66 IN | RESPIRATION RATE: 20 BRPM | BODY MASS INDEX: 47.09 KG/M2 | SYSTOLIC BLOOD PRESSURE: 116 MMHG | DIASTOLIC BLOOD PRESSURE: 60 MMHG | HEART RATE: 76 BPM | WEIGHT: 293 LBS

## 2025-03-12 DIAGNOSIS — E03.9 ACQUIRED HYPOTHYROIDISM: ICD-10-CM

## 2025-03-12 DIAGNOSIS — Z51.81 THERAPEUTIC DRUG MONITORING: ICD-10-CM

## 2025-03-12 DIAGNOSIS — M25.50 POLYARTHRALGIA: ICD-10-CM

## 2025-03-12 DIAGNOSIS — E06.3 HASHIMOTO'S DISEASE: ICD-10-CM

## 2025-03-12 DIAGNOSIS — Z86.718 HISTORY OF DVT OF LOWER EXTREMITY: ICD-10-CM

## 2025-03-12 DIAGNOSIS — M79.7 FIBROMYALGIA: ICD-10-CM

## 2025-03-12 DIAGNOSIS — E53.8: ICD-10-CM

## 2025-03-12 DIAGNOSIS — I10 ESSENTIAL HYPERTENSION: ICD-10-CM

## 2025-03-12 DIAGNOSIS — G62.9 NEUROPATHY: ICD-10-CM

## 2025-03-12 DIAGNOSIS — E78.1 HYPERTRIGLYCERIDEMIA: ICD-10-CM

## 2025-03-12 DIAGNOSIS — E11.40 TYPE 2 DIABETES MELLITUS WITH DIABETIC NEUROPATHY, WITHOUT LONG-TERM CURRENT USE OF INSULIN: Primary | ICD-10-CM

## 2025-03-12 DIAGNOSIS — Z86.19 HISTORY OF LYME DISEASE: ICD-10-CM

## 2025-03-12 DIAGNOSIS — F33.1 MAJOR DEPRESSIVE DISORDER, RECURRENT EPISODE, MODERATE: Primary | Chronic | ICD-10-CM

## 2025-03-12 DIAGNOSIS — F41.9 ANXIETY DISORDER, UNSPECIFIED TYPE: Chronic | ICD-10-CM

## 2025-03-12 DIAGNOSIS — D35.2 PITUITARY EPIDERMOID TUMOR: Primary | ICD-10-CM

## 2025-03-12 DIAGNOSIS — D35.2 PITUITARY MACROADENOMA: ICD-10-CM

## 2025-03-12 DIAGNOSIS — M35.9 AUTOIMMUNE DISEASE: ICD-10-CM

## 2025-03-12 DIAGNOSIS — D35.2 PITUITARY EPIDERMOID TUMOR: ICD-10-CM

## 2025-03-12 LAB
CORTIS SERPL-MCNC: 12.4 MCG/DL
EXPIRATION DATE: ABNORMAL
EXPIRATION DATE: NORMAL
FOLATE SERPL-MCNC: 10.5 NG/ML (ref 4.78–24.2)
HBA1C MFR BLD: 6.9 % (ref 4.5–5.7)
Lab: ABNORMAL
Lab: NORMAL
POC ALBUMIN, URINE: 10 MG/L
POC CREATININE, URINE: 50 MG/DL
POC URINE ALB/CREA RATIO: <30
VIT B12 BLD-MCNC: 515 PG/ML (ref 211–946)

## 2025-03-12 PROCEDURE — 82746 ASSAY OF FOLIC ACID SERUM: CPT

## 2025-03-12 PROCEDURE — 86334 IMMUNOFIX E-PHORESIS SERUM: CPT

## 2025-03-12 PROCEDURE — 82607 VITAMIN B-12: CPT

## 2025-03-12 PROCEDURE — 82784 ASSAY IGA/IGD/IGG/IGM EACH: CPT

## 2025-03-12 PROCEDURE — 36415 COLL VENOUS BLD VENIPUNCTURE: CPT

## 2025-03-12 PROCEDURE — 82533 TOTAL CORTISOL: CPT

## 2025-03-12 RX ORDER — APIXABAN 5 MG/1
5 TABLET, FILM COATED ORAL EVERY 12 HOURS SCHEDULED
Qty: 60 TABLET | Refills: 5 | Status: SHIPPED | OUTPATIENT
Start: 2025-03-12

## 2025-03-12 RX ORDER — COLESTIPOL HYDROCHLORIDE 1 G/1
2 TABLET ORAL 2 TIMES DAILY
Start: 2025-03-12

## 2025-03-12 RX ORDER — TRAZODONE HYDROCHLORIDE 50 MG/1
50 TABLET ORAL DAILY
COMMUNITY
Start: 2024-11-15

## 2025-03-12 RX ORDER — LEVOTHYROXINE SODIUM 200 UG/1
200 TABLET ORAL DAILY
Qty: 90 TABLET | Refills: 0 | Status: SHIPPED | OUTPATIENT
Start: 2025-03-12

## 2025-03-12 RX ORDER — UBROGEPANT 100 MG/1
100 TABLET ORAL
COMMUNITY
Start: 2025-02-26

## 2025-03-12 RX ORDER — METOPROLOL TARTRATE 25 MG/1
25 TABLET, FILM COATED ORAL 2 TIMES DAILY
Qty: 60 TABLET | Refills: 2 | Status: SHIPPED | OUTPATIENT
Start: 2025-03-12

## 2025-03-12 RX ORDER — PREGABALIN 150 MG/1
CAPSULE ORAL
Qty: 90 CAPSULE | Refills: 2 | Status: SHIPPED | OUTPATIENT
Start: 2025-03-12

## 2025-03-12 RX ORDER — ASPIRIN 325 MG
325 TABLET, DELAYED RELEASE (ENTERIC COATED) ORAL DAILY
COMMUNITY
Start: 2025-01-02 | End: 2025-03-12 | Stop reason: DRUGHIGH

## 2025-03-12 RX ORDER — LOSARTAN POTASSIUM 100 MG/1
100 TABLET ORAL DAILY
Qty: 30 TABLET | Refills: 2 | Status: SHIPPED | OUTPATIENT
Start: 2025-03-12

## 2025-03-12 RX ORDER — AMLODIPINE BESYLATE 10 MG/1
10 TABLET ORAL DAILY
Qty: 30 TABLET | Refills: 1 | Status: SHIPPED | OUTPATIENT
Start: 2025-03-12

## 2025-03-12 NOTE — PROGRESS NOTES
"    Follow Up Office Visit      Date: 03/10/2025   Patient Name: Jazmín Hicks  : 1970   MRN: [id number]     Chief Complaint:    Chief Complaint   Patient presents with    Fibromyalgia     Physical therapy referral and lab work want from [facility].    Referral Rhuematology    Referral to Endocronilogist       History of Present Illness: Jazmín Hicks is a 54 y.o. female who presents today for follow-up for fibromyalgia    With sister in office today.    When patient was seen last on 2025, she had reported really bad pain on the left side of her head.  Had reported this occurred 3 weeks prior to her visit.  Had reported having frontal headache bilateral ever since.  Patient had also reported following multiple times-5 times including this morning.  Had reported leg weakness and \"guaking\".      Had also reported not taking Imitrex due to side effects.    Behavioral.  Is seen by telemedicine.  Treated for major depressive disorder, anxiety disorder, PTSD, sleeping difficulties.  Sertraline previously discontinued as patient had previously stopped taking the medication.  Was to begin Trintellix 5 mg once daily with food for mood.      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  History also includes DVT, hyperlipidemia, Hashimoto's, CAD, diabetes.    Takes Eliquis due to history of DVT.    Had previously been started on Lyrica due to neuropathy.    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    Patient had previously reported she needed to find another cardiologist outside of [facility] due to scheduling.  Had been referred to [facility] cardiology.    Uses a cane.      Patient was referred to endocrinology due to Hashimoto's, and pituitary microadenoma.  Was referred to neurology due to Fabry's " GYN Annual Exam     Chief Complaint   Patient presents with   • Gynecologic Exam     Annual. Pap- 2020 Mammo- Today. Colonoscopy-       Rima Mcmahon is a 49 y.o. female who presents for annual well woman exam. She is having regular menses, lasting approx 3-4 days. She denies vaginal spotting or discharge. She completes SBE monthly. Hx HPV. She has completed both covid 19 vaccines and is planning a flu vaccine in the near future. She has no complaints today.    This is my first time meeting Rima Mcmahon  She is a pt of Dr. Carl's.      OB History        2    Para   2    Term                AB        Living           SAB        IAB        Ectopic        Molar        Multiple        Live Births                    Mammogram: today  Colonoscopy:   Last Pap :  negative, HPV negative  History of abnormal Pap smear: no  Family history of uterine, colon or ovarian cancer: no  Family history of breast cancer: no  History of abnormal mammogram: no    Past Medical History:   Diagnosis Date   • HPV (human papilloma virus) infection    • Hyperlipidemia    • Hypertension        Past Surgical History:   Procedure Laterality Date   • LAPAROSCOPIC CHOLECYSTECTOMY           Current Outpatient Medications:   •  cetirizine (zyrTEC) 10 MG tablet, , Disp: , Rfl:   •  ibuprofen (ADVIL,MOTRIN) 600 MG tablet, TK 1 T PO Q 6 H PRF PAIN, Disp: , Rfl:   •  triamterene-hydrochlorothiazide (MAXZIDE) 75-50 MG per tablet, TAKE 1 TABLET BY MOUTH EVERY DAY, Disp: , Rfl:     Allergies   Allergen Reactions   • Latex Other (See Comments)     Pt unsure, was told with allergy testing       Social History     Tobacco Use   • Smoking status: Never Smoker   • Smokeless tobacco: Not on file   Substance Use Topics   • Alcohol use: Yes   • Drug use: No       Family History   Problem Relation Age of Onset   • Hypertension Father    • Hypertension Mother    • Throat cancer Maternal Grandmother        Review of Systems    Constitutional: Negative for chills, fatigue and fever.   Gastrointestinal: Negative for abdominal distention, abdominal pain, nausea and vomiting.   Endocrine: Negative for cold intolerance and heat intolerance.   Genitourinary: Negative for dyspareunia, dysuria, menstrual problem, pelvic pain, vaginal bleeding, vaginal discharge and vaginal pain.   Musculoskeletal: Negative for gait problem.   Skin: Negative for rash.   Neurological: Negative for dizziness and headaches.   Hematological: Does not bruise/bleed easily.   Psychiatric/Behavioral: Negative for behavioral problems.       /84   Pulse 89   Wt 128 kg (282 lb)   BMI 46.93 kg/m²     Physical Exam  Constitutional:       Appearance: Normal appearance.   Genitourinary:      Vulva, bladder and urethral meatus normal.      No lesions in the vagina.      Right Labia: No rash, tenderness, lesions, skin changes or Bartholin's cyst.     Left Labia: No tenderness, lesions, skin changes, Bartholin's cyst or rash.     No labial fusion noted.      No inguinal adenopathy present in the right or left side.     No vaginal discharge, erythema, tenderness, bleeding or ulceration.      No vaginal prolapse present.     No vaginal atrophy present.       Right Adnexa: not tender, not full, not palpable, no mass present and not absent.     Left Adnexa: not tender, not full, not palpable, no mass present and not absent.     No cervical motion tenderness, discharge, friability, lesion, polyp, nabothian cyst or eversion.      Uterus is not enlarged, fixed, tender, irregular or prolapsed.      No uterine mass detected.     No urethral tenderness or mass present.      Pelvic exam was performed with patient in the lithotomy position.   Breasts: Breasts are symmetrical.      Right: Present. No swelling, bleeding, inverted nipple, mass, nipple discharge, skin change, tenderness, breast implant, axillary adenopathy or supraclavicular adenopathy.      Left: Present. No swelling,  and neuropathy.  Was referred to cardiology due to A-fib, chronic heart failure, pacemaker.  ----    Per chart review, patient has been seen by BONITA Valerio at [facility] on 3/10/2025.    Patient was referred to physical therapy to help with balance problem and falls, evaluate and treat and plan of care.  Due to pituitary microadenoma, patient was to have cortisol level checked, MR brain with out and with IV contrast.  Was referred to neurosurgery  Patient was also to have B12 level and folate checked as well as immunofixation serum.  She was prescribed Ubrelvy to help with migraines.  EMG with NCS ordered due to numbness and tingling in both hands.      Was seen by Cardiology on 3/7/25  [facility].    No appt yet with PT    Reports both requested referral to Endocrinology.  May have to see .  Reports they want her to see Rheumatology due to Autoimmune disease, Fabry, hx of Lyme Disease, and FM.  Was not referred to Rheumatology.      Now taking Jardiance per Cardiology  CHF.    Ubrelvy per Neurology.      Subjective      Review of Systems:   Review of Systems   Constitutional:  Negative for chills and fever.   Gastrointestinal:  Positive for nausea (some with new depression medicine.). Negative for vomiting.   Neurological:  Negative for seizures and syncope.       I have reviewed the patients family history, social history, past medical history, past surgical history and have updated it as appropriate.     Medications:     Current Outpatient Medications:     albuterol sulfate  (90 Base) MCG/ACT inhaler, Inhale 2 puffs Every 4 (Four) Hours As Needed for Shortness of Air., Disp: 18 g, Rfl: 0    amLODIPine (NORVASC) 10 MG tablet, Take 1 tablet by mouth Daily., Disp: 30 tablet, Rfl: 1    cetirizine (zyrTEC) 10 MG tablet, Take 1 tablet by mouth Daily., Disp: , Rfl:     colestipol (COLESTID) 1 g tablet, Take 2 tablets by mouth 2 (Two) Times a Day.  Titrate to effectiveness. Do not take within 2 hours of other medications, Disp: , Rfl:     Eliquis 5 MG tablet tablet, Take 1 tablet by mouth Every 12 (Twelve) Hours., Disp: 60 tablet, Rfl: 5    empagliflozin (JARDIANCE) 10 MG tablet tablet, Take 1 tablet by mouth Daily., Disp: , Rfl:     furosemide (LASIX) 20 MG tablet, Take 1 tablet by mouth Daily., Disp: 90 tablet, Rfl: 1    gemfibrozil (LOPID) 600 MG tablet, TAKE 1 TABLET BY MOUTH DAILY, Disp: 30 tablet, Rfl: 2    hydroCHLOROthiazide 25 MG tablet, , Disp: , Rfl:     levothyroxine (SYNTHROID, LEVOTHROID) 200 MCG tablet, Take 1 tablet by mouth Daily., Disp: 90 tablet, Rfl: 0    losartan (COZAAR) 100 MG tablet, Take 1 tablet by mouth Daily., Disp: 30 tablet, Rfl: 2    magnesium gluconate (MAGONATE) 500 MG tablet, Take 1 tablet by mouth 2 (Two) Times a Day., Disp: , Rfl:     meloxicam (MOBIC) 7.5 MG tablet, TAKE 1 TABLET BY MOUTH DAILY, Disp: 90 tablet, Rfl: 0    metFORMIN (GLUCOPHAGE) 500 MG tablet, Take 1 tablet by mouth 2 (Two) Times a Day With Meals., Disp: 180 tablet, Rfl: 1    metoprolol tartrate (LOPRESSOR) 25 MG tablet, Take 1 tablet by mouth 2 (Two) Times a Day., Disp: 60 tablet, Rfl: 2    NIFEdipine CC (ADALAT CC) 60 MG 24 hr tablet, Take 1 tablet by mouth Daily., Disp: 90 tablet, Rfl: 0    nitroglycerin (NITROLINGUAL) 0.4 MG/SPRAY spray, Place 1 spray under the tongue Every 5 (Five) Minutes As Needed for Chest Pain. Repeat up to three times, then go to ER., Disp: 12 g, Rfl: 1    omeprazole (priLOSEC) 20 MG capsule, Take 1 capsule by mouth Daily., Disp: , Rfl:     pregabalin (LYRICA) 150 MG capsule, 150 mg in am and 300 mg in evening., Disp: 90 capsule, Rfl: 2    rosuvastatin (CRESTOR) 5 MG tablet, TAKE 1 TABLET BY MOUTH DAILY, Disp: 90 tablet, Rfl: 0    spironolactone (ALDACTONE) 50 MG tablet, Take 1 tablet by mouth Daily., Disp: 90 tablet, Rfl: 1    traZODone (DESYREL) 50 MG tablet, Take 1 tablet by mouth Daily., Disp: , Rfl:     Ubrelvy 100 MG tablet,  bleeding, inverted nipple, mass, nipple discharge, skin change, tenderness, breast implant, axillary adenopathy or supraclavicular adenopathy.       HENT:      Head: Normocephalic and atraumatic.   Eyes:      Pupils: Pupils are equal, round, and reactive to light.   Cardiovascular:      Rate and Rhythm: Normal rate.   Pulmonary:      Effort: Pulmonary effort is normal.   Abdominal:      General: There is no distension.      Palpations: Abdomen is soft. There is no mass.      Tenderness: There is no abdominal tenderness. There is no guarding.      Hernia: No hernia is present. There is no hernia in the left inguinal area or right inguinal area.   Musculoskeletal:         General: Normal range of motion.      Cervical back: Normal range of motion and neck supple. No tenderness.   Lymphadenopathy:      Cervical: No cervical adenopathy.      Upper Body:      Right upper body: No supraclavicular, axillary or pectoral adenopathy.      Left upper body: No supraclavicular, axillary or pectoral adenopathy.      Lower Body: No right inguinal adenopathy. No left inguinal adenopathy.   Neurological:      General: No focal deficit present.      Mental Status: She is alert and oriented to person, place, and time.      Cranial Nerves: No cranial nerve deficit.   Skin:     General: Skin is warm and dry.   Psychiatric:         Mood and Affect: Mood normal.         Behavior: Behavior normal.         Thought Content: Thought content normal.         Judgment: Judgment normal.   Vitals and nursing note reviewed.       Assessment    Diagnoses and all orders for this visit:    1. Women's annual routine gynecological examination (Primary)       Plan     1) Breast Health - Clinical breast exam & mammogram yearly, Self breast awareness. Schedule screening mammogram.   2) Pap - up to date  3) STD-no  4) Smoking status- nonsmoker  5) Colon health - screening colonoscopy recommended if not up to date  6) Patient's Body mass index is 46.93  "Take 1 tablet by mouth., Disp: , Rfl:     Vortioxetine HBr (Trintellix) 5 MG tablet tablet, Take 1 tablet by mouth Daily With Breakfast., Disp: 30 tablet, Rfl: 0    multivitamin with minerals (MULTIVITAMIN ADULT PO), Take 1 tablet by mouth Daily. Vitafusion (Patient not taking: Reported on 3/12/2025), Disp: , Rfl:     Allergies:   Allergies   Allergen Reactions    Capsaicin Shortness Of Breath    Adhesive Tape Other (See Comments)     blisters    Cat Dander Unknown - High Severity    Wound Dressings Unknown - Low Severity    Lisinopril Nausea Only    Propoxyphene GI Intolerance and Nausea And Vomiting     Other Reaction(s): Vomiting       Objective     Physical Exam: Please see above  Vital Signs:   Vitals:    03/12/25 1300   BP: 116/60   BP Location: Left arm   Patient Position: Sitting   Cuff Size: Large Adult   Pulse: 76   Resp: 20   Temp: 98.1 °F (36.7 °C)   TempSrc: Temporal   SpO2: 97%   Weight: (!) 173 kg (382 lb 3.2 oz)   Height: 167.6 cm (65.98\")   PainSc: 7    PainLoc: Generalized     Body mass index is 61.72 kg/m².          Physical Exam  Vitals and nursing note reviewed.   Constitutional:       Appearance: Normal appearance.   HENT:      Head: Normocephalic and atraumatic.   Neck:      Vascular: No carotid bruit.   Cardiovascular:      Rate and Rhythm: Normal rate and regular rhythm.      Heart sounds: Normal heart sounds. No murmur heard.  Pulmonary:      Effort: Pulmonary effort is normal.      Breath sounds: Normal breath sounds.   Abdominal:      General: Bowel sounds are normal.      Palpations: Abdomen is soft. There is no mass.      Tenderness: There is no abdominal tenderness.   Musculoskeletal:      Right lower leg: No edema.      Left lower leg: No edema.   Skin:     Coloration: Skin is not jaundiced or pale.      Findings: No erythema.   Neurological:      Mental Status: She is alert. Mental status is at baseline.   Psychiatric:         Mood and Affect: Mood normal.         Behavior: Behavior " kg/m². indicating that she is morbidly obese by BMI (BMI > 40 or > 35 with obesity - related health condition).   7) Bone health - Weight bearing exercise, dietary calcium recommendations and vitamin D  reviewed.   8) Encouraged 150 minutes of exercise per week if not medically contraindicated    Follow up prn and one year    Patricia Tillman, KIKE  11/18/2021  09:50 EST     normal.       7/2022-MRI brain with and without was read as no acute intracranial abnormalities, a pituitary microadenoma was noted at that time   Procedures    Results:   Labs:   Hemoglobin A1C   Date Value Ref Range Status   03/12/2025 6.9 (A) 4.5 - 5.7 % Final   12/04/2024 6.60 (H) 4.80 - 5.60 % Final     TSH   Date Value Ref Range Status   01/31/2025 51.690 (H) 0.270 - 4.200 uIU/mL Final   11/05/2024 31.8 (H) 0.358 - 3.740 uIU/mL Final        POCT Results (if applicable):   Results for orders placed or performed in visit on 03/12/25   POC Glycosylated Hemoglobin (Hb A1C)    Collection Time: 03/12/25  1:16 PM    Specimen: Blood   Result Value Ref Range    Hemoglobin A1C 6.9 (A) 4.5 - 5.7 %    Lot Number [id number]     Expiration Date 11/08/2026    POC Albumin/Creatinine Ratio Urine    Collection Time: 03/12/25  1:17 PM    Specimen: Urine   Result Value Ref Range    POC ALBUMIN, URINE 10 mg/L    POC CREATININE, URINE 50 mg/dL    POC Urine Albumin Creatinine Ratio <30 <30    Lot Number [id number]     Expiration Date 08/09/2026        PHQ-9: PHQ-9 Total Score:       Imaging:   No valid procedures specified.     Measures:   Advanced Care Planning:   Patient does not have an advance directive, declines further assistance.    Smoking Cessation:   Does not smoke    Assessment / Plan      Assessment/Plan:     Otilio reviewed.    Drug screen done 4/5/24  CSA on file.    Has referral to Endocrine in chart Pituitary macroadenoma and Hashimoto's      Update contract today  Uds today.          1. Essential hypertension  This is a chronic problem that is well controlled on current regimen.  Discussed importance of following low-salt diet for blood pressure control.  Continue current regimen.  Norvasc, Losartan, Nifedipine, metoprolol.  Patient to continue to follow with cardiology.    - amLODIPine (NORVASC) 10 MG tablet; Take 1 tablet by mouth Daily.  Dispense: 30 tablet; Refill: 1  - losartan (COZAAR) 100 MG tablet; Take  1 tablet by mouth Daily.  Dispense: 30 tablet; Refill: 2  - NIFEdipine CC (ADALAT CC) 60 MG 24 hr tablet; Take 1 tablet by mouth Daily.  Dispense: 90 tablet; Refill: 0  - metoprolol tartrate (LOPRESSOR) 25 MG tablet; Take 1 tablet by mouth 2 (Two) Times a Day.  Dispense: 60 tablet; Refill: 2    2. Type 2 diabetes mellitus with diabetic neuropathy, without long-term current use of insulin  A1c was 6.9 today on 3-.  Albumin/creatinine ratio was less than 30 today  Patient to continue metformin.  Patient referred to endocrinology.    - POC Glycosylated Hemoglobin (Hb A1C)  - POC Albumin/Creatinine Ratio Urine  - metFORMIN (GLUCOPHAGE) 500 MG tablet; Take 1 tablet by mouth 2 (Two) Times a Day With Meals.  Dispense: 180 tablet; Refill: 1  - Ambulatory Referral to Endocrinology    3. Acquired hypothyroidism  Continue Synthroid at current dosing.  Referral to endocrinology due to autoimmune disease, Hashimoto's.    - levothyroxine (SYNTHROID, LEVOTHROID) 200 MCG tablet; Take 1 tablet by mouth Daily.  Dispense: 90 tablet; Refill: 0  - Ambulatory Referral to Endocrinology    4. Pituitary macroadenoma  Referral to endocrinology due to pituitary macroadenoma.  Will likely need to see endocrinology at  as specialty not available at [facility] per patient report  - Ambulatory Referral to Endocrinology    5. Hashimoto's disease  Continue Synthroid at current dosing.  Referral to rheumatology.  Referral to endocrinology.  - levothyroxine (SYNTHROID, LEVOTHROID) 200 MCG tablet; Take 1 tablet by mouth Daily.  Dispense: 90 tablet; Refill: 0  - Ambulatory Referral to Rheumatology  - Ambulatory Referral to Endocrinology    6. Fibromyalgia  Continue Lyrica at current dosing.  Will get compliance drug screen.  Will refer patient to rheumatology due to autoimmune disease as well as fibromyalgia, history of Lyme disease, polyarthralgia.  - pregabalin (LYRICA) 150 MG capsule; 150 mg in am and 300 mg in evening.  Dispense: 90  capsule; Refill: 2  - Compliance Drug Analysis, Ur - Urine, Clean Catch; Future  - Compliance Drug Analysis, Ur - Urine, Clean Catch  - Ambulatory Referral to Rheumatology    7. Polyarthralgia  Refer to rheumatology.  - Ambulatory Referral to Rheumatology    8. Autoimmune disease  Referred to rheumatology.  - Ambulatory Referral to Rheumatology    9. History of Lyme disease  Referral to rheumatology.  - Ambulatory Referral to Rheumatology    10. Neuropathy  Patient to continue Lyrica at current dosing.  Compliance drug screen today.  - pregabalin (LYRICA) 150 MG capsule; 150 mg in am and 300 mg in evening.  Dispense: 90 capsule; Refill: 2  - Compliance Drug Analysis, Ur - Urine, Clean Catch; Future  - Compliance Drug Analysis, Ur - Urine, Clean Catch    11. History of DVT of lower extremity  Patient to continue Eliquis.  Patient to continue to follow with cardiology.  - Eliquis 5 MG tablet tablet; Take 1 tablet by mouth Every 12 (Twelve) Hours.  Dispense: 60 tablet; Refill: 5    12. Hypertriglyceridemia  Continue colestipol.  - colestipol (COLESTID) 1 g tablet; Take 2 tablets by mouth 2 (Two) Times a Day. Titrate to effectiveness. Do not take within 2 hours of other medications    13. Therapeutic drug monitoring  Compliance drug screen today.  - Compliance Drug Analysis, Ur - Urine, Clean Catch; Future  - Compliance Drug Analysis, Ur - Urine, Clean Catch        Part of this note may be an electronic transcription/translation of spoken language to printed text using the Dragon Dictation System.        43 minutes were spent on this patient encounter which included history taking, physical exam, answering patient questions, counseling, discussing treatment plan, placing orders, and documentation.        Vaccine Counseling:      Follow Up:   Return in about 3 months (around 6/12/2025) for follow FM, htn..      DO TANA Herr

## 2025-03-12 NOTE — PATIENT INSTRUCTIONS
Take medications as ordered.  Low fat, low salt diet.  Exercise as tolerated.  Keep appts with specialists.  Labs today.  PT as per Neurology.

## 2025-03-12 NOTE — PROGRESS NOTES
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 Final    Anion Gap 12/04/2024 11.3  5.0 - 15.0 mmol/L Final    eGFR 12/04/2024 57.4 (L)  >60.0 mL/min/1.73 Final    Hemoglobin A1C 12/04/2024 6.60 (H)  4.80 - 5.60 % Final   Lab on 07/31/2024   Component Date Value Ref Range Status    Total Cholesterol 07/31/2024 161  0 - 200 mg/dL Final    Triglycerides 07/31/2024 185 (H)  0 - 150 mg/dL Final    HDL Cholesterol 07/31/2024 29 (L)  40 - 60 mg/dL Final    LDL Cholesterol  07/31/2024 100  0 - 100 mg/dL Final    VLDL Cholesterol 07/31/2024 32  5 - 40 mg/dL Final    LDL/HDL Ratio 07/31/2024 3.28   Final    Hemoglobin A1C 07/31/2024 6.40 (H)  4.80 - 5.60 % Final   Lab on 07/26/2024   Component Date Value Ref Range Status    Glucose 07/26/2024 122 (H)  65 - 99 mg/dL Final    BUN 07/26/2024 19  6 - 20 mg/dL Final    Creatinine 07/26/2024 1.34 (H)  0.57 - 1.00 mg/dL Final    Sodium 07/26/2024 139  136 - 145 mmol/L Final    Potassium 07/26/2024 4.4  3.5 - 5.2 mmol/L Final    Chloride 07/26/2024 100  98 - 107 mmol/L Final    CO2 07/26/2024 27.5  22.0 - 29.0 mmol/L Final    Calcium 07/26/2024 9.8  8.6 - 10.5 mg/dL Final    Total Protein 07/26/2024 7.6  6.0 - 8.5 g/dL Final    Albumin 07/26/2024 4.3  3.5 - 5.2 g/dL Final    ALT (SGPT) 07/26/2024 13  1 - 33 U/L Final    AST (SGOT) 07/26/2024  16  1 - 32 U/L Final    Alkaline Phosphatase 07/26/2024 121 (H)  39 - 117 U/L Final    Total Bilirubin 07/26/2024 0.4  0.0 - 1.2 mg/dL Final    Globulin 07/26/2024 3.3  gm/dL Final    A/G Ratio 07/26/2024 1.3  g/dL Final    BUN/Creatinine Ratio 07/26/2024 14.2  7.0 - 25.0 Final    Anion Gap 07/26/2024 11.5  5.0 - 15.0 mmol/L Final    eGFR 07/26/2024 47.8 (L)  >60.0 mL/min/1.73 Final   Admission on 04/29/2024, Discharged on 04/29/2024   Component Date Value Ref Range Status    QT Interval 04/29/2024 378  ms Final    QTC Interval 04/29/2024 457  ms Final    QT Interval 04/29/2024 406  ms Final    QTC Interval 04/29/2024 471  ms Final    HS Troponin T 04/29/2024 8  <14 ng/L Final    Glucose 04/29/2024 97  65 - 99 mg/dL Final    BUN 04/29/2024 12  6 - 20 mg/dL Final    Creatinine 04/29/2024 0.84  0.57 - 1.00 mg/dL Final    Sodium 04/29/2024 142  136 - 145 mmol/L Final    Potassium 04/29/2024 3.5  3.5 - 5.2 mmol/L Final    Chloride 04/29/2024 105  98 - 107 mmol/L Final    CO2 04/29/2024 26.0  22.0 - 29.0 mmol/L Final    Calcium 04/29/2024 9.4  8.6 - 10.5 mg/dL Final    Total Protein 04/29/2024 7.6  6.0 - 8.5 g/dL Final    Albumin 04/29/2024 4.1  3.5 - 5.2 g/dL Final    ALT (SGPT) 04/29/2024 24  1 - 33 U/L Final    AST (SGOT) 04/29/2024 23  1 - 32 U/L Final    Alkaline Phosphatase 04/29/2024 105  39 - 117 U/L Final    Total Bilirubin 04/29/2024 0.3  0.0 - 1.2 mg/dL Final    Globulin 04/29/2024 3.5  gm/dL Final    Calculated Result    A/G Ratio 04/29/2024 1.2  g/dL Final    BUN/Creatinine Ratio 04/29/2024 14.3  7.0 - 25.0 Final    Anion Gap 04/29/2024 11.0  5.0 - 15.0 mmol/L Final    eGFR 04/29/2024 83.7  >60.0 mL/min/1.73 Final    Lipase 04/29/2024 24  13 - 60 U/L Final    proBNP 04/29/2024 358.4  0.0 - 900.0 pg/mL Final    Extra Tube 04/29/2024 Hold for add-ons.   Final    Auto resulted.    Extra Tube 04/29/2024 hold for add-on   Final    Auto resulted    Extra Tube 04/29/2024 Hold for add-ons.   Final    Auto resulted.     Extra Tube 04/29/2024 Hold for add-ons.   Final    Auto resulted.    Extra Tube 04/29/2024 Hold for add-ons.   Final    Auto resulted    WBC 04/29/2024 7.61  3.40 - 10.80 10*3/mm3 Final    RBC 04/29/2024 4.73  3.77 - 5.28 10*6/mm3 Final    Hemoglobin 04/29/2024 12.6  12.0 - 15.9 g/dL Final    Hematocrit 04/29/2024 39.6  34.0 - 46.6 % Final    MCV 04/29/2024 83.7  79.0 - 97.0 fL Final    MCH 04/29/2024 26.6  26.6 - 33.0 pg Final    MCHC 04/29/2024 31.8  31.5 - 35.7 g/dL Final    RDW 04/29/2024 14.1  12.3 - 15.4 % Final    RDW-SD 04/29/2024 43.1  37.0 - 54.0 fl Final    MPV 04/29/2024 10.4  6.0 - 12.0 fL Final    Platelets 04/29/2024 309  140 - 450 10*3/mm3 Final    Neutrophil % 04/29/2024 60.4  42.7 - 76.0 % Final    Lymphocyte % 04/29/2024 32.1  19.6 - 45.3 % Final    Monocyte % 04/29/2024 4.9 (L)  5.0 - 12.0 % Final    Eosinophil % 04/29/2024 1.4  0.3 - 6.2 % Final    Basophil % 04/29/2024 0.7  0.0 - 1.5 % Final    Immature Grans % 04/29/2024 0.5  0.0 - 0.5 % Final    Neutrophils, Absolute 04/29/2024 4.60  1.70 - 7.00 10*3/mm3 Final    Lymphocytes, Absolute 04/29/2024 2.44  0.70 - 3.10 10*3/mm3 Final    Monocytes, Absolute 04/29/2024 0.37  0.10 - 0.90 10*3/mm3 Final    Eosinophils, Absolute 04/29/2024 0.11  0.00 - 0.40 10*3/mm3 Final    Basophils, Absolute 04/29/2024 0.05  0.00 - 0.20 10*3/mm3 Final    Immature Grans, Absolute 04/29/2024 0.04  0.00 - 0.05 10*3/mm3 Final    nRBC 04/29/2024 0.0  0.0 - 0.2 /100 WBC Final    HS Troponin T 04/29/2024 <6  <14 ng/L Final    Troponin T Numeric Delta 04/29/2024    Final    Unable to calculate.   Office Visit on 04/05/2024   Component Date Value Ref Range Status    Hemoglobin A1C 04/05/2024 5.8 (A)  4.5 - 5.7 % Final    Lot Number 04/05/2024 [id number]   Final    Expiration Date 04/05/2024 12/17/2025   Final    Microalbumin, Urine 04/05/2024 10   Final    Creatinine, Urine 04/05/2024 50   Final    Lot Number 04/05/2024 [id number]   Final    Expiration Date 04/05/2024  05/09/2025   Final    TSH 04/05/2024 2.370  0.270 - 4.200 uIU/mL Final    Free T4 04/05/2024 1.37  0.93 - 1.70 ng/dL Final    Report Summary 04/05/2024 FINAL   Final    Comment: ====================================================================  [facility]  ====================================================================  Test                             Result       Flag       Units  Drug Present    Acetaminophen                  PRESENT    Ibuprofen                      PRESENT    Diphenhydramine                PRESENT  ====================================================================  Test                      Result    Flag   Units      Ref Range    Creatinine              89               mg/dL      >=20  ====================================================================  Declared Medications:   Medication list was not provided.  ====================================================================  For clinical consultation, please call [phone].  ====================================================================             Assessment & Plan   Problems Addressed this Visit    None  Visit Diagnoses         Major depressive disorder, recurrent episode, moderate  (Chronic)   -  Primary    Relevant Medications    traZODone (DESYREL) 50 MG tablet    Vortioxetine HBr (Trintellix) 5 MG tablet tablet      Anxiety disorder, unspecified type  (Chronic)       Relevant Medications    traZODone (DESYREL) 50 MG tablet    Vortioxetine HBr (Trintellix) 5 MG tablet tablet          Diagnoses         Codes Comments      Major depressive disorder, recurrent episode, moderate    -  Primary ICD-10-CM: F33.1  ICD-9-CM: 296.32       Anxiety disorder, unspecified type     ICD-10-CM: F41.9  ICD-9-CM: 300.00             Visit Diagnoses:    ICD-10-CM ICD-9-CM   1. Major depressive disorder, recurrent episode, moderate  F33.1 296.32   2. Anxiety disorder, unspecified type  F41.9 300.00            GOALS:  Short Term Goals: Patient will be compliant with medication, and patient will have no significant medication related side effects.  Patient will be engaged in psychotherapy as indicated.  Patient will report subjective improvement of symptoms.  Long term goals: To stabilize mood and treat/improve subjective symptoms, the patient will stay out of the hospital, the patient will be at an optimal level of functioning, and the patient will take all medications as prescribed.  The patient verbalized understanding and agreement with goals that were mutually set.      TREATMENT PLAN: Re-start supportive psychotherapy efforts and take medications as indicated.  Medication and treatment options, both pharmacological and non-pharmacological treatment options, discussed during today's visit, including any off label use of medication. Patient acknowledged and verbally consented with current treatment plan and was educated on the importance of compliance with treatment and follow-up appointments.      -Continue Trintellix 5 mg by mouth once daily with food for mood.  -The patient is prescribed Pregabalin by an outside provider.  -The patient is prescribed Trazodone by primary care.  -The patient is scheduled to restart psychotherapy 3/18/2025.    Patient aware of limitations of providers availability and office hours, and how to reach provider/office if needed (office number for patient to call for any questions/concerns: [phone]). It has been discussed with the patient if the patient's needs require more frequent or intensive management/monitoring than can be provided from this provider utilizing a strictly virtual platform, or care that is outside of this provider's scope, then patient may be referred to more appropriate provider or modality. Patient verbalized understanding and agreement in her own words.       MEDICATION ISSUES:  Discussed medication options and treatment plan of prescribed medication,  any off label use of medication, as well as the risks, benefits, any black box warnings including increased suicidality, and side effects including but not limited to potential falls, dizziness, possible impaired driving, GI side effects (change in appetite, abdominal discomfort, nausea, vomiting, diarrhea, and/or constipation), dry mouth, somnolence, sedation, insomnia, activation, agitation, irritation, tremors, abnormal muscle movements or disorders, headache, sweating, possible bruising or rare bleeding, electrolyte and/or fluid abnormalities, change in blood pressure/heart rate/and or heart rhythm, sexual dysfunction, and metabolic adversities among others. Patient and/or guardian agreeable to call the office with any worsening of symptoms or onset of side effects, or if any concerns or questions arise.  The contact information for the office is made available to the patient and/or guardian.  Patient and/or guardian agreeable to call 911 or go to the nearest ER should they begin having any SI/HI, or if any urgent concerns arise.    Due to the nature of virtual visits and inability to monitor vital signs and weight with virtual visits, the patient has been encouraged to monitor their vital signs and weight regularly either through self-monitoring via home device(s) or with their Primary Care Provider, and the patient has been instructed to notify this APRN of any abnormalities or changes from baseline.      VERBAL INFORMED CONSENT FOR MEDICATION:  The patient was educated that their proposed/prescribed psychotropic medication(s) has potential risks, side effects, adverse effects, and black box warnings; and these have been discussed with the patient.  The patient has been informed that their treatment and medication dosage is to be individualized, and may even be above or below the recommended range/dosage due to patient individualization and response, but medication is prescribed using a shared decision making  approach, and no medication or dosage will be prescribed without the patient's verbal consent.  The reason for the use of the medication including any off label use and alternative modes of treatment other than or in addition to medication has been considered and discussed, the probable consequences of not receiving the proposed treatment have been discussed, and any treatment side effects, black box warnings, and cautions associated with treatment have been discussed with the patient.  The patient is allowed ample time to openly discuss and ask questions regarding the proposed medication(s) and treatment plan and the patient verbalizes understanding the reasons for the use of the medication, its potential risks and benefits, other alternative treatment(s), and the probable consequences that may occur if the proposed medication is not given.  The patient has been given ample time to ask questions and study the information and find the information to be specific, accurate, and complete.  The patient gives verbal consent for the medication(s) proposed/prescribed, they verbalized understanding that they can refuse and withdraw consent at any time with the assistance of this APRN, and the patient has verbally confirmed that they are aware, and are willing, to take the prescribed medication and follow the treatment plan with the known possible risks, side effect, black box warnings, and any potential medication interactions, and the patient reports they will be worse off without this medication and treatment plan.  The patient is advised to contact this APRN/this office if any questions or concerns arise at any time (at [phone]), or call 911/go to the closest emergency department if needed or outside of office hours.      SUICIDE RISK ASSESSMENT AND SAFETY PLAN: Unalterable demographics and a history of mental health intervention indicate this patient is in a high risk category compared to the general population. At  present, the patient denies active SI/HI, intentions, or plans at this time and agrees to seek immediate care should such thoughts develop. The patient verbalizes understanding of how to access emergency care if needed and agrees to do so. Consideration of suicide risk and protective factors such as history, current presentation, individual strengths and weaknesses, psychosocial and environmental stressors and variables, psychiatric illness and symptoms, medical conditions and pain, took place in this interview. Based on those considerations, the patient is determined: within individual baseline and presenting no imminent risk for suicide or homicide. Other recommendations: The patient does not meet the criteria for inpatient admission and is not a safety risk to self or others at today's visit. Inpatient treatment offers no significant advantages over outpatient treatment for this patient at today's visit.  The patient was given ample time for questions and fully participated in treatment planning.  The patient was encouraged to call the clinic with any questions or concerns.  The patient was informed of access to emergency care. If patient were to develop any significant symptomatology, suicidal ideation, homicidal ideation, any concerns, or feel unsafe at any time they are to call the clinic and if unable to get immediate assistance should immediately call 911 or go to the nearest emergency room.  Patient contracted verbally for the following: If you are experiencing an emotional crisis or have thoughts of harming yourself or others, please go to your nearest local emergency room or call 911. Will continue to re-assess medication response and side effects frequently to establish efficacy and ensure safety. Risks, any black box warnings, side effects, off label usage, and benefits of medication and treatment discussed with patient, along with potential adverse side effects of current and/or newly prescribed  medication, alternative treatment options, and OTC medications.  Patient verbalized understanding of potential risks, any off label use of medication, any black box warnings, and any side effects in their own words. The patient verbalized understanding and agreed to comply with the safety plan discussed in their own words.  Patient given the number to the office. Number also discussed of the 24- hour suicide hotline.           MEDS ORDERED DURING VISIT:  New Medications Ordered This Visit   Medications    Vortioxetine HBr (Trintellix) 5 MG tablet tablet     Sig: Take 1 tablet by mouth Daily With Breakfast.     Dispense:  30 tablet     Refill:  0       Return in about 4 weeks (around 4/9/2025), or if symptoms worsen or fail to improve, for Next scheduled follow up and Recheck.         Progress towards goal: Not at goal    Functional Status: Moderate impairment     Prognosis: Good with Ongoing Treatment             This document has been electronically signed by BONITA Raza  March 12, 2025 12:01 EDT    Some of the data in this electronic note has been brought forward from a previous encounter, any necessary changes have been made, it has been reviewed by this APRN, and it is accurate.    Please note that portions of this note were completed with a voice recognition program.

## 2025-03-17 LAB
IGA SERPL-MCNC: 396 MG/DL (ref 87–352)
IGG SERPL-MCNC: 1114 MG/DL (ref 586–1602)
IGM SERPL-MCNC: 84 MG/DL (ref 26–217)
PROT PATTERN SERPL IFE-IMP: ABNORMAL

## 2025-03-18 ENCOUNTER — TELEMEDICINE (OUTPATIENT)
Dept: PSYCHIATRY | Facility: CLINIC | Age: 55
End: 2025-03-18
Payer: MEDICAID

## 2025-03-18 ENCOUNTER — TELEPHONE (OUTPATIENT)
Dept: PSYCHIATRY | Facility: CLINIC | Age: 55
End: 2025-03-18

## 2025-03-18 DIAGNOSIS — F33.1 MAJOR DEPRESSIVE DISORDER, RECURRENT EPISODE, MODERATE: Primary | ICD-10-CM

## 2025-03-18 DIAGNOSIS — F41.9 ANXIETY DISORDER, UNSPECIFIED TYPE: ICD-10-CM

## 2025-03-18 DIAGNOSIS — F43.10 POST TRAUMATIC STRESS DISORDER (PTSD): ICD-10-CM

## 2025-03-18 NOTE — PROGRESS NOTES
Date: March 19, 2025  Time In: 1:59  Time out: 3:05      This provider is located at the [facility] (through [facility]), [address], [place] [address] using a secure [employer] Video Visit through [facility]. Patient is being seen remotely via telehealth, and they are in a secure environment for this session. The patient's condition being diagnosed/treated is appropriate for telemedicine. The provider identified herself as well as her credentials. The patient, and/or patients guardian, consent to be seen remotely, and when consent is given they understand that the consent allows for patient identifiable information to be sent to a third party as needed. They may refuse to be seen remotely at any time. The electronic data is encrypted and password protected, and the patient and/or guardian has been advised of the potential risks to privacy not withstanding such measures.     Mode of Visit: Video  Location of patient:   Location of provider: Provider home  You have chosen to receive care through a telehealth visit.  The patient has signed the video visit consent form.  The visit included audio and video interaction. No technical issues occurred during this visit    Subjective   Jazmín Hicks is a 54 y.o. female who presents today fully oriented, appropriately dressed and groomed, and open to communication for follow up appointment.    Chief Complaint:   Chief Complaint   Patient presents with    Anxiety    Depression    Pain    PTSD        History of Present Illness: Rapport was established through conversation and unconditional positive regard. Recent events were discussed and how these events impact Pt's emotional health.   Pt reports successful use of learned coping skills since last report.  t rates anxiety at 7/10 and depression at 9/10.     Sleep: Pt reports sleep has remained unchanged since last report. Healthy sleep habits were discussed such as maintaining  "a schedule, routine, avoiding caffeine, and limiting screen time before bed.  \"I sleep but fitfully\"  The new medication is helping with sleep but Pt is still having to take antihistamine to get to sleep.       Appetite:  Pt reports appetite has been poor since last report.  Discussed the importance of hydration and eating a healthy diet for overall and mental health.  \"I am definitely emotionally eating\"      Medication compliance: Pt reports medications are being taken daily as prescribed. Discussed the importance of compliance with prescriber's directions and Pt was instructed to report questions/concerns, as well as missed doses or discontinuation of medication by Pt.     Safety Plan in Place: No Pt denies SI/HI/SIB recent or current    Daily Functioning:  Since last report, Pt states symptoms are causing Severe difficulty in daily functioning.      Content Discussion:   Pt reports life updates since previous session. Pt identified current stressors. Pt acknowledged stressors within and outside of one's control. Pt identified successes and issues with utilizing coping mechanisms.  Pt states she has a lot of health issues and Pt states it is all from taking Covid booster in 2020.  After her first booster February 2021 \"It almost killed me\"  Pt states for 18 months she had seizures 2-x3 week.  Pt also began having heart beat issues and eventually had a pace maker.  Pt wore a halter monitor and was brought in in the middle of the monitoring test and had an emergency pacemaker in May 2023.  Pt states the seizures stopped at that time.  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  Pt states her health was getting in the way and she was let go a few weeks ago.  Pt states her doctors are encouraging pt to " "apply for disability.  Pt states her cousin who Pt lives with has been helping financially and \"she does take care of me.\"  Pt and cousin and cousin's 14 yo son live in the home. Pt states walking is very difficult for her, and much of the time she is using assistance to walk.  She is working on getting disability benefits and she has filed.  Discussed the need to accept help from her support system without feeling guilt or shame.  Encouraged Pt to simply accept the help and say thank you when her cousin or nephew need to assist her physically, socially, or financially.  Discussed the importance of recognizing them as her \"village\"    Counselor supported Pt in continued exploration of underlying belief systems which contribute to difficulty in connecting/vulnerability.  Through discussion, Pt identifies themes of preservation and overt emotional and physical reactivity when physical and/or emotional statis is in question, even in low or perceived threatening situations. Counselor supported Pt in identifying past experiences and underlying beliefs which contribute to these feelings and reactions.  Pt was supported and encouraged in processing emotions related to frustrations with the expectations of self and others.  Pt was encouraged to identify cultural and nuclear familial contexts which contribute to these concerns.  Pt was receptive and engaged in conversation, and Pt reports this was beneficial and applicable to their situation.      Reviewed coping skills and encouraged Pt to continue to practicing coping skills daily when not under distress. Pt was praised for their attempts to decrease symptoms and mitigate activating events.    Clinical Maneuvering/Intervention:  CBT was utilized to encourage Pt to identify maladaptive thoughts and behaviors and replace with more affirming and positive.Pt encouraged to set and maintain appropriate and healthy boundaries with others. Pt was instructed to practice daily using " appropriate and specific words to communicate feelings to others.  Motivational interviewing used to encourage Pt to identify strengths which can be utilized in working toward treatment goals. Pt encouraged to practice daily learned skills such as controlled breathing, grounding, and mindfulness. Pt was encouraged to ask for help from support persons to assist them in maintaining stability and alleviate symptoms. Discussed the importance of being one's own mental health advocate and to refrain from seeing the need to ask for help as a weakness. Pt was encouraged to formulate a plan of action to be more proactive in managing stressors and refrain from using reactive and automatic heightened emotional responses.     Solution-focused therapy employed to identify how Pt would like their life to be if they were to make positive changes. Pt encouraged to identify effective coping skills and strengths they can use to continue utilizing those skills. Pt encouraged to discontinue utilizing non-effective coping mechanisms. Pt provided with feedback to highlight achievements and personal and other resources. Encouraged use of SMART goals    Assisted patient in processing above session content; acknowledged and normalized patient’s thoughts, feelings, and concerns.  Rationalized patient thought process regarding concerns presented at session.  Discussed triggers associated with patient's  anxiety , depression , and PTSD Also discussed coping skills for patient to implement such as grounding , self care , and positive self talk     Allowed patient to freely discuss issues without interruption or judgment. Provided safe, confidential environment to facilitate the development of positive therapeutic relationship and encourage open, honest communication. Assisted patient in identifying risk factors which would indicate the need for higher level of care including thoughts to harm self or others and/or self-harming behavior and  encouraged patient to contact this office, call 911, or present to the nearest emergency room should any of these events occur. Discussed crisis intervention services and means to access. Patient adamantly and convincingly denies current suicidal or homicidal ideation or perceptual disturbance.    Assessment:     Patient appears to maintain relative stability as compared to their baseline.  However, patient persistently struggles with symptoms which continue to cause impairment in important areas of functioning.  As a result, they can be reasonably expected to continue to benefit from treatment and would likely be at increased risk for decompensation otherwise.      PHQ-Score Total:  PHQ-9 Total Score: PHQ-9 Depression Screening  Little interest or pleasure in doing things?  3   Feeling down, depressed, or hopeless?  3   PHQ-2 Total Score     Trouble falling or staying asleep, or sleeping too much?  2   Feeling tired or having little energy?  3   Poor appetite or overeating?  3   Feeling bad about yourself - or that you are a failure or have let yourself or your family down?  3   Trouble concentrating on things, such as reading the newspaper or watching television?  2   Moving or speaking so slowly that other people could have noticed? Or the opposite - being so fidgety or restless that you have been moving around a lot more than usual?  3   Thoughts that you would be better off dead, or of hurting yourself in some way?  0   PHQ-9 Total Score  22   If you checked off any problems, how difficult have these problems made it for you to do your work, take care of things at home, or get along with other people? Extreme        States [facility] Stark, have made an agreement for safety, an Pt feels this is adequate for her safety.  Pt states she would never harm herself in any way.        Over the last two weeks, how often have you been bothered by the following problems?  Feeling nervous, anxious or on edge: Nearly  every day  Not being able to stop or control worrying: Nearly every day  Worrying too much about different things: Nearly every day  Trouble Relaxing: Nearly every day  Being so restless that it is hard to sit still: Several days  Becoming easily annoyed or irritable: Nearly every day  Feeling afraid as if something awful might happen: More than half the days  ANIBAL 7 Total Score: 18  If you checked any problems, how difficult have these problems made it for you to do your work, take care of things at home, or get along with other people: Very difficult      Mental Status Exam:   Hygiene:   good  Cooperation:  Cooperative  Eye Contact:  Good  Psychomotor Behavior:  normal  Affect:  Blunted  and tearful at times  Mood: depressed and anxious  Speech:  Normal  Thought Process:  Goal directed  Thought Content:  Normal  Suicidal:  None  Homicidal: None  Hallucinations:  None  Delusion: None  Memory:  Intact  Orientation:  Grossly Intact  Reliability:  good  Insight:  Good  Judgement:  Good  Impulse Control:  Good  Physical/Medical Issues:  Chronic health issues and pain      Functional Status: Severe impairment    Progress toward goal: Not at goal    Prognosis: Good with continued therapeutic intervention        Plan:    Patient will continue in individual outpatient therapy with focus on improved functioning and coping skills, maintaining stability, and avoiding decompensation and the need for higher level of care.    Patient will adhere to medication regimen as prescribed and report any side effects. Patient will contact this office, call 911 or present to the nearest emergency room should suicidal or homicidal ideations occur. Provide Cognitive Behavioral Therapy and Solution Focused Therapy to improve functioning, maintain stability, and avoid decompensation and the need for higher level of care.     Return in about 2 weeks (around 4/1/2025).      VISIT DIAGNOSIS:    Diagnosis Plan   1. Major depressive disorder,  recurrent episode, moderate        2. Post traumatic stress disorder (PTSD)        3. Anxiety disorder, unspecified type         13:59 EDT       This document has been electronically signed by GREGORY Adrian  March 19, 2025      Part of this note may be an electronic transcription/translation of spoken language to printed text using the Dragon Dictation System.

## 2025-03-18 NOTE — TELEPHONE ENCOUNTER
Patient LVM stating she was calling to schedule follow up appointments with [facility]. Tried calling the patient back, JAMILA and sent [employer] message to call the office.

## 2025-03-19 LAB — DRUGS UR: NORMAL

## 2025-04-10 ENCOUNTER — TELEMEDICINE (OUTPATIENT)
Dept: PSYCHIATRY | Facility: CLINIC | Age: 55
End: 2025-04-10
Payer: COMMERCIAL

## 2025-04-10 DIAGNOSIS — G47.00 INSOMNIA, UNSPECIFIED TYPE: ICD-10-CM

## 2025-04-10 DIAGNOSIS — F41.9 ANXIETY DISORDER, UNSPECIFIED TYPE: Chronic | ICD-10-CM

## 2025-04-10 DIAGNOSIS — F33.1 MAJOR DEPRESSIVE DISORDER, RECURRENT EPISODE, MODERATE: Primary | Chronic | ICD-10-CM

## 2025-04-10 RX ORDER — TRAZODONE HYDROCHLORIDE 100 MG/1
100 TABLET ORAL NIGHTLY PRN
Qty: 30 TABLET | Refills: 0 | Status: SHIPPED | OUTPATIENT
Start: 2025-04-10

## 2025-04-10 NOTE — PROGRESS NOTES
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 01/31/2025 139  136 - 145 mmol/L Final    Potassium 01/31/2025 3.8  3.5 - 5.2 mmol/L Final    Slight hemolysis detected by analyzer. Result may be falsely elevated.    Chloride 01/31/2025 102  98 - 107 mmol/L Final    CO2 01/31/2025 24.0  22.0 - 29.0 mmol/L Final    Calcium 01/31/2025 9.3  8.6 - 10.5 mg/dL Final    BUN/Creatinine Ratio 01/31/2025 13.1  7.0 - 25.0 Final    Anion Gap 01/31/2025 13.0  5.0 - 15.0 mmol/L Final    eGFR 01/31/2025 83.7  >60.0 mL/min/1.73 Final    TSH 01/31/2025 51.690 (H)  0.270 - 4.200 uIU/mL Final    Free T4 01/31/2025 0.59 (L)  0.92 - 1.68 ng/dL Final    Magnesium 01/31/2025 2.2  1.6 - 2.6 mg/dL Final    QT Interval 01/31/2025 436  ms Final    QTC Interval 01/31/2025 497  ms Final    WBC 01/31/2025 6.55  3.40 - 10.80 10*3/mm3 Final    RBC 01/31/2025 5.03  3.77 - 5.28 10*6/mm3 Final    Hemoglobin 01/31/2025 13.0  12.0 - 15.9 g/dL Final    Hematocrit 01/31/2025 42.5  34.0 - 46.6 % Final    MCV 01/31/2025 84.5  79.0 - 97.0 fL Final    MCH 01/31/2025 25.8 (L)  26.6 - 33.0 pg Final    MCHC 01/31/2025 30.6 (L)  31.5 - 35.7 g/dL Final    RDW 01/31/2025 17.2 (H)  12.3 - 15.4 % Final     RDW-SD 01/31/2025 52.6  37.0 - 54.0 fl Final    MPV 01/31/2025 10.9  6.0 - 12.0 fL Final    Platelets 01/31/2025 270  140 - 450 10*3/mm3 Final    Neutrophil % 01/31/2025 51.1  42.7 - 76.0 % Final    Lymphocyte % 01/31/2025 42.6  19.6 - 45.3 % Final    Monocyte % 01/31/2025 3.7 (L)  5.0 - 12.0 % Final    Eosinophil % 01/31/2025 1.2  0.3 - 6.2 % Final    Basophil % 01/31/2025 0.9  0.0 - 1.5 % Final    Immature Grans % 01/31/2025 0.5  0.0 - 0.5 % Final    Neutrophils, Absolute 01/31/2025 3.35  1.70 - 7.00 10*3/mm3 Final    Lymphocytes, Absolute 01/31/2025 2.79  0.70 - 3.10 10*3/mm3 Final    Monocytes, Absolute 01/31/2025 0.24  0.10 - 0.90 10*3/mm3 Final    Eosinophils, Absolute 01/31/2025 0.08  0.00 - 0.40 10*3/mm3 Final    Basophils, Absolute 01/31/2025 0.06  0.00 - 0.20 10*3/mm3 Final    Immature Grans, Absolute 01/31/2025 0.03  0.00 - 0.05 10*3/mm3 Final    nRBC 01/31/2025 0.0  0.0 - 0.2 /100 WBC Final   Lab on 12/04/2024   Component Date Value Ref Range Status    TSH 12/04/2024 50.900 (H)  0.270 - 4.200 uIU/mL Final    Free T4 12/04/2024 0.66 (L)  0.92 - 1.68 ng/dL Final    Magnesium 12/04/2024 2.0  1.6 - 2.6 mg/dL Final    Glucose 12/04/2024 103 (H)  65 - 99 mg/dL Final    BUN 12/04/2024 15  6 - 20 mg/dL Final    Creatinine 12/04/2024 1.15 (H)  0.57 - 1.00 mg/dL Final    Sodium 12/04/2024 138  136 - 145 mmol/L Final    Potassium 12/04/2024 4.3  3.5 - 5.2 mmol/L Final    Chloride 12/04/2024 105  98 - 107 mmol/L Final    CO2 12/04/2024 21.7 (L)  22.0 - 29.0 mmol/L Final    Calcium 12/04/2024 9.2  8.6 - 10.5 mg/dL Final    BUN/Creatinine Ratio 12/04/2024 13.0  7.0 - 25.0 Final    Anion Gap 12/04/2024 11.3  5.0 - 15.0 mmol/L Final    eGFR 12/04/2024 57.4 (L)  >60.0 mL/min/1.73 Final    Hemoglobin A1C 12/04/2024 6.60 (H)  4.80 - 5.60 % Final   Lab on 07/31/2024   Component Date Value Ref Range Status    Total Cholesterol 07/31/2024 161  0 - 200 mg/dL Final    Triglycerides 07/31/2024 185 (H)  0 - 150 mg/dL Final    HDL  Cholesterol 07/31/2024 29 (L)  40 - 60 mg/dL Final    LDL Cholesterol  07/31/2024 100  0 - 100 mg/dL Final    VLDL Cholesterol 07/31/2024 32  5 - 40 mg/dL Final    LDL/HDL Ratio 07/31/2024 3.28   Final    Hemoglobin A1C 07/31/2024 6.40 (H)  4.80 - 5.60 % Final   Lab on 07/26/2024   Component Date Value Ref Range Status    Glucose 07/26/2024 122 (H)  65 - 99 mg/dL Final    BUN 07/26/2024 19  6 - 20 mg/dL Final    Creatinine 07/26/2024 1.34 (H)  0.57 - 1.00 mg/dL Final    Sodium 07/26/2024 139  136 - 145 mmol/L Final    Potassium 07/26/2024 4.4  3.5 - 5.2 mmol/L Final    Chloride 07/26/2024 100  98 - 107 mmol/L Final    CO2 07/26/2024 27.5  22.0 - 29.0 mmol/L Final    Calcium 07/26/2024 9.8  8.6 - 10.5 mg/dL Final    Total Protein 07/26/2024 7.6  6.0 - 8.5 g/dL Final    Albumin 07/26/2024 4.3  3.5 - 5.2 g/dL Final    ALT (SGPT) 07/26/2024 13  1 - 33 U/L Final    AST (SGOT) 07/26/2024 16  1 - 32 U/L Final    Alkaline Phosphatase 07/26/2024 121 (H)  39 - 117 U/L Final    Total Bilirubin 07/26/2024 0.4  0.0 - 1.2 mg/dL Final    Globulin 07/26/2024 3.3  gm/dL Final    A/G Ratio 07/26/2024 1.3  g/dL Final    BUN/Creatinine Ratio 07/26/2024 14.2  7.0 - 25.0 Final    Anion Gap 07/26/2024 11.5  5.0 - 15.0 mmol/L Final    eGFR 07/26/2024 47.8 (L)  >60.0 mL/min/1.73 Final   Admission on 04/29/2024, Discharged on 04/29/2024   Component Date Value Ref Range Status    QT Interval 04/29/2024 378  ms Final    QTC Interval 04/29/2024 457  ms Final    QT Interval 04/29/2024 406  ms Final    QTC Interval 04/29/2024 471  ms Final    HS Troponin T 04/29/2024 8  <14 ng/L Final    Glucose 04/29/2024 97  65 - 99 mg/dL Final    BUN 04/29/2024 12  6 - 20 mg/dL Final    Creatinine 04/29/2024 0.84  0.57 - 1.00 mg/dL Final    Sodium 04/29/2024 142  136 - 145 mmol/L Final    Potassium 04/29/2024 3.5  3.5 - 5.2 mmol/L Final    Chloride 04/29/2024 105  98 - 107 mmol/L Final    CO2 04/29/2024 26.0  22.0 - 29.0 mmol/L Final    Calcium 04/29/2024 9.4   8.6 - 10.5 mg/dL Final    Total Protein 04/29/2024 7.6  6.0 - 8.5 g/dL Final    Albumin 04/29/2024 4.1  3.5 - 5.2 g/dL Final    ALT (SGPT) 04/29/2024 24  1 - 33 U/L Final    AST (SGOT) 04/29/2024 23  1 - 32 U/L Final    Alkaline Phosphatase 04/29/2024 105  39 - 117 U/L Final    Total Bilirubin 04/29/2024 0.3  0.0 - 1.2 mg/dL Final    Globulin 04/29/2024 3.5  gm/dL Final    Calculated Result    A/G Ratio 04/29/2024 1.2  g/dL Final    BUN/Creatinine Ratio 04/29/2024 14.3  7.0 - 25.0 Final    Anion Gap 04/29/2024 11.0  5.0 - 15.0 mmol/L Final    eGFR 04/29/2024 83.7  >60.0 mL/min/1.73 Final    Lipase 04/29/2024 24  13 - 60 U/L Final    proBNP 04/29/2024 358.4  0.0 - 900.0 pg/mL Final    Extra Tube 04/29/2024 Hold for add-ons.   Final    Auto resulted.    Extra Tube 04/29/2024 hold for add-on   Final    Auto resulted    Extra Tube 04/29/2024 Hold for add-ons.   Final    Auto resulted.    Extra Tube 04/29/2024 Hold for add-ons.   Final    Auto resulted.    Extra Tube 04/29/2024 Hold for add-ons.   Final    Auto resulted    WBC 04/29/2024 7.61  3.40 - 10.80 10*3/mm3 Final    RBC 04/29/2024 4.73  3.77 - 5.28 10*6/mm3 Final    Hemoglobin 04/29/2024 12.6  12.0 - 15.9 g/dL Final    Hematocrit 04/29/2024 39.6  34.0 - 46.6 % Final    MCV 04/29/2024 83.7  79.0 - 97.0 fL Final    MCH 04/29/2024 26.6  26.6 - 33.0 pg Final    MCHC 04/29/2024 31.8  31.5 - 35.7 g/dL Final    RDW 04/29/2024 14.1  12.3 - 15.4 % Final    RDW-SD 04/29/2024 43.1  37.0 - 54.0 fl Final    MPV 04/29/2024 10.4  6.0 - 12.0 fL Final    Platelets 04/29/2024 309  140 - 450 10*3/mm3 Final    Neutrophil % 04/29/2024 60.4  42.7 - 76.0 % Final    Lymphocyte % 04/29/2024 32.1  19.6 - 45.3 % Final    Monocyte % 04/29/2024 4.9 (L)  5.0 - 12.0 % Final    Eosinophil % 04/29/2024 1.4  0.3 - 6.2 % Final    Basophil % 04/29/2024 0.7  0.0 - 1.5 % Final    Immature Grans % 04/29/2024 0.5  0.0 - 0.5 % Final    Neutrophils, Absolute 04/29/2024 4.60  1.70 - 7.00 10*3/mm3 Final     Lymphocytes, Absolute 04/29/2024 2.44  0.70 - 3.10 10*3/mm3 Final    Monocytes, Absolute 04/29/2024 0.37  0.10 - 0.90 10*3/mm3 Final    Eosinophils, Absolute 04/29/2024 0.11  0.00 - 0.40 10*3/mm3 Final    Basophils, Absolute 04/29/2024 0.05  0.00 - 0.20 10*3/mm3 Final    Immature Grans, Absolute 04/29/2024 0.04  0.00 - 0.05 10*3/mm3 Final    nRBC 04/29/2024 0.0  0.0 - 0.2 /100 WBC Final    HS Troponin T 04/29/2024 <6  <14 ng/L Final    Troponin T Numeric Delta 04/29/2024    Final    Unable to calculate.           Future Appointments         Provider Department Center    4/29/2025 12:00 PM Jerri Cherry [facility]    5/7/2025 9:30 AM Anatoly Dyer APRTEZ [facility]    5/8/2025 9:00 AM Bridgette Vasquez APRN [facility]    5/14/2025 9:00 AM Jerri Cherry [facility]    5/28/2025 8:00 AM Jerri Cherry [facility]    6/10/2025 9:00 AM Jerri Cherry [facility]    6/16/2025 1:30 PM Joseph Fitch DO [facility]    8/14/2025 11:00 AM Clarence Machado MD [facility]UMAT[facility]                  Assessment & Plan   Problems Addressed this Visit    None  Visit Diagnoses         Major depressive disorder, recurrent episode, moderate  (Chronic)   -  Primary    Relevant Medications    Vortioxetine HBr (Trintellix) 5 MG tablet tablet    traZODone (DESYREL) 100 MG tablet      Anxiety disorder, unspecified type  (Chronic)       Relevant Medications    Vortioxetine HBr (Trintellix) 5 MG tablet tablet    traZODone (DESYREL) 100 MG tablet      Insomnia, unspecified type        Relevant Medications    traZODone (DESYREL) 100 MG tablet          Diagnoses         Codes Comments      Major depressive disorder,  recurrent episode, moderate    -  Primary ICD-10-CM: F33.1  ICD-9-CM: 296.32       Anxiety disorder, unspecified type     ICD-10-CM: F41.9  ICD-9-CM: 300.00       Insomnia, unspecified type     ICD-10-CM: G47.00  ICD-9-CM: 780.52             Visit Diagnoses:    ICD-10-CM ICD-9-CM   1. Major depressive disorder, recurrent episode, moderate  F33.1 296.32   2. Anxiety disorder, unspecified type  F41.9 300.00   3. Insomnia, unspecified type  G47.00 780.52           GOALS:  Short Term Goals: Patient will be compliant with medication, and patient will have no significant medication related side effects.  Patient will be engaged in psychotherapy as indicated.  Patient will report subjective improvement of symptoms.  Long term goals: To stabilize mood and treat/improve subjective symptoms, the patient will stay out of the hospital, the patient will be at an optimal level of functioning, and the patient will take all medications as prescribed.  The patient verbalized understanding and agreement with goals that were mutually set.      TREATMENT PLAN: Continue supportive psychotherapy efforts and take medications as indicated.  Medication and treatment options, both pharmacological and non-pharmacological treatment options, discussed during today's visit, including any off label use of medication. Patient acknowledged and verbally consented with current treatment plan and was educated on the importance of compliance with treatment and follow-up appointments.      -Continue Trintellix 5 mg by mouth once daily with food for mood.  -Continue trazodone, but increase trazodone to 100 mg by mouth once nightly as needed for sleeping difficulties.  -The patient is prescribed Pregabalin by an outside provider.      MEDICATION ISSUES:  Discussed medication options and treatment plan of prescribed medication, any off label use of medication, as well as the risks, benefits, any black box warnings including increased suicidality, and side  effects including but not limited to potential falls, dizziness, possible impaired driving, GI side effects (change in appetite, abdominal discomfort, nausea, vomiting, diarrhea, and/or constipation), dry mouth, somnolence, sedation, insomnia, activation, agitation, irritation, tremors, abnormal muscle movements or disorders, headache, sweating, possible bruising or rare bleeding, electrolyte and/or fluid abnormalities, change in blood pressure/heart rate/and or heart rhythm, sexual dysfunction, and metabolic adversities among others. Patient and/or guardian agreeable to call the office with any worsening of symptoms or onset of side effects, or if any concerns or questions arise.  The contact information for the office is made available to the patient and/or guardian.  Patient and/or guardian agreeable to call 911 or go to the nearest ER should they begin having any SI/HI, or if any urgent concerns arise.    Due to the nature of virtual visits and inability to monitor vital signs and weight with virtual visits, the patient has been encouraged to monitor their vital signs and weight regularly either through self-monitoring via home device(s) or with their Primary Care Provider, and the patient has been instructed to notify this APRN of any abnormalities or changes from baseline.    Patient aware of limitations of provider's availability and office hours, and how to reach provider/office if needed (office number for patient to call for any questions/concerns: [phone]). If the patient's needs require more frequent or intensive management/monitoring than can be provided from this provider utilizing a strictly virtual platform, or care that is outside of this provider's scope, then patient may be referred to more appropriate provider or modality.      VERBAL INFORMED CONSENT FOR MEDICATION:  The patient was educated that their proposed/prescribed psychotropic medication(s) has potential risks, side effects, adverse  effects, and black box warnings; and these have been discussed with the patient.  The patient has been informed that their treatment and medication dosage is to be individualized, and may even be above or below the recommended range/dosage due to patient individualization and response, but medication is prescribed using a shared decision making approach, and no medication or dosage will be prescribed without the patient's verbal consent.  The reason for the use of the medication including any off label use and alternative modes of treatment other than or in addition to medication has been considered and discussed, the probable consequences of not receiving the proposed treatment have been discussed, and any treatment side effects, black box warnings, and cautions associated with treatment have been discussed with the patient.  The patient is allowed ample time to openly discuss and ask questions regarding the proposed medication(s) and treatment plan and the patient verbalizes understanding the reasons for the use of the medication, its potential risks and benefits, other alternative treatment(s), and the probable consequences that may occur if the proposed medication is not given.  The patient has been given ample time to ask questions and study the information and find the information to be specific, accurate, and complete.  The patient gives verbal consent for the medication(s) proposed/prescribed, they verbalized understanding that they can refuse and withdraw consent at any time with the assistance of this APRN, and the patient has verbally confirmed that they are aware, and are willing, to take the prescribed medication and follow the treatment plan with the known possible risks, side effect, black box warnings, and any potential medication interactions, and the patient reports they will be worse off without this medication and treatment plan.  The patient is advised to contact this APRN/this office if any  questions or concerns arise at any time (at [phone]), or call 911/go to the closest emergency department if needed or outside of office hours.      SUICIDE RISK ASSESSMENT AND SAFETY PLAN: Unalterable demographics and a history of mental health intervention indicate this patient is in a high risk category compared to the general population. At present, the patient denies active SI/HI, intentions, or plans at this time and agrees to seek immediate care should such thoughts develop. The patient verbalizes understanding of how to access emergency care if needed and agrees to do so. Consideration of suicide risk and protective factors such as history, current presentation, individual strengths and weaknesses, psychosocial and environmental stressors and variables, psychiatric illness and symptoms, medical conditions and pain, took place in this interview. Based on those considerations, the patient is determined: within individual baseline and presenting no imminent risk for suicide or homicide. Other recommendations: The patient does not meet the criteria for inpatient admission and is not a safety risk to self or others at today's visit. Inpatient treatment offers no significant advantages over outpatient treatment for this patient at today's visit.  The patient was given ample time for questions and fully participated in treatment planning.  The patient was encouraged to call the clinic with any questions or concerns.  The patient was informed of access to emergency care. If patient were to develop any significant symptomatology, suicidal ideation, homicidal ideation, any concerns, or feel unsafe at any time they are to call the clinic and if unable to get immediate assistance should immediately call 911 or go to the nearest emergency room.  Patient contracted verbally for the following: If you are experiencing an emotional crisis or have thoughts of harming yourself or others, please go to your nearest local  emergency room or call 911. Will continue to re-assess medication response and side effects frequently to establish efficacy and ensure safety. Risks, any black box warnings, side effects, off label usage, and benefits of medication and treatment discussed with patient, along with potential adverse side effects of current and/or newly prescribed medication, alternative treatment options, and OTC medications.  Patient verbalized understanding of potential risks, any off label use of medication, any black box warnings, and any side effects in their own words. The patient verbalized understanding and agreed to comply with the safety plan discussed in their own words.  Patient given the number to the office. Number also discussed of the 24- hour suicide hotline.           MEDS ORDERED DURING VISIT:  New Medications Ordered This Visit   Medications    Vortioxetine HBr (Trintellix) 5 MG tablet tablet     Sig: Take 1 tablet by mouth Daily With Breakfast.     Dispense:  30 tablet     Refill:  0    traZODone (DESYREL) 100 MG tablet     Sig: Take 1 tablet by mouth At Night As Needed for Sleep.     Dispense:  30 tablet     Refill:  0       Return in about 4 weeks (around 5/8/2025), or if symptoms worsen or fail to improve, for Next scheduled follow up and Recheck.         Progress towards goal: Not at goal    Functional Status: Moderate impairment     Prognosis: Good with Ongoing Treatment             This document has been electronically signed by BONITA Raza  April 10, 2025 10:00 EDT    Some of the data in this electronic note has been brought forward from a previous encounter, any necessary changes have been made, it has been reviewed by this APRN, and it is accurate.    Please note that portions of this note were completed with a voice recognition program.

## 2025-04-23 DIAGNOSIS — I50.9 CHRONIC HEART FAILURE, UNSPECIFIED HEART FAILURE TYPE: ICD-10-CM

## 2025-04-23 DIAGNOSIS — R60.0 PERIPHERAL EDEMA: Primary | ICD-10-CM

## 2025-04-23 RX ORDER — HYDROCHLOROTHIAZIDE 25 MG/1
25 TABLET ORAL DAILY
Qty: 30 TABLET | Refills: 1 | Status: SHIPPED | OUTPATIENT
Start: 2025-04-23

## 2025-04-23 RX ORDER — SPIRONOLACTONE 25 MG/1
25 TABLET ORAL DAILY
Qty: 30 TABLET | Refills: 2 | OUTPATIENT
Start: 2025-04-23

## 2025-04-29 ENCOUNTER — TELEMEDICINE (OUTPATIENT)
Dept: PSYCHIATRY | Facility: CLINIC | Age: 55
End: 2025-04-29
Payer: MEDICAID

## 2025-04-29 DIAGNOSIS — G47.9 SLEEPING DIFFICULTIES: ICD-10-CM

## 2025-04-29 DIAGNOSIS — F33.1 MAJOR DEPRESSIVE DISORDER, RECURRENT EPISODE, MODERATE: Primary | ICD-10-CM

## 2025-04-29 DIAGNOSIS — F43.10 POST TRAUMATIC STRESS DISORDER (PTSD): ICD-10-CM

## 2025-04-29 DIAGNOSIS — F41.9 ANXIETY DISORDER, UNSPECIFIED TYPE: ICD-10-CM

## 2025-04-29 PROCEDURE — 90837 PSYTX W PT 60 MINUTES: CPT | Performed by: COUNSELOR

## 2025-04-29 NOTE — PROGRESS NOTES
Date: April 29, 2025  Time In: 12:14  Time out: 1:14      This provider is located at the [facility]), [address], [place] [address] using a secure Viewfinityt Video Visit through [employer]. Patient is being seen remotely via telehealth, and they are in a secure environment for this session. The patient's condition being diagnosed/treated is appropriate for telemedicine. The provider identified herself as well as her credentials. The patient, and/or patients guardian, consent to be seen remotely, and when consent is given they understand that the consent allows for patient identifiable information to be sent to a third party as needed. They may refuse to be seen remotely at any time. The electronic data is encrypted and password protected, and the patient and/or guardian has been advised of the potential risks to privacy not withstanding such measures.     Mode of Visit: Video  Location of patient: home  Location of provider: Provider home  You have chosen to receive care through a telehealth visit.  The patient has signed the video visit consent form.  The visit included audio and video interaction. No technical issues occurred during this visit    Subjective   Jazmín Hicks is a 55 y.o. female who presents today fully oriented, appropriately dressed and groomed, and open to communication for follow up appointment.    Chief Complaint:   Chief Complaint   Patient presents with    Anxiety    Depression    Sleeping Problem    PTSD        History of Present Illness: Rapport was established through conversation and unconditional positive regard. Recent events were discussed and how these events impact Pt's emotional health.   Pt reports successful use of learned coping skills since last report.  Pt reports continuation of symptoms and states the intensity and duration of symptoms has improved since last report. Pt rates anxiety at 5/10 and depression  "at 5/10.     Sleep: Pt reports sleep has improved since last report. Healthy sleep habits were discussed such as maintaining a schedule, routine, avoiding caffeine, and limiting screen time before bed.    Appetite:  Pt reports appetite has been good since last report.  Discussed the importance of hydration and eating a healthy diet for overall and mental health.    Medication compliance: Pt reports medications are being taken daily as prescribed. Discussed the importance of compliance with prescriber's directions and Pt was instructed to report questions/concerns, as well as missed doses or discontinuation of medication by Pt.     Safety Plan in Place: Yes  Reviewed plan Details: Pt denies SI/HI/SIB recent or current    Daily Functioning:  Since last report, Pt states symptoms are causing Moderate difficulty in daily functioning.      Content Discussion:   Pt reports life updates since previous session. Pt identified current stressors. Pt acknowledged stressors within and outside of one's control. Pt identified successes and issues with utilizing coping mechanisms.  Pt states she has started medication for depression a few weeks ago, and Pt has noticed an improvement.  Pt states she had discussed concerns with her provider and she now takes it at night and it is much better.  Pt states right now \"I am loving life.\"  Pt states she continues to work on the disability process, and she is eager to get an income.  States she would like to work but with her physical issues and heart failure diagnosis, Pt would find it difficult to maintain working on a regular schedule.  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  Reports she is having some trauma based nightmares, and Pt was allowed to process verbally.  Pt states she will journal about the nightmares and continue the story in her journal to empower herself  and gain control over " her situation.  States she feels this will be helpful.       Counselor supported Pt in continued exploration of underlying belief systems which contribute to difficulty in connecting/vulnerability.  Through discussion, Pt identifies themes of preservation and overt emotional and physical reactivity when physical and/or emotional statis is in question, even in low or perceived threatening situations. Counselor supported Pt in identifying past experiences and underlying beliefs which contribute to these feelings and reactions.  Pt was supported and encouraged in processing emotions related to frustrations with the expectations of self and others.  Pt was encouraged to identify cultural and nuclear familial contexts which contribute to these concerns.  Pt was receptive and engaged in conversation, and Pt reports this was beneficial and applicable to their situation.  Discussed Pt getting an emotional support animal, and Pt states she will look into feasibility.       Reviewed coping skills and encouraged Pt to continue to practicing coping skills daily when not under distress. Pt was praised for their attempts to decrease symptoms and mitigate activating events.    Clinical Maneuvering/Intervention:  CBT was utilized to encourage Pt to identify maladaptive thoughts and behaviors and replace with more affirming and positive.Pt encouraged to set and maintain appropriate and healthy boundaries with others. Pt was instructed to practice daily using appropriate and specific words to communicate feelings to others.  Motivational interviewing used to encourage Pt to identify strengths which can be utilized in working toward treatment goals. Pt encouraged to practice daily learned skills such as controlled breathing, grounding, and mindfulness. Pt was encouraged to ask for help from support persons to assist them in maintaining stability and alleviate symptoms. Discussed the importance of being one's own mental health advocate  and to refrain from seeing the need to ask for help as a weakness. Pt was encouraged to formulate a plan of action to be more proactive in managing stressors and refrain from using reactive and automatic heightened emotional responses.     Solution-focused therapy employed to identify how Pt would like their life to be if they were to make positive changes. Pt encouraged to identify effective coping skills and strengths they can use to continue utilizing those skills. Pt encouraged to discontinue utilizing non-effective coping mechanisms. Pt provided with feedback to highlight achievements and personal and other resources. Encouraged use of SMART goals    Assisted patient in processing above session content; acknowledged and normalized patient’s thoughts, feelings, and concerns.  Rationalized patient thought process regarding concerns presented at session.  Discussed triggers associated with patient's  anxiety , depression , and grief Also discussed coping skills for patient to implement such as grounding , self care , and positive self talk     Allowed patient to freely discuss issues without interruption or judgment. Provided safe, confidential environment to facilitate the development of positive therapeutic relationship and encourage open, honest communication. Assisted patient in identifying risk factors which would indicate the need for higher level of care including thoughts to harm self or others and/or self-harming behavior and encouraged patient to contact this office, call 911, or present to the nearest emergency room should any of these events occur. Discussed crisis intervention services and means to access. Patient adamantly and convincingly denies current suicidal or homicidal ideation or perceptual disturbance.    Assessment:     Patient appears to maintain relative stability as compared to their baseline.  However, patient persistently struggles with symptoms which continue to cause impairment in  important areas of functioning.  As a result, they can be reasonably expected to continue to benefit from treatment and would likely be at increased risk for decompensation otherwise.          Mental Status Exam:   Hygiene:   good  Cooperation:  Cooperative  Eye Contact:  Good  Psychomotor Behavior:  Appropriate  Affect:  Full range  Mood: anxious  Speech:  Normal  Thought Process:  Goal directed  Thought Content:  Normal  Suicidal:  None  Homicidal: None  Hallucinations:  None  Delusion: None  Memory:  Intact  Orientation:  Grossly Intact  Reliability:  good  Insight:  Good  Judgement:  Good  Impulse Control:  Good  Physical/Medical Issues:   chronic health issues        Functional Status: Moderate impairment     Progress toward goal: Not at goal    Prognosis: Good with continued therapeutic intervention        Plan:    Patient will continue in individual outpatient therapy with focus on improved functioning and coping skills, maintaining stability, and avoiding decompensation and the need for higher level of care.    Patient will adhere to medication regimen as prescribed and report any side effects. Patient will contact this office, call 911 or present to the nearest emergency room should suicidal or homicidal ideations occur. Provide Cognitive Behavioral Therapy and Solution Focused Therapy to improve functioning, maintain stability, and avoid decompensation and the need for higher level of care.     Return in about 2 weeks (around 5/13/2025).      VISIT DIAGNOSIS:    Diagnosis Plan   1. Major depressive disorder, recurrent episode, moderate        2. Anxiety disorder, unspecified type        3. Post traumatic stress disorder (PTSD)        4. Sleeping difficulties         12:13 EDT       This document has been electronically signed by GREGORY Adrian  April 29, 2025      Part of this note may be an electronic transcription/translation of spoken language to printed text using the Dragon Dictation System.

## 2025-05-08 ENCOUNTER — TELEMEDICINE (OUTPATIENT)
Dept: PSYCHIATRY | Facility: CLINIC | Age: 55
End: 2025-05-08
Payer: MEDICAID

## 2025-05-08 DIAGNOSIS — F33.1 MAJOR DEPRESSIVE DISORDER, RECURRENT EPISODE, MODERATE: Primary | Chronic | ICD-10-CM

## 2025-05-08 DIAGNOSIS — F41.9 ANXIETY DISORDER, UNSPECIFIED TYPE: Chronic | ICD-10-CM

## 2025-05-08 DIAGNOSIS — G47.00 INSOMNIA, UNSPECIFIED TYPE: ICD-10-CM

## 2025-05-08 RX ORDER — TRAZODONE HYDROCHLORIDE 100 MG/1
100 TABLET ORAL NIGHTLY PRN
Qty: 30 TABLET | Refills: 1 | Status: SHIPPED | OUTPATIENT
Start: 2025-05-08

## 2025-05-08 RX ORDER — CARVEDILOL 12.5 MG/1
12.5 TABLET ORAL
COMMUNITY
Start: 2025-04-30 | End: 2026-04-30

## 2025-05-08 RX ORDER — HYDRALAZINE HYDROCHLORIDE 10 MG/1
10 TABLET, FILM COATED ORAL
COMMUNITY
Start: 2025-04-30 | End: 2026-04-30

## 2025-05-08 NOTE — PROGRESS NOTES
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  12.3 - 15.4 % Final    RDW-SD 01/31/2025 52.6  37.0 - 54.0 fl Final    MPV 01/31/2025 10.9  6.0 - 12.0 fL Final    Platelets 01/31/2025 270  140 - 450 10*3/mm3 Final    Neutrophil % 01/31/2025 51.1  42.7 - 76.0 % Final    Lymphocyte % 01/31/2025 42.6  19.6 - 45.3 % Final    Monocyte % 01/31/2025 3.7 (L)  5.0 - 12.0 % Final    Eosinophil % 01/31/2025 1.2  0.3 - 6.2 % Final    Basophil % 01/31/2025 0.9  0.0 - 1.5 % Final    Immature Grans % 01/31/2025 0.5  0.0 - 0.5 % Final    Neutrophils, Absolute 01/31/2025 3.35  1.70 - 7.00 10*3/mm3 Final    Lymphocytes, Absolute 01/31/2025 2.79  0.70 - 3.10 10*3/mm3 Final    Monocytes, Absolute 01/31/2025 0.24  0.10 - 0.90 10*3/mm3 Final    Eosinophils, Absolute 01/31/2025 0.08  0.00 - 0.40 10*3/mm3 Final    Basophils, Absolute 01/31/2025 0.06  0.00 - 0.20 10*3/mm3 Final    Immature Grans, Absolute 01/31/2025 0.03  0.00 - 0.05 10*3/mm3 Final    nRBC 01/31/2025 0.0  0.0 - 0.2 /100 WBC Final   Lab on 12/04/2024   Component Date Value Ref Range Status    TSH 12/04/2024 50.900 (H)  0.270 - 4.200 uIU/mL Final    Free T4 12/04/2024 0.66 (L)  0.92 - 1.68 ng/dL Final     Magnesium 12/04/2024 2.0  1.6 - 2.6 mg/dL Final    Glucose 12/04/2024 103 (H)  65 - 99 mg/dL Final    BUN 12/04/2024 15  6 - 20 mg/dL Final    Creatinine 12/04/2024 1.15 (H)  0.57 - 1.00 mg/dL Final    Sodium 12/04/2024 138  136 - 145 mmol/L Final    Potassium 12/04/2024 4.3  3.5 - 5.2 mmol/L Final    Chloride 12/04/2024 105  98 - 107 mmol/L Final    CO2 12/04/2024 21.7 (L)  22.0 - 29.0 mmol/L Final    Calcium 12/04/2024 9.2  8.6 - 10.5 mg/dL Final    BUN/Creatinine Ratio 12/04/2024 13.0  7.0 - 25.0 Final    Anion Gap 12/04/2024 11.3  5.0 - 15.0 mmol/L Final    eGFR 12/04/2024 57.4 (L)  >60.0 mL/min/1.73 Final    Hemoglobin A1C 12/04/2024 6.60 (H)  4.80 - 5.60 % Final   Lab on 07/31/2024   Component Date Value Ref Range Status    Total Cholesterol 07/31/2024 161  0 - 200 mg/dL Final    Triglycerides 07/31/2024 185 (H)  0 - 150 mg/dL Final    HDL Cholesterol 07/31/2024 29 (L)  40 - 60 mg/dL Final    LDL Cholesterol  07/31/2024 100  0 - 100 mg/dL Final    VLDL Cholesterol 07/31/2024 32  5 - 40 mg/dL Final    LDL/HDL Ratio 07/31/2024 3.28   Final    Hemoglobin A1C 07/31/2024 6.40 (H)  4.80 - 5.60 % Final   Lab on 07/26/2024   Component Date Value Ref Range Status    Glucose 07/26/2024 122 (H)  65 - 99 mg/dL Final    BUN 07/26/2024 19  6 - 20 mg/dL Final    Creatinine 07/26/2024 1.34 (H)  0.57 - 1.00 mg/dL Final    Sodium 07/26/2024 139  136 - 145 mmol/L Final    Potassium 07/26/2024 4.4  3.5 - 5.2 mmol/L Final    Chloride 07/26/2024 100  98 - 107 mmol/L Final    CO2 07/26/2024 27.5  22.0 - 29.0 mmol/L Final    Calcium 07/26/2024 9.8  8.6 - 10.5 mg/dL Final    Total Protein 07/26/2024 7.6  6.0 - 8.5 g/dL Final    Albumin 07/26/2024 4.3  3.5 - 5.2 g/dL Final    ALT (SGPT) 07/26/2024 13  1 - 33 U/L Final    AST (SGOT) 07/26/2024 16  1 - 32 U/L Final    Alkaline Phosphatase 07/26/2024 121 (H)  39 - 117 U/L Final    Total Bilirubin 07/26/2024 0.4  0.0 - 1.2 mg/dL Final    Globulin 07/26/2024 3.3  gm/dL Final    A/G Ratio  07/26/2024 1.3  g/dL Final    BUN/Creatinine Ratio 07/26/2024 14.2  7.0 - 25.0 Final    Anion Gap 07/26/2024 11.5  5.0 - 15.0 mmol/L Final    eGFR 07/26/2024 47.8 (L)  >60.0 mL/min/1.73 Final           Assessment & Plan   Problems Addressed this Visit    None  Visit Diagnoses         Major depressive disorder, recurrent episode, moderate  (Chronic)   -  Primary    Relevant Medications    Vortioxetine HBr (Trintellix) 5 MG tablet tablet    traZODone (DESYREL) 100 MG tablet      Anxiety disorder, unspecified type  (Chronic)       Relevant Medications    Vortioxetine HBr (Trintellix) 5 MG tablet tablet    traZODone (DESYREL) 100 MG tablet      Insomnia, unspecified type        Relevant Medications    traZODone (DESYREL) 100 MG tablet          Diagnoses         Codes Comments      Major depressive disorder, recurrent episode, moderate    -  Primary ICD-10-CM: F33.1  ICD-9-CM: 296.32       Anxiety disorder, unspecified type     ICD-10-CM: F41.9  ICD-9-CM: 300.00       Insomnia, unspecified type     ICD-10-CM: G47.00  ICD-9-CM: 780.52             Visit Diagnoses:    ICD-10-CM ICD-9-CM   1. Major depressive disorder, recurrent episode, moderate  F33.1 296.32   2. Anxiety disorder, unspecified type  F41.9 300.00   3. Insomnia, unspecified type  G47.00 780.52           GOALS:  Short Term Goals: Patient will be compliant with medication, and patient will have no significant medication related side effects.  Patient will be engaged in psychotherapy as indicated.  Patient will report subjective improvement of symptoms.  Long term goals: To stabilize mood and treat/improve subjective symptoms, the patient will stay out of the hospital, the patient will be at an optimal level of functioning, and the patient will take all medications as prescribed.  The patient verbalized understanding and agreement with goals that were mutually set.      TREATMENT PLAN: Continue supportive psychotherapy efforts and take medications as indicated.   Medication and treatment options, both pharmacological and non-pharmacological treatment options, discussed during today's visit, including any off label use of medication. Patient acknowledged and verbally consented with current treatment plan and was educated on the importance of compliance with treatment and follow-up appointments.      -Continue Trintellix 5 mg by mouth once daily with food for mood.  -Continue trazodone 100 mg by mouth once nightly as needed for sleeping difficulties.    -The patient is prescribed Pregabalin by an outside provider.    Visit Time (face to face direct patient care):  58 minutes of face to face direct patient care with the patient spent in further diagnosis and evaluation, coordination of care, and counseling the patient regarding diagnoses and treatment planning. Answered any questions patient had with medication and treatment plan.          Total Time spent by this APRN for today's encounter:  65 total minutes were spent by this APRN on charting/documentation, review of past available medical records, direct face to face patient care, coordination of care, and prescribing medication.       MEDICATION ISSUES:  Discussed medication options and treatment plan of prescribed medication, any off label use of medication, as well as the risks, benefits, any black box warnings including increased suicidality, and side effects including but not limited to potential falls, dizziness, possible impaired driving, GI side effects (change in appetite, abdominal discomfort, nausea, vomiting, diarrhea, and/or constipation), dry mouth, somnolence, sedation, insomnia, activation, agitation, irritation, tremors, abnormal muscle movements or disorders, headache, sweating, possible bruising or rare bleeding, electrolyte and/or fluid abnormalities, change in blood pressure/heart rate/and or heart rhythm, sexual dysfunction, and metabolic adversities among others. Patient and/or guardian agreeable to  call the office with any worsening of symptoms or onset of side effects, or if any concerns or questions arise.  The contact information for the office is made available to the patient and/or guardian.  Patient and/or guardian agreeable to call 911 or go to the nearest ER should they begin having any SI/HI, or if any urgent concerns arise.  Further educational information provided in after visit summary for patient to review.    Due to the nature of virtual visits and inability to monitor vital signs and weight with virtual visits, the patient has been encouraged to monitor their vital signs and weight regularly either through self-monitoring via home device(s) or with their Primary Care Provider, and the patient has been instructed to notify this APRN of any abnormalities or changes from baseline.    Patient aware of limitations of provider's availability and office hours, and how to reach provider/office if needed (office number for patient to call for any questions/concerns: [phone]). If the patient's needs require more frequent or intensive management/monitoring than can be provided from this provider utilizing a strictly virtual platform, or care that is outside of this provider's scope, then patient may be referred to more appropriate provider or modality.      VERBAL INFORMED CONSENT FOR MEDICATION:  The patient was educated that their proposed/prescribed psychotropic medication(s) has potential risks, side effects, adverse effects, and black box warnings; and these have been discussed with the patient.  The patient has been informed that their treatment and medication dosage is to be individualized, and may even be above or below the recommended range/dosage due to patient individualization and response, but medication is prescribed using a shared decision making approach, and no medication or dosage will be prescribed without the patient's verbal consent.  The reason for the use of the medication  including any off label use and alternative modes of treatment other than or in addition to medication has been considered and discussed, the probable consequences of not receiving the proposed treatment have been discussed, and any treatment side effects, black box warnings, and cautions associated with treatment have been discussed with the patient.  The patient is allowed ample time to openly discuss and ask questions regarding the proposed medication(s) and treatment plan and the patient verbalizes understanding the reasons for the use of the medication, its potential risks and benefits, other alternative treatment(s), and the probable consequences that may occur if the proposed medication is not given.  The patient has been given ample time to ask questions and study the information and find the information to be specific, accurate, and complete.  The patient gives verbal consent for the medication(s) proposed/prescribed, they verbalized understanding that they can refuse and withdraw consent at any time with the assistance of this APRN, and the patient has verbally confirmed that they are aware, and are willing, to take the prescribed medication and follow the treatment plan with the known possible risks, side effect, black box warnings, and any potential medication interactions, and the patient reports they will be worse off without this medication and treatment plan.  The patient is advised to contact this APRN/this office if any questions or concerns arise at any time (at [phone]), or call 911/go to the closest emergency department if needed or outside of office hours.      SUICIDE RISK ASSESSMENT AND SAFETY PLAN: Unalterable demographics and a history of mental health intervention indicate this patient is in a high risk category compared to the general population. At present, the patient denies active SI/HI, intentions, or plans at this time and agrees to seek immediate care should such thoughts  develop. The patient verbalizes understanding of how to access emergency care if needed and agrees to do so. Consideration of suicide risk and protective factors such as history, current presentation, individual strengths and weaknesses, psychosocial and environmental stressors and variables, psychiatric illness and symptoms, medical conditions and pain, took place in this interview. Based on those considerations, the patient is determined: within individual baseline and presenting no imminent risk for suicide or homicide. Other recommendations: The patient does not meet the criteria for inpatient admission and is not a safety risk to self or others at today's visit. Inpatient treatment offers no significant advantages over outpatient treatment for this patient at today's visit.  The patient was given ample time for questions and fully participated in treatment planning.  The patient was encouraged to call the clinic with any questions or concerns.  The patient was informed of access to emergency care. If patient were to develop any significant symptomatology, suicidal ideation, homicidal ideation, any concerns, or feel unsafe at any time they are to call the clinic and if unable to get immediate assistance should immediately call 911 or go to the nearest emergency room.  Patient contracted verbally for the following: If you are experiencing an emotional crisis or have thoughts of harming yourself or others, please go to your nearest local emergency room or call 911. Will continue to re-assess medication response and side effects frequently to establish efficacy and ensure safety. Risks, any black box warnings, side effects, off label usage, and benefits of medication and treatment discussed with patient, along with potential adverse side effects of current and/or newly prescribed medication, alternative treatment options, and OTC medications.  Patient verbalized understanding of potential risks, any off label use  of medication, any black box warnings, and any side effects in their own words. The patient verbalized understanding and agreed to comply with the safety plan discussed in their own words.  Patient given the number to the office. Number also discussed of the 24- hour suicide hotline.           MEDS ORDERED DURING VISIT:  New Medications Ordered This Visit   Medications    Vortioxetine HBr (Trintellix) 5 MG tablet tablet     Sig: Take 1 tablet by mouth Daily With Breakfast.     Dispense:  30 tablet     Refill:  1    traZODone (DESYREL) 100 MG tablet     Sig: Take 1 tablet by mouth At Night As Needed for Sleep.     Dispense:  30 tablet     Refill:  1       Return in about 6 weeks (around 6/19/2025), or if symptoms worsen or fail to improve, for Next scheduled follow up and Recheck.           Future Appointments         Provider Department Center    5/14/2025 9:00 AM Jerri Cherry [facility]    5/28/2025 8:00 AM Jerri Cherry [facility]    6/10/2025 9:00 AM Jerri Cherry [facility]    6/16/2025 1:30 PM Joseph Fitch DO [facility]    6/17/2025 9:00 AM (Arrive by 8:45 AM) Bridgette Vasquez APRN [facility]    8/14/2025 11:00 AM Clarence Machado MD [facility]    8/18/2025 2:00 PM (Arrive by 1:45 PM) Anatoly Dyer APRN [facility]                Progress towards goal: Not at goal    Functional Status: Moderate impairment     Prognosis: Good with Ongoing Treatment             This document has been electronically signed by BONITA Raza  May 8, 2025 10:37 EDT    Some of the data in this electronic note has been brought forward from a previous encounter, any necessary changes have been made, it has been reviewed by  this APRN, and it is accurate.    Please note that portions of this note were completed with a voice recognition program.

## 2025-05-14 ENCOUNTER — TELEMEDICINE (OUTPATIENT)
Dept: PSYCHIATRY | Facility: CLINIC | Age: 55
End: 2025-05-14
Payer: MEDICAID

## 2025-05-14 DIAGNOSIS — F41.9 ANXIETY DISORDER, UNSPECIFIED TYPE: ICD-10-CM

## 2025-05-14 DIAGNOSIS — F43.10 POST TRAUMATIC STRESS DISORDER (PTSD): ICD-10-CM

## 2025-05-14 DIAGNOSIS — F33.1 MAJOR DEPRESSIVE DISORDER, RECURRENT EPISODE, MODERATE: Primary | ICD-10-CM

## 2025-05-14 DIAGNOSIS — G47.00 INSOMNIA, UNSPECIFIED TYPE: ICD-10-CM

## 2025-05-14 NOTE — TREATMENT PLAN
Multi-Disciplinary Problems (from Behavioral Health Treatment Plan)      Active Problems       Problem: Anxiety  Start Date: 05/14/25      Problem Details: The patient self-scales this problem as a 8 with 10 being the worst.          Goal Priority Start Date Expected End Date End Date    Patient will develop and implement behavioral and cognitive strategies to reduce anxiety and irrational fears. High 05/14/25 11/12/25 --    Goal Details: Progress toward goal:  Not appropriate to rate progress toward goal since this is the initial treatment plan.        Goal Intervention Frequency Start Date End Date    Help patient explore past emotional issues in relation to present anxiety. Q2 Weeks 05/14/25 --    Intervention Details: Duration of treatment until remission of symptoms.      Help patient explore past emotional issues in relation to present anxiety.     Pt will increase understanding of anxious feelings by processing origin of anxious feelings  Pt will employ positive and affirming self talk to reduce or eliminate anxiety  Pt will take all medications as prescribed.  Pt will attend counseling regularly  Pt will decrease ANIBAL-7 score by half        Goal Intervention Frequency Start Date End Date    Help patient develop an awareness of their cognitive and physical responses to anxiety. Q2 Weeks 05/14/25 --    Intervention Details: Duration of treatment until remission of symptoms.      Help patient develop an awareness of their cognitive and physical responses to anxiety.  Pt will decrease frequency of panic feelings by half  Pt will practice use of learned coping skills daily when not under distress so skills are more easily employed when needed.  Pt will use learned relaxation techniques (controlled breathing, progressive muscle relaxation, mindfulness)  DBT and CBT will be utilized to assist Pt in recognizing heightened emotional responses to stress, and using a multi-faceted approach, Pt will identify ways to  mitigate associated symptoms.                Problem: Depression  Start Date: 05/14/25      Problem Details: The patient self-scales this problem as a 5 with 10 being the worst.          Goal Priority Start Date Expected End Date End Date    Patient will demonstrate the ability to initiate new constructive life skills outside of sessions on a consistent basis. Medium 05/14/25 11/12/25 --    Goal Details: Progress toward goal:  Not appropriate to rate progress toward goal since this is the initial treatment plan.        Goal Intervention Frequency Start Date End Date    Assist patient in setting attainable activities of daily living goals. Q2 Weeks 05/14/25 --    Intervention Details: Assist patient in setting attainable activities of daily living goals.    Increase understanding of depressive feelings  Pt will decrease PHQ-9 score by half  Address underlying issues that may be contributing to Pt's depression  Set boundaries as needed  Take all medications as prescribed  Actively participate in counseling  Comply with all contracts for safety         Goal Intervention Frequency Start Date End Date    Provide education about depression Q2 Weeks 05/14/25 --    Intervention Details: Duration of treatment until remission of symptoms.      Provide education about depression     Counselor will provide detailed explanation of how depression can affect one's emotional and physical health.  Pt will ask questions as need to gain an more in-depth understanding of their depression  Pt will inform Counselor if there is a need for a higher level of care up to and including hospitalization.          Goal Intervention Frequency Start Date End Date    Assist patient in developing healthy coping strategies. Q2 Weeks 05/14/25 --    Intervention Details: Duration of treatment until remission of symptoms.    Counselor will work with Pt to develop a set of coping skills that Pt may access outside of Counseling  Pt will practice coping  skills daily when not under distress so these skills are more readily recalled when needed.  Pt will replace negative self defeating self talk with more positive life affirming statements to improve mood                Problem: Medical Issue  Start Date: 05/14/25      Problem Details: The patient self-scales this problem as a 5 with 10 being the worst.          Goal Priority Start Date Expected End Date End Date    Patient will verbalize emotional, cognitive and behavioral changes needed to address health issues. Medium 05/14/25 11/12/25 --    Goal Details: Progress toward goal:  Not appropriate to rate progress toward goal since this is the initial treatment plan.    Pt was educated about the benefits of expressing and processing feelings using journaling via writing, art, and/or other formats.        Goal Intervention Frequency Start Date End Date    Reinforce emotional stability, behavioral responsibility and positive self talk that reduces risk to health. Q2 Weeks 05/14/25 --    Intervention Details: Duration of treatment until remission of symptoms    Pt will use encouraging self talk to replace intrusive thoughts and negative self-defeating internal dialogue..                Problem: Trauma and Stressor Related Disorders  Start Date: 05/14/25      Problem Details: The patient self-scales this problem as a 8 with 10 being the worst.          Goal Priority Start Date Expected End Date End Date    Patient will process and move through trauma in a way that improves self regard and the patients ability to function optimally in the world around them. High 05/14/25 11/12/25 --    Goal Details: Progress toward goal:  Not appropriate to rate progress toward goal since this is the initial treatment plan.    Reduce frequency of flashbacks   Use art and journaling to process feelings associated with traumatic and uncomfortable events and situations.  Expand and utilize social support network  Engage in relaxation  techniques to mitigate physical and emotional responses to stressors  Identify and challenge negative and irrational thoughts  Set boundaries as needed  Take medications as prescribed  Attend counseling sessions  Comply with contracts for safety        Goal Intervention Frequency Start Date End Date    Assist patient in identifying ways that trauma has negatively impacted their view of themselves and the world. Q2 Weeks 05/14/25 --    Intervention Details: Duration of treatment until remission of symptoms.        Goal Intervention Frequency Start Date End Date    Process trauma in the context of the safe session environment. Q2 Weeks 05/14/25 --    Intervention Details: Duration of treatment until remission of symptoms.        Goal Intervention Frequency Start Date End Date    Develop a plan of behavior changes that will reduce the stress of the trauma. Q2 Weeks 05/14/25 --    Intervention Details: Duration of treatment until remission of symptoms.    Counselor will work with Pt to develop a set of coping skills that Pt may access outside of Counseling  Pt will practice coping skills daily when not under distress so these skills are more readily recalled when needed.  Pt will replace negative self defeating self talk with more positive life affirming statements to improve mood                        Reviewed By       Jerri Cherry LP[facility] 05/14/25 1346    Jerri Cherry [facility] 05/14/25 1345                     I have discussed and reviewed this treatment plan with the patient.
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
## 2025-05-19 DIAGNOSIS — G47.00 INSOMNIA, UNSPECIFIED TYPE: ICD-10-CM

## 2025-05-19 DIAGNOSIS — F41.9 ANXIETY DISORDER, UNSPECIFIED TYPE: Chronic | ICD-10-CM

## 2025-05-19 DIAGNOSIS — F33.1 MAJOR DEPRESSIVE DISORDER, RECURRENT EPISODE, MODERATE: Chronic | ICD-10-CM

## 2025-05-21 ENCOUNTER — TELEMEDICINE (OUTPATIENT)
Dept: FAMILY MEDICINE CLINIC | Facility: TELEHEALTH | Age: 55
End: 2025-05-21
Payer: MEDICAID

## 2025-05-21 DIAGNOSIS — T36.95XA ANTIBIOTIC-INDUCED YEAST INFECTION: ICD-10-CM

## 2025-05-21 DIAGNOSIS — B37.9 ANTIBIOTIC-INDUCED YEAST INFECTION: ICD-10-CM

## 2025-05-21 DIAGNOSIS — J01.00 ACUTE MAXILLARY SINUSITIS, RECURRENCE NOT SPECIFIED: Primary | ICD-10-CM

## 2025-05-21 RX ORDER — FLUCONAZOLE 150 MG/1
TABLET ORAL
Qty: 2 TABLET | Refills: 0 | Status: SHIPPED | OUTPATIENT
Start: 2025-05-21

## 2025-05-21 RX ORDER — PREDNISONE 10 MG/1
TABLET ORAL
Qty: 21 TABLET | Refills: 0 | Status: SHIPPED | OUTPATIENT
Start: 2025-05-21
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
## 2025-05-27 DIAGNOSIS — I50.9 CHRONIC HEART FAILURE, UNSPECIFIED HEART FAILURE TYPE: ICD-10-CM

## 2025-05-27 DIAGNOSIS — E78.1 HYPERTRIGLYCERIDEMIA: ICD-10-CM

## 2025-05-28 RX ORDER — FUROSEMIDE 20 MG/1
20 TABLET ORAL DAILY
Qty: 30 TABLET | Refills: 1 | Status: SHIPPED | OUTPATIENT
Start: 2025-05-28

## 2025-05-28 RX ORDER — GEMFIBROZIL 600 MG/1
600 TABLET, FILM COATED ORAL DAILY
Qty: 30 TABLET | Refills: 2 | Status: SHIPPED | OUTPATIENT
Start: 2025-05-28

## 2025-06-06 DIAGNOSIS — I20.0 UNSTABLE ANGINA: ICD-10-CM

## 2025-06-06 RX ORDER — NITROGLYCERIN 400 UG/1
SPRAY ORAL
Qty: 4.9 G | Refills: 0 | Status: SHIPPED | OUTPATIENT
Start: 2025-06-06

## 2025-06-10 ENCOUNTER — TELEMEDICINE (OUTPATIENT)
Dept: PSYCHIATRY | Facility: CLINIC | Age: 55
End: 2025-06-10
Payer: MEDICAID

## 2025-06-10 DIAGNOSIS — F41.9 ANXIETY DISORDER, UNSPECIFIED TYPE: ICD-10-CM

## 2025-06-10 DIAGNOSIS — F43.10 POST TRAUMATIC STRESS DISORDER (PTSD): ICD-10-CM

## 2025-06-10 DIAGNOSIS — F33.1 MAJOR DEPRESSIVE DISORDER, RECURRENT EPISODE, MODERATE: Primary | ICD-10-CM

## 2025-06-10 DIAGNOSIS — G47.00 INSOMNIA, UNSPECIFIED TYPE: ICD-10-CM

## 2025-06-10 NOTE — PROGRESS NOTES
Date: June 11, 2025  Time In: 9:04  Time out: 9:54      This provider is located at the [facility] (through [facility]), [address], [place] [address] using a secure [employer] Video Visit through [employer]. Patient is being seen remotely via telehealth, and they are in a secure environment for this session. The patient's condition being diagnosed/treated is appropriate for telemedicine. The provider identified herself as well as her credentials. The patient, and/or patients guardian, consent to be seen remotely, and when consent is given they understand that the consent allows for patient identifiable information to be sent to a third party as needed. They may refuse to be seen remotely at any time. The electronic data is encrypted and password protected, and the patient and/or guardian has been advised of the potential risks to privacy not withstanding such measures.     Mode of Visit: Video  Location of patient: home  Location of provider: Provider home  You have chosen to receive care through a telehealth visit.  The patient has signed the video visit consent form.  The visit included audio and video interaction. Poor connection quality due to documented Epic issues    Subjective   Jazmín Hicks is a 55 y.o. female who presents today fully oriented, appropriately dressed and groomed, and open to communication for follow up appointment.    Chief Complaint:   Chief Complaint   Patient presents with    Anxiety    Depression    PTSD    Sleeping Problem        History of Present Illness: Rapport was established through conversation and unconditional positive regard. Recent events were discussed and how these events impact Pt's emotional health.   Pt reports successful use of learned coping skills since last report.  Pt reports continuation of symptoms and states the intensity and duration of symptoms has remained unchanged since last report. Pt rates anxiety at 6/10  and depression at 5/10.     Sleep: Pt reports sleep has remained unchanged since last report. Healthy sleep habits were discussed such as maintaining a schedule, routine, avoiding caffeine, and limiting screen time before bed.  Pt reports continued difficulty with sleep onset and restorative sleep.     Appetite:  Pt reports appetite has been good since last report.  Discussed the importance of hydration and eating a healthy diet for overall and mental health.    Medication compliance: Pt reports medications are being taken daily as prescribed. Discussed the importance of compliance with prescriber's directions and Pt was instructed to report questions/concerns, as well as missed doses or discontinuation of medication by Pt.     Safety Plan in Place: No Pt denies SI/HI/SIB recent or current    Daily Functioning:  Since last report, Pt states symptoms are causing Moderate difficulty in daily functioning.      Content Discussion:   Pt reports life updates since previous session. Pt identified current stressors. Pt acknowledged stressors within and outside of one's control. Pt identified successes and issues with utilizing coping mechanisms.  Pt recalls being over-stimulated as long as she can recall and having to isolate herself at family functions especially over 10 people.  Pt would go in the kitchen and wash dishes to get away from everyone talking at family functions.  Pt has done some research on ADHD, and Pt feels she does have some traits that she feels are causing impairment and difficulty for her.  Pt states she is somewhat rigid in how she does things and Pt's roommate is messy and scattered.   Pt states she fell last week four times while tripping over the piles of things in the home.  Pt got bruises from the falls.  Pt has had three weeks of a brain fog and she has some mild pain in her shoulder.  Pt states she will message PCP immediately after this session to let them know of  he disorientation.  Pt  "states cleaning had always been a comfort, a constant for Pt.  It was a means of control for Pt's mother and for Pt.   \"No matter what happened in life, no matter how bad it was, cleaning was always a comfort.\"  That was something my Mom and I did together.\"  It was not demanding or negative or a punishment.    \"It was just Mom and me from age 13 to 21 and it's what we did\"  Dad  at 11.  \"It was comforting for us.\"  Due to Pt's health issues she is no longer able to use cleaning and organizing as a calming mechanism.   Pt was down on herself for not being able to do the jobs she wanted.    \"My value has always been tied to how hard can you wok.\"   Pt states she was fearful that she would not be able to work at her job if she did not take the Covid vaccine, and Pt feels the vaccine has caused her to have numerous issues. States her.      Counselor supported Pt in continued exploration of underlying belief systems which contribute to difficulty in connecting/vulnerability.  Through discussion, Pt identifies themes of preservation and overt emotional and physical reactivity when physical and/or emotional statis is in question, even in low or perceived threatening situations. Counselor supported Pt in identifying past experiences and underlying beliefs which contribute to these feelings and reactions.  Pt was supported and encouraged in processing emotions related to frustrations with the expectations of self and others.  Pt was encouraged to identify cultural and nuclear familial contexts which contribute to these concerns.  Pt was receptive and engaged in conversation, and Pt reports this was beneficial and applicable to their situation.      Reviewed coping skills and encouraged Pt to continue to practicing coping skills daily when not under distress. Pt was praised for their attempts to decrease symptoms and mitigate activating events.    Clinical Maneuvering/Intervention:  CBT was utilized to encourage Pt to " identify maladaptive thoughts and behaviors and replace with more affirming and positive.Pt encouraged to set and maintain appropriate and healthy boundaries with others. Pt was instructed to practice daily using appropriate and specific words to communicate feelings to others.  Motivational interviewing used to encourage Pt to identify strengths which can be utilized in working toward treatment goals. Pt encouraged to practice daily learned skills such as controlled breathing, grounding, and mindfulness. Pt was encouraged to ask for help from support persons to assist them in maintaining stability and alleviate symptoms. Discussed the importance of being one's own mental health advocate and to refrain from seeing the need to ask for help as a weakness. Pt was encouraged to formulate a plan of action to be more proactive in managing stressors and refrain from using reactive and automatic heightened emotional responses.     Solution-focused therapy employed to identify how Pt would like their life to be if they were to make positive changes. Pt encouraged to identify effective coping skills and strengths they can use to continue utilizing those skills. Pt encouraged to discontinue utilizing non-effective coping mechanisms. Pt provided with feedback to highlight achievements and personal and other resources. Encouraged use of SMART goals    Assisted patient in processing above session content; acknowledged and normalized patient’s thoughts, feelings, and concerns.  Rationalized patient thought process regarding concerns presented at session.  Discussed triggers associated with patient's  anxiety , depression , and PTSD Also discussed coping skills for patient to implement such as mindfulness , increasing activity , self care , and positive self talk     Allowed patient to freely discuss issues without interruption or judgment. Provided safe, confidential environment to facilitate the development of positive  therapeutic relationship and encourage open, honest communication. Assisted patient in identifying risk factors which would indicate the need for higher level of care including thoughts to harm self or others and/or self-harming behavior and encouraged patient to contact this office, call 911, or present to the nearest emergency room should any of these events occur. Discussed crisis intervention services and means to access. Patient adamantly and convincingly denies current suicidal or homicidal ideation or perceptual disturbance.    Assessment:     Patient appears to maintain relative stability as compared to their baseline.  However, patient persistently struggles with symptoms which continue to cause impairment in important areas of functioning.  As a result, they can be reasonably expected to continue to benefit from treatment and would likely be at increased risk for decompensation otherwise.        Mental Status Exam:   Hygiene:   good  Cooperation:  Cooperative  Eye Contact:  Good  Psychomotor Behavior:  Appropriate  Affect:  Full range  Mood: anxious  Speech:  Normal  Thought Process:  Goal directed  Thought Content:  Normal  Suicidal:  None  Homicidal: None  Hallucinations:  None  Delusion: None  Memory:  Intact  Orientation:  Grossly Intact  Reliability:  good  Insight:  Good  Judgement:  Good  Impulse Control:  Good  Physical/Medical Issues:  chronic health issues       Functional Status: Moderate impairment     Progress toward goal: Not at goal    Prognosis: Good with continued therapeutic intervention        Plan:    Patient will continue in individual outpatient therapy with focus on improved functioning and coping skills, maintaining stability, and avoiding decompensation and the need for higher level of care.    Patient will adhere to medication regimen as prescribed and report any side effects. Patient will contact this office, call 911 or present to the nearest emergency room should suicidal or  homicidal ideations occur. Provide Cognitive Behavioral Therapy and Solution Focused Therapy to improve functioning, maintain stability, and avoid decompensation and the need for higher level of care.     Return in about 2 weeks (around 6/24/2025).      VISIT DIAGNOSIS:    Diagnosis Plan   1. Major depressive disorder, recurrent episode, moderate        2. Anxiety disorder, unspecified type        3. Post traumatic stress disorder (PTSD)        4. Insomnia, unspecified type         09:03 EDT       This document has been electronically signed by GREGORY Adrian  June 11, 2025      Part of this note may be an electronic transcription/translation of spoken language to printed text using the Dragon Dictation System.

## 2025-06-17 ENCOUNTER — TELEMEDICINE (OUTPATIENT)
Dept: PSYCHIATRY | Facility: CLINIC | Age: 55
End: 2025-06-17
Payer: MEDICAID

## 2025-06-17 DIAGNOSIS — G47.00 INSOMNIA, UNSPECIFIED TYPE: ICD-10-CM

## 2025-06-17 DIAGNOSIS — F41.9 ANXIETY DISORDER, UNSPECIFIED TYPE: Chronic | ICD-10-CM

## 2025-06-17 DIAGNOSIS — F33.1 MAJOR DEPRESSIVE DISORDER, RECURRENT EPISODE, MODERATE: Primary | Chronic | ICD-10-CM

## 2025-06-17 RX ORDER — VORTIOXETINE 5 MG/1
5 TABLET, FILM COATED ORAL
Qty: 30 TABLET | Refills: 1 | OUTPATIENT
Start: 2025-06-17

## 2025-06-17 RX ORDER — TRAZODONE HYDROCHLORIDE 100 MG/1
100 TABLET ORAL NIGHTLY PRN
Qty: 30 TABLET | Refills: 1 | Status: SHIPPED | OUTPATIENT
Start: 2025-06-17

## 2025-06-17 RX ORDER — TRAZODONE HYDROCHLORIDE 100 MG/1
TABLET ORAL
Qty: 30 TABLET | Refills: 1 | OUTPATIENT
Start: 2025-06-17

## 2025-06-17 NOTE — PROGRESS NOTES
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 Final    QT Interval 01/31/2025 436  ms Final    QTC Interval 01/31/2025 497  ms Final    WBC 01/31/2025 6.55  3.40 - 10.80 10*3/mm3 Final    RBC 01/31/2025 5.03  3.77 - 5.28 10*6/mm3 Final    Hemoglobin 01/31/2025 13.0  12.0 - 15.9 g/dL Final    Hematocrit 01/31/2025 42.5  34.0 - 46.6 % Final    MCV 01/31/2025 84.5  79.0 - 97.0 fL Final    MCH 01/31/2025 25.8 (L)  26.6 - 33.0 pg Final    MCHC 01/31/2025 30.6 (L)  31.5 - 35.7 g/dL Final    RDW 01/31/2025 17.2 (H)  12.3 - 15.4 % Final    RDW-SD 01/31/2025 52.6  37.0 - 54.0 fl Final    MPV 01/31/2025 10.9  6.0 - 12.0 fL Final    Platelets 01/31/2025 270  140 - 450 10*3/mm3 Final    Neutrophil % 01/31/2025 51.1  42.7 - 76.0 % Final    Lymphocyte % 01/31/2025 42.6  19.6 - 45.3 % Final    Monocyte % 01/31/2025 3.7 (L)  5.0 - 12.0 % Final    Eosinophil % 01/31/2025 1.2  0.3 - 6.2 % Final    Basophil % 01/31/2025 0.9  0.0 - 1.5 % Final    Immature Grans % 01/31/2025 0.5  0.0 - 0.5 % Final    Neutrophils, Absolute 01/31/2025 3.35  1.70 - 7.00 10*3/mm3 Final    Lymphocytes, Absolute 01/31/2025 2.79  0.70 - 3.10 10*3/mm3 Final    Monocytes, Absolute 01/31/2025 0.24  0.10 - 0.90 10*3/mm3 Final    Eosinophils, Absolute 01/31/2025 0.08   0.00 - 0.40 10*3/mm3 Final    Basophils, Absolute 01/31/2025 0.06  0.00 - 0.20 10*3/mm3 Final    Immature Grans, Absolute 01/31/2025 0.03  0.00 - 0.05 10*3/mm3 Final    nRBC 01/31/2025 0.0  0.0 - 0.2 /100 WBC Final   Lab on 12/04/2024   Component Date Value Ref Range Status    TSH 12/04/2024 50.900 (H)  0.270 - 4.200 uIU/mL Final    Free T4 12/04/2024 0.66 (L)  0.92 - 1.68 ng/dL Final    Magnesium 12/04/2024 2.0  1.6 - 2.6 mg/dL Final    Glucose 12/04/2024 103 (H)  65 - 99 mg/dL Final    BUN 12/04/2024 15  6 - 20 mg/dL Final    Creatinine 12/04/2024 1.15 (H)  0.57 - 1.00 mg/dL Final    Sodium 12/04/2024 138  136 - 145 mmol/L Final    Potassium 12/04/2024 4.3  3.5 - 5.2 mmol/L Final    Chloride 12/04/2024 105  98 - 107 mmol/L Final    CO2 12/04/2024 21.7 (L)  22.0 - 29.0 mmol/L Final    Calcium 12/04/2024 9.2  8.6 - 10.5 mg/dL Final    BUN/Creatinine Ratio 12/04/2024 13.0  7.0 - 25.0 Final    Anion Gap 12/04/2024 11.3  5.0 - 15.0 mmol/L Final    eGFR 12/04/2024 57.4 (L)  >60.0 mL/min/1.73 Final    Hemoglobin A1C 12/04/2024 6.60 (H)  4.80 - 5.60 % Final   Lab on 07/31/2024   Component Date Value Ref Range Status    Total Cholesterol 07/31/2024 161  0 - 200 mg/dL Final    Triglycerides 07/31/2024 185 (H)  0 - 150 mg/dL Final    HDL Cholesterol 07/31/2024 29 (L)  40 - 60 mg/dL Final    LDL Cholesterol  07/31/2024 100  0 - 100 mg/dL Final    VLDL Cholesterol 07/31/2024 32  5 - 40 mg/dL Final    LDL/HDL Ratio 07/31/2024 3.28   Final    Hemoglobin A1C 07/31/2024 6.40 (H)  4.80 - 5.60 % Final   Lab on 07/26/2024   Component Date Value Ref Range Status    Glucose 07/26/2024 122 (H)  65 - 99 mg/dL Final    BUN 07/26/2024 19  6 - 20 mg/dL Final    Creatinine 07/26/2024 1.34 (H)  0.57 - 1.00 mg/dL Final    Sodium 07/26/2024 139  136 - 145 mmol/L Final    Potassium 07/26/2024 4.4  3.5 - 5.2 mmol/L Final    Chloride 07/26/2024 100  98 - 107 mmol/L Final    CO2 07/26/2024 27.5  22.0 - 29.0 mmol/L Final    Calcium 07/26/2024 9.8   8.6 - 10.5 mg/dL Final    Total Protein 07/26/2024 7.6  6.0 - 8.5 g/dL Final    Albumin 07/26/2024 4.3  3.5 - 5.2 g/dL Final    ALT (SGPT) 07/26/2024 13  1 - 33 U/L Final    AST (SGOT) 07/26/2024 16  1 - 32 U/L Final    Alkaline Phosphatase 07/26/2024 121 (H)  39 - 117 U/L Final    Total Bilirubin 07/26/2024 0.4  0.0 - 1.2 mg/dL Final    Globulin 07/26/2024 3.3  gm/dL Final    A/G Ratio 07/26/2024 1.3  g/dL Final    BUN/Creatinine Ratio 07/26/2024 14.2  7.0 - 25.0 Final    Anion Gap 07/26/2024 11.5  5.0 - 15.0 mmol/L Final    eGFR 07/26/2024 47.8 (L)  >60.0 mL/min/1.73 Final           Assessment & Plan   Problems Addressed this Visit    None  Visit Diagnoses         Major depressive disorder, recurrent episode, moderate  (Chronic)   -  Primary    Relevant Medications    Vortioxetine HBr (Trintellix) 10 MG tablet tablet    traZODone (DESYREL) 100 MG tablet      Anxiety disorder, unspecified type  (Chronic)       Relevant Medications    Vortioxetine HBr (Trintellix) 10 MG tablet tablet    traZODone (DESYREL) 100 MG tablet      Insomnia, unspecified type        Relevant Medications    traZODone (DESYREL) 100 MG tablet          Diagnoses         Codes Comments      Major depressive disorder, recurrent episode, moderate    -  Primary ICD-10-CM: F33.1  ICD-9-CM: 296.32       Anxiety disorder, unspecified type     ICD-10-CM: F41.9  ICD-9-CM: 300.00       Insomnia, unspecified type     ICD-10-CM: G47.00  ICD-9-CM: 780.52             Visit Diagnoses:    ICD-10-CM ICD-9-CM   1. Major depressive disorder, recurrent episode, moderate  F33.1 296.32   2. Anxiety disorder, unspecified type  F41.9 300.00   3. Insomnia, unspecified type  G47.00 780.52           GOALS:  Short Term Goals: Patient will be compliant with medication, and patient will have no significant medication related side effects.  Patient will be engaged in psychotherapy as indicated.  Patient will report subjective improvement of symptoms.  Long term goals: To  stabilize mood and treat/improve subjective symptoms, the patient will stay out of the hospital, the patient will be at an optimal level of functioning, and the patient will take all medications as prescribed.  The patient verbalized understanding and agreement with goals that were mutually set.      TREATMENT PLAN: Continue supportive psychotherapy efforts and take medications as indicated.  Medication and treatment options, both pharmacological and non-pharmacological treatment options, discussed during today's visit, including any off label use of medication. Patient acknowledged and verbally consented with current treatment plan and was educated on the importance of compliance with treatment and follow-up appointments.      -Increase Trintellix to 10 mg by mouth once daily with food for mood, to take with food.  -Continue trazodone 100 mg by mouth once nightly as needed for sleeping difficulties.    -The patient is prescribed Pregabalin by an outside provider.    Visit Time (face to face direct patient care):  51 minutes of face to face direct patient care with the patient spent in further diagnosis and evaluation, coordination of care, and counseling the patient regarding diagnoses and treatment planning. Answered any questions patient had with medication and treatment plan.          Total Time spent by this APRN for today's encounter:  55 total minutes were spent by this APRN on charting/documentation, review of past available medical records, direct face to face patient care, coordination of care, and prescribing medication.       MEDICATION ISSUES:  Discussed medication options and treatment plan of prescribed medication, any off label use of medication, as well as the risks, benefits, any black box warnings including increased suicidality, and side effects including but not limited to potential falls, dizziness, possible impaired driving, GI side effects (change in appetite, abdominal discomfort, nausea,  vomiting, diarrhea, and/or constipation), dry mouth, somnolence, sedation, insomnia, activation, agitation, irritation, tremors, abnormal muscle movements or disorders, headache, sweating, possible bruising or rare bleeding, electrolyte and/or fluid abnormalities, change in blood pressure/heart rate/and or heart rhythm, sexual dysfunction, and metabolic adversities among others. Patient and/or guardian agreeable to call the office with any worsening of symptoms or onset of side effects, or if any concerns or questions arise.  The contact information for the office is made available to the patient and/or guardian.  Patient and/or guardian agreeable to call 911 or go to the nearest ER should they begin having any SI/HI, or if any urgent concerns arise.    Further educational information provided in after visit summary for patient to review.    Due to the nature of virtual visits and inability to monitor vital signs and weight with virtual visits, the patient has been encouraged to monitor their vital signs and weight regularly either through self-monitoring via home device(s) or with their Primary Care Provider, and the patient has been instructed to notify this APRN of any abnormalities or changes from baseline.    Patient aware of limitations of provider's availability and office hours, and how to reach provider/office if needed (office number for patient to call for any questions/concerns: [phone]). If the patient's needs require more frequent or intensive management/monitoring than can be provided from this provider utilizing a strictly virtual platform, or care that is outside of this provider's scope, then patient may be referred to more appropriate provider or modality.      VERBAL INFORMED CONSENT FOR MEDICATION:  The patient was educated that their proposed/prescribed psychotropic medication(s) has potential risks, side effects, adverse effects, and black box warnings; and these have been discussed with  the patient.  The patient has been informed that their treatment and medication dosage is to be individualized, and may even be above or below the recommended range/dosage due to patient individualization and response, but medication is prescribed using a shared decision making approach, and no medication or dosage will be prescribed without the patient's verbal consent.  The reason for the use of the medication including any off label use and alternative modes of treatment other than or in addition to medication has been considered and discussed, the probable consequences of not receiving the proposed treatment have been discussed, and any treatment side effects, black box warnings, and cautions associated with treatment have been discussed with the patient.  The patient is allowed ample time to openly discuss and ask questions regarding the proposed medication(s) and treatment plan and the patient verbalizes understanding the reasons for the use of the medication, its potential risks and benefits, other alternative treatment(s), and the probable consequences that may occur if the proposed medication is not given.  The patient has been given ample time to ask questions and study the information and find the information to be specific, accurate, and complete.  The patient gives verbal consent for the medication(s) proposed/prescribed, they verbalized understanding that they can refuse and withdraw consent at any time with the assistance of this APRN, and the patient has verbally confirmed that they are aware, and are willing, to take the prescribed medication and follow the treatment plan with the known possible risks, side effect, black box warnings, and any potential medication interactions, and the patient reports they will be worse off without this medication and treatment plan.  The patient is advised to contact this APRN/this office if any questions or concerns arise at any time (at [phone]), or call  911/go to the closest emergency department if needed or outside of office hours.      SUICIDE RISK ASSESSMENT AND SAFETY PLAN: Unalterable demographics and a history of mental health intervention indicate this patient is in a high risk category compared to the general population. At present, the patient denies active SI/HI, intentions, or plans at this time and agrees to seek immediate care should such thoughts develop. The patient verbalizes understanding of how to access emergency care if needed and agrees to do so. Consideration of suicide risk and protective factors such as history, current presentation, individual strengths and weaknesses, psychosocial and environmental stressors and variables, psychiatric illness and symptoms, medical conditions and pain, took place in this interview. Based on those considerations, the patient is determined: within individual baseline and presenting no imminent risk for suicide or homicide. Other recommendations: The patient does not meet the criteria for inpatient admission and is not a safety risk to self or others at today's visit. Inpatient treatment offers no significant advantages over outpatient treatment for this patient at today's visit.  The patient was given ample time for questions and fully participated in treatment planning.  The patient was encouraged to call the clinic with any questions or concerns.  The patient was informed of access to emergency care. If patient were to develop any significant symptomatology, suicidal ideation, homicidal ideation, any concerns, or feel unsafe at any time they are to call the clinic and if unable to get immediate assistance should immediately call 911 or go to the nearest emergency room.  Patient contracted verbally for the following: If you are experiencing an emotional crisis or have thoughts of harming yourself or others, please go to your nearest local emergency room or call 911. Will continue to re-assess medication  response and side effects frequently to establish efficacy and ensure safety. Risks, any black box warnings, side effects, off label usage, and benefits of medication and treatment discussed with patient, along with potential adverse side effects of current and/or newly prescribed medication, alternative treatment options, and OTC medications.  Patient verbalized understanding of potential risks, any off label use of medication, any black box warnings, and any side effects in their own words. The patient verbalized understanding and agreed to comply with the safety plan discussed in their own words.  Patient given the number to the office. Number also discussed of the 24- hour suicide hotline.           MEDS ORDERED DURING VISIT:  New Medications Ordered This Visit   Medications    Vortioxetine HBr (Trintellix) 10 MG tablet tablet     Sig: Take 1 tablet by mouth Daily With Breakfast.     Dispense:  30 tablet     Refill:  1    traZODone (DESYREL) 100 MG tablet     Sig: Take 1 tablet by mouth At Night As Needed for Sleep.     Dispense:  30 tablet     Refill:  1       Return in about 4 weeks (around 7/15/2025), or if symptoms worsen or fail to improve, for Next scheduled follow up and Recheck.           Future Appointments         Provider Department Center    6/24/2025 3:00 PM Jerri Cherry [facility]    6/25/2025 3:15 PM (Arrive by 3:00 PM) Joseph Fitch DO [facility]    7/16/2025 8:00 AM Bridgette Vasquez APRN [facility]    7/25/2025 11:00 AM Jerri Cherry [facility]    8/8/2025 11:00 AM Jerri Cherry [facility]    8/14/2025 11:00 AM (Arrive by 10:45 AM) Clarence Machado MD [facility]    8/22/2025 10:00 AM Jerri Cherry [facility]    8/25/2025 12:00 PM (Arrive by 11:45 AM) Anatoly Dyer, BONITA [facility]                Progress towards goal: Not at goal    Functional Status: Moderate impairment     Prognosis: Good with Ongoing Treatment             This document has been electronically signed by BONITA Raza  June 17, 2025 12:16 EDT    Some of the data in this electronic note has been brought forward from a previous encounter, any necessary changes have been made, it has been reviewed by this APRN, and it is accurate.    Please note that portions of this note were completed with a voice recognition program.

## 2025-06-20 ENCOUNTER — TELEMEDICINE (OUTPATIENT)
Dept: FAMILY MEDICINE CLINIC | Facility: TELEHEALTH | Age: 55
End: 2025-06-20
Payer: MEDICAID

## 2025-06-20 DIAGNOSIS — B37.31 VAGINAL CANDIDA: ICD-10-CM

## 2025-06-20 DIAGNOSIS — N39.0 URINARY TRACT INFECTION WITHOUT HEMATURIA, SITE UNSPECIFIED: Primary | ICD-10-CM

## 2025-06-20 RX ORDER — SULFAMETHOXAZOLE AND TRIMETHOPRIM 800; 160 MG/1; MG/1
1 TABLET ORAL 2 TIMES DAILY
Qty: 6 TABLET | Refills: 0 | Status: SHIPPED | OUTPATIENT
Start: 2025-06-20 | End: 2025-06-23

## 2025-06-20 RX ORDER — FLUCONAZOLE 150 MG/1
TABLET ORAL
Qty: 2 TABLET | Refills: 0 | Status: SHIPPED | OUTPATIENT
Start: 2025-06-20

## 2025-06-20 NOTE — PATIENT INSTRUCTIONS
Urinary Tract Infection, Female  A urinary tract infection (UTI) is an infection in your urinary tract. The urinary tract is made up of organs that make, store, and get rid of pee (urine) in your body. These organs include:  The kidneys.  The ureters.  The bladder.  The urethra.  What are the causes?  Most UTIs are caused by germs called bacteria. They may be in or near your genitals. These germs grow and cause swelling in your urinary tract.  What increases the risk?  You're more likely to get a UTI if:  You're a female. The urethra is shorter in females than in males.  You have a soft tube called a catheter that drains your pee.  You can't control when you pee or poop.  You have trouble peeing because of:  A kidney stone.  A urinary blockage.  A nerve condition that affects your bladder.  Not getting enough to drink.  You're sexually active.  You use a birth control inside your vagina, like spermicide.  You're pregnant.  You have low levels of the hormone estrogen in your body.  You're an older adult.  You're also more likely to get a UTI if you have other health problems. These may include:  Diabetes.  A weak immune system. Your immune system is your body's defense system.  Sickle cell disease.  Injury of the spine.  What are the signs or symptoms?  Symptoms may include:  Needing to pee right away.  Peeing small amounts often.  Pain or burning when you pee.  Blood in your pee.  Pee that smells bad or odd.  Pain in your belly or lower back.  You may also:  Feel confused. This may be the first symptom in older adults.  Vomit.  Not feel hungry.  Feel tired or easily annoyed.  Have a fever or chills.  How is this diagnosed?  A UTI is diagnosed based on your medical history and an exam. You may also have other tests. These may include:  Pee tests.  Blood tests.  Tests for sexually transmitted infections (STIs).  If you've had more than one UTI, you may need to have imaging studies done to find out why you keep getting  them.  How is this treated?  A UTI can be treated by:  Taking antibiotics or other medicines.  Drinking enough fluid to keep your pee pale yellow.  In rare cases, a UTI can cause a very bad condition called sepsis. Sepsis may be treated in the hospital.  Follow these instructions at home:  Medicines  Take your medicines only as told by your health care provider.  If you were given antibiotics, take them as told by your provider. Do not stop taking them even if you start to feel better.  General instructions  Make sure you:  Pee often and fully. Do not hold your pee for a long time.  Wipe from front to back after you pee or poop. Use each tissue only once when you wipe.  Pee after you have sex.  Do not douche or use sprays or powders in your genital area.  Contact a health care provider if:  Your symptoms don't get better after 1-2 days of taking antibiotics.  Your symptoms go away and then come back.  You have a fever or chills.  You vomit or feel like you may vomit.  Get help right away if:  You have very bad pain in your back or lower belly.  You faint.  This information is not intended to replace advice given to you by your health care provider. Make sure you discuss any questions you have with your health care provider.  Document Revised: 07/25/2024 Document Reviewed: 03/22/2024  [employer] Patient Education © 2024 [employer] Vaginal Yeast Infection, Adult    Vaginal yeast infection is a condition that causes vaginal discharge as well as soreness, swelling, and redness (inflammation) of the vagina. This is a common condition. Some women get this infection frequently.  What are the causes?  This condition is caused by a change in the normal balance of the yeast (Candida) and normal bacteria that live in the vagina. This change causes an overgrowth of yeast, which causes the inflammation.  What increases the risk?  The condition is more likely to develop in women who:  Take antibiotic medicines.  Have  diabetes.  Take birth control pills.  Are pregnant.  Douche often.  Have a weak body defense system (immune system).  Have been taking steroid medicines for a long time.  Frequently wear tight clothing.  What are the signs or symptoms?  Symptoms of this condition include:  White, thick, creamy vaginal discharge.  Swelling, itching, redness, and irritation of the vagina. The lips of the vagina (labia) may be affected as well.  Pain or a burning feeling while urinating.  Pain during sex.  How is this diagnosed?  This condition is diagnosed based on:  Your medical history.  A physical exam.  A pelvic exam. Your health care provider will examine a sample of your vaginal discharge under a microscope. Your health care provider may send this sample for testing to confirm the diagnosis.  How is this treated?  This condition is treated with medicine. Medicines may be over-the-counter or prescription. You may be told to use one or more of the following:  Medicine that is taken by mouth (orally).  Medicine that is applied as a cream (topically).  Medicine that is inserted directly into the vagina (suppository).  Follow these instructions at home:  Take or apply over-the-counter and prescription medicines only as told by your health care provider.  Do not use tampons until your health care provider approves.  Do not have sex until your infection has cleared. Sex can prolong or worsen your symptoms of infection. Ask your health care provider when it is safe to resume sexual activity.  Keep all follow-up visits. This is important.  How is this prevented?    Do not wear tight clothes, such as pantyhose or tight pants.  Wear breathable cotton underwear.  Do not use douches, perfumed soap, creams, or powders.  Wipe from front to back after using the toilet.  If you have diabetes, keep your blood sugar levels under control.  Ask your health care provider for other ways to prevent yeast infections.  Contact a health care provider  if:  You have a fever.  Your symptoms go away and then return.  Your symptoms do not get better with treatment.  Your symptoms get worse.  You have new symptoms.  You develop blisters in or around your vagina.  You have blood coming from your vagina and it is not your menstrual period.  You develop pain in your abdomen.  Summary  Vaginal yeast infection is a condition that causes discharge as well as soreness, swelling, and redness (inflammation) of the vagina.  This condition is treated with medicine. Medicines may be over-the-counter or prescription.  Take or apply over-the-counter and prescription medicines only as told by your health care provider.  Do not douche. Resume sexual activity or use of tampons as instructed by your health care provider.  Contact a health care provider if your symptoms do not get better with treatment or your symptoms go away and then return.  This information is not intended to replace advice given to you by your health care provider. Make sure you discuss any questions you have with your health care provider.  Document Revised: 03/07/2022 Document Reviewed: 03/07/2022  [employer] Patient Education © 2024 [employer]
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
## 2025-06-24 ENCOUNTER — TELEMEDICINE (OUTPATIENT)
Dept: PSYCHIATRY | Facility: CLINIC | Age: 55
End: 2025-06-24
Payer: MEDICAID

## 2025-06-24 DIAGNOSIS — F43.10 POST TRAUMATIC STRESS DISORDER (PTSD): ICD-10-CM

## 2025-06-24 DIAGNOSIS — F41.9 ANXIETY DISORDER, UNSPECIFIED TYPE: ICD-10-CM

## 2025-06-24 DIAGNOSIS — G47.00 INSOMNIA, UNSPECIFIED TYPE: ICD-10-CM

## 2025-06-24 DIAGNOSIS — F33.1 MAJOR DEPRESSIVE DISORDER, RECURRENT EPISODE, MODERATE: Primary | ICD-10-CM
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
## 2025-06-26 NOTE — PATIENT INSTRUCTIONS
[place] warm Line: Phone number: [phone]      Monday through Friday 10 AM to 9 PM and Saturdays 5 PM to 9 PM.      A warmline is a NON-CRISIS number to call to get emotional support. You can call to have a conversation with someone who can provide support during hard times. Warmlines are staffed by trained peers who have been through their own mental health struggles and know what it's like to need help.      -Suicide hotline number: 988      -[facility] Resource Connection      The resource line is dedicated to providing behavioral health resources to residents of [place] and [facility], seven days a week, 7 a.m.-7 p.m.      [phone]      [email or web address]  [facility]"[employer]/BehavioralHealth      Should you ever need assistance or just want to reach out to someone when your behavioral health provider is not available due to the office being closed you can contact [email or web address].       -Just text HOME to 237377 and someone will reach out to you within a few minutes.  This is a 24/7 help line and they are open even on holidays.

## 2025-07-01 DIAGNOSIS — R60.0 PERIPHERAL EDEMA: ICD-10-CM

## 2025-07-01 RX ORDER — HYDROCHLOROTHIAZIDE 25 MG/1
25 TABLET ORAL DAILY
Qty: 30 TABLET | Refills: 1 | Status: SHIPPED | OUTPATIENT
Start: 2025-07-01

## 2025-07-07 ENCOUNTER — TELEMEDICINE (OUTPATIENT)
Dept: FAMILY MEDICINE CLINIC | Facility: TELEHEALTH | Age: 55
End: 2025-07-07
Payer: MEDICAID

## 2025-07-07 DIAGNOSIS — R11.0 NAUSEA: ICD-10-CM

## 2025-07-07 DIAGNOSIS — U07.1 COVID-19: Primary | ICD-10-CM

## 2025-07-07 DIAGNOSIS — E11.40 TYPE 2 DIABETES MELLITUS WITH DIABETIC NEUROPATHY, WITHOUT LONG-TERM CURRENT USE OF INSULIN: ICD-10-CM

## 2025-07-07 DIAGNOSIS — M79.7 FIBROMYALGIA: ICD-10-CM

## 2025-07-07 DIAGNOSIS — G62.9 NEUROPATHY: ICD-10-CM

## 2025-07-07 RX ORDER — ONDANSETRON 4 MG/1
4 TABLET, ORALLY DISINTEGRATING ORAL EVERY 8 HOURS PRN
Qty: 9 TABLET | Refills: 0 | Status: SHIPPED | OUTPATIENT
Start: 2025-07-07
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
## 2025-07-09 DIAGNOSIS — G62.9 NEUROPATHY: ICD-10-CM

## 2025-07-09 DIAGNOSIS — M79.7 FIBROMYALGIA: ICD-10-CM

## 2025-07-09 RX ORDER — MAGNESIUM GLUCONATE 27 MG(500)
500 TABLET ORAL 2 TIMES DAILY
OUTPATIENT
Start: 2025-07-09

## 2025-07-16 ENCOUNTER — TELEMEDICINE (OUTPATIENT)
Dept: PSYCHIATRY | Facility: CLINIC | Age: 55
End: 2025-07-16
Payer: MEDICAID

## 2025-07-16 DIAGNOSIS — F33.1 MAJOR DEPRESSIVE DISORDER, RECURRENT EPISODE, MODERATE: Primary | Chronic | ICD-10-CM

## 2025-07-16 DIAGNOSIS — F41.9 ANXIETY DISORDER, UNSPECIFIED TYPE: Chronic | ICD-10-CM

## 2025-07-16 DIAGNOSIS — G47.00 INSOMNIA, UNSPECIFIED TYPE: ICD-10-CM

## 2025-07-16 RX ORDER — TRAZODONE HYDROCHLORIDE 100 MG/1
100 TABLET ORAL NIGHTLY PRN
Qty: 30 TABLET | Refills: 0 | Status: SHIPPED | OUTPATIENT
Start: 2025-07-16

## 2025-07-16 NOTE — PROGRESS NOTES
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  ====================================================================  For clinical consultation, please call [phone].  ====================================================================     Admission on 01/31/2025, Discharged on 01/31/2025   Component Date Value Ref Range Status    Glucose 01/31/2025 109 (H)  65 - 99 mg/dL Final    BUN 01/31/2025 11  6 - 20 mg/dL Final    Creatinine 01/31/2025 0.84  0.57 - 1.00 mg/dL Final    Sodium 01/31/2025 139  136 - 145 mmol/L Final    Potassium 01/31/2025 3.8  3.5 - 5.2 mmol/L Final    Slight hemolysis detected by analyzer. Result may be falsely elevated.    Chloride 01/31/2025 102  98 - 107 mmol/L Final    CO2 01/31/2025 24.0  22.0 - 29.0 mmol/L Final    Calcium 01/31/2025 9.3  8.6 - 10.5 mg/dL Final    BUN/Creatinine Ratio 01/31/2025 13.1  7.0 - 25.0 Final    Anion Gap 01/31/2025 13.0  5.0 - 15.0 mmol/L Final    eGFR 01/31/2025 83.7  >60.0 mL/min/1.73 Final    TSH 01/31/2025 51.690 (H)  0.270 - 4.200 uIU/mL Final    Free T4 01/31/2025 0.59 (L)  0.92 - 1.68 ng/dL Final    Magnesium 01/31/2025 2.2  1.6 - 2.6 mg/dL Final    QT Interval 01/31/2025 436  ms Final    QTC Interval 01/31/2025 497  ms Final    WBC 01/31/2025 6.55  3.40 - 10.80 10*3/mm3  Final    RBC 01/31/2025 5.03  3.77 - 5.28 10*6/mm3 Final    Hemoglobin 01/31/2025 13.0  12.0 - 15.9 g/dL Final    Hematocrit 01/31/2025 42.5  34.0 - 46.6 % Final    MCV 01/31/2025 84.5  79.0 - 97.0 fL Final    MCH 01/31/2025 25.8 (L)  26.6 - 33.0 pg Final    MCHC 01/31/2025 30.6 (L)  31.5 - 35.7 g/dL Final    RDW 01/31/2025 17.2 (H)  12.3 - 15.4 % Final    RDW-SD 01/31/2025 52.6  37.0 - 54.0 fl Final    MPV 01/31/2025 10.9  6.0 - 12.0 fL Final    Platelets 01/31/2025 270  140 - 450 10*3/mm3 Final    Neutrophil % 01/31/2025 51.1  42.7 - 76.0 % Final    Lymphocyte % 01/31/2025 42.6  19.6 - 45.3 % Final    Monocyte % 01/31/2025 3.7 (L)  5.0 - 12.0 % Final    Eosinophil % 01/31/2025 1.2  0.3 - 6.2 % Final    Basophil % 01/31/2025 0.9  0.0 - 1.5 % Final    Immature Grans % 01/31/2025 0.5  0.0 - 0.5 % Final    Neutrophils, Absolute 01/31/2025 3.35  1.70 - 7.00 10*3/mm3 Final    Lymphocytes, Absolute 01/31/2025 2.79  0.70 - 3.10 10*3/mm3 Final    Monocytes, Absolute 01/31/2025 0.24  0.10 - 0.90 10*3/mm3 Final    Eosinophils, Absolute 01/31/2025 0.08  0.00 - 0.40 10*3/mm3 Final    Basophils, Absolute 01/31/2025 0.06  0.00 - 0.20 10*3/mm3 Final    Immature Grans, Absolute 01/31/2025 0.03  0.00 - 0.05 10*3/mm3 Final    nRBC 01/31/2025 0.0  0.0 - 0.2 /100 WBC Final   Lab on 12/04/2024   Component Date Value Ref Range Status    TSH 12/04/2024 50.900 (H)  0.270 - 4.200 uIU/mL Final    Free T4 12/04/2024 0.66 (L)  0.92 - 1.68 ng/dL Final    Magnesium 12/04/2024 2.0  1.6 - 2.6 mg/dL Final    Glucose 12/04/2024 103 (H)  65 - 99 mg/dL Final    BUN 12/04/2024 15  6 - 20 mg/dL Final    Creatinine 12/04/2024 1.15 (H)  0.57 - 1.00 mg/dL Final    Sodium 12/04/2024 138  136 - 145 mmol/L Final    Potassium 12/04/2024 4.3  3.5 - 5.2 mmol/L Final    Chloride 12/04/2024 105  98 - 107 mmol/L Final    CO2 12/04/2024 21.7 (L)  22.0 - 29.0 mmol/L Final    Calcium 12/04/2024 9.2  8.6 - 10.5 mg/dL Final    BUN/Creatinine Ratio 12/04/2024 13.0  7.0  - 25.0 Final    Anion Gap 12/04/2024 11.3  5.0 - 15.0 mmol/L Final    eGFR 12/04/2024 57.4 (L)  >60.0 mL/min/1.73 Final    Hemoglobin A1C 12/04/2024 6.60 (H)  4.80 - 5.60 % Final   Lab on 07/31/2024   Component Date Value Ref Range Status    Total Cholesterol 07/31/2024 161  0 - 200 mg/dL Final    Triglycerides 07/31/2024 185 (H)  0 - 150 mg/dL Final    HDL Cholesterol 07/31/2024 29 (L)  40 - 60 mg/dL Final    LDL Cholesterol  07/31/2024 100  0 - 100 mg/dL Final    VLDL Cholesterol 07/31/2024 32  5 - 40 mg/dL Final    LDL/HDL Ratio 07/31/2024 3.28   Final    Hemoglobin A1C 07/31/2024 6.40 (H)  4.80 - 5.60 % Final   Lab on 07/26/2024   Component Date Value Ref Range Status    Glucose 07/26/2024 122 (H)  65 - 99 mg/dL Final    BUN 07/26/2024 19  6 - 20 mg/dL Final    Creatinine 07/26/2024 1.34 (H)  0.57 - 1.00 mg/dL Final    Sodium 07/26/2024 139  136 - 145 mmol/L Final    Potassium 07/26/2024 4.4  3.5 - 5.2 mmol/L Final    Chloride 07/26/2024 100  98 - 107 mmol/L Final    CO2 07/26/2024 27.5  22.0 - 29.0 mmol/L Final    Calcium 07/26/2024 9.8  8.6 - 10.5 mg/dL Final    Total Protein 07/26/2024 7.6  6.0 - 8.5 g/dL Final    Albumin 07/26/2024 4.3  3.5 - 5.2 g/dL Final    ALT (SGPT) 07/26/2024 13  1 - 33 U/L Final    AST (SGOT) 07/26/2024 16  1 - 32 U/L Final    Alkaline Phosphatase 07/26/2024 121 (H)  39 - 117 U/L Final    Total Bilirubin 07/26/2024 0.4  0.0 - 1.2 mg/dL Final    Globulin 07/26/2024 3.3  gm/dL Final    A/G Ratio 07/26/2024 1.3  g/dL Final    BUN/Creatinine Ratio 07/26/2024 14.2  7.0 - 25.0 Final    Anion Gap 07/26/2024 11.5  5.0 - 15.0 mmol/L Final    eGFR 07/26/2024 47.8 (L)  >60.0 mL/min/1.73 Final           Assessment & Plan   Problems Addressed this Visit    None  Visit Diagnoses         Major depressive disorder, recurrent episode, moderate  (Chronic)   -  Primary    Relevant Medications    Vortioxetine HBr (Trintellix) 10 MG tablet tablet    traZODone (DESYREL) 100 MG tablet      Anxiety disorder,  unspecified type  (Chronic)       Relevant Medications    Vortioxetine HBr (Trintellix) 10 MG tablet tablet    traZODone (DESYREL) 100 MG tablet      Insomnia, unspecified type        Relevant Medications    traZODone (DESYREL) 100 MG tablet          Diagnoses         Codes Comments      Major depressive disorder, recurrent episode, moderate    -  Primary ICD-10-CM: F33.1  ICD-9-CM: 296.32       Anxiety disorder, unspecified type     ICD-10-CM: F41.9  ICD-9-CM: 300.00       Insomnia, unspecified type     ICD-10-CM: G47.00  ICD-9-CM: 780.52             Visit Diagnoses:    ICD-10-CM ICD-9-CM   1. Major depressive disorder, recurrent episode, moderate  F33.1 296.32   2. Anxiety disorder, unspecified type  F41.9 300.00   3. Insomnia, unspecified type  G47.00 780.52           GOALS:  Short Term Goals: Patient will be compliant with medication, and patient will have no significant medication related side effects.  Patient will be engaged in psychotherapy as indicated.  Patient will report subjective improvement of symptoms.  Long term goals: To stabilize mood and treat/improve subjective symptoms, the patient will stay out of the hospital, the patient will be at an optimal level of functioning, and the patient will take all medications as prescribed.  The patient verbalized understanding and agreement with goals that were mutually set.      TREATMENT PLAN: Continue supportive psychotherapy efforts and take medications as indicated.  Medication and treatment options, both pharmacological and non-pharmacological treatment options, discussed during today's visit, including any off label use of medication. Patient acknowledged and verbally consented with current treatment plan and was educated on the importance of compliance with treatment and follow-up appointments.      -Continue Trintellix 10 mg by mouth once daily for mood, to take with food.  -Continue trazodone 100 mg by mouth once nightly as needed for sleeping  difficulties.    -The patient is prescribed Pregabalin by an outside provider.      -65 minutes of direct face to face time spent with patient.  -75 total minutes were spent by this APRN on charting/documentation, review of past available medical records, direct face to face patient care, providing supportive psychotherapy, coordination of care, and prescribing medication.   -25 of those face to face minutes were spent for supportive psychotherapy including promoting the therapeutic alliance, strengthening self awareness and insights, strengthening coping skills, counseling the patient regarding diagnoses, and and in coordination of care.  This APRN assisted the patient in processing the patient's diagnoses including depression and anxiety and sleeping difficulties, and assisted the patient in validating and normalizing her thoughts, feelings, and concerns while being attentive, respectful, responsive, and using active listening without judgment.  This APRN answered any questions the patient had regarding medication and treatment plan.       MEDICATION ISSUES:  Current and future goals and objectives with treatment have been discussed.  The necessity and appropriateness for continuing with psychotropic medication, and current treatment plan, at this time and in the future, have been discussed with the patient and to be reevaluated at each encounter.  Discussed treatment plan and medication options of prescribed medication, including any off label use of medication, as well as the risks, benefits, black box warnings, and side effects including sedation or drowsiness and potential falls, possible impaired driving, gastrointestinal side effects, dry mouth, headaches, worsened or change in mood, behavioral changes, activating symptoms, suicidal and or homicidal ideations, changes in weight, and metabolic adversities among others. Patient is agreeable to call the office with any worsening of symptoms or onset of side  effects, or if any concerns or questions arise.  The contact information for the office is available to the patient, telephone number [phone]. Patient is agreeable to call 911 or go to the nearest emergency department should they begin having any suicidal or homicidal ideations, plans, or intent, or if any urgent concerns arise. No medication side effects or related complaints today.     Important educational information made available and provided in after visit summary for patient to review.    Due to the nature of virtual visits and inability to monitor vital signs and weight with virtual visits, the patient has been encouraged to monitor their vital signs and weight regularly either through self-monitoring via home device(s) or with their Primary Care Provider, and the patient has been instructed to notify this APRN of any abnormalities or changes from baseline.    Patient aware of limitations of provider's availability and office hours, and how to reach provider/office if needed (office number for patient to call for any questions/concerns: [phone]). If the patient's needs require more frequent or intensive management/monitoring than can be provided from this provider utilizing a strictly virtual platform, or care that is outside of this provider's scope, then patient may be referred to more appropriate provider or modality.      VERBAL INFORMED CONSENT FOR MEDICATION:  The patient was educated that their proposed/prescribed psychotropic medication(s) has potential risks, side effects, adverse effects, and black box warnings; and these have been discussed with the patient.  The patient has been informed that their treatment and medication dosage is to be individualized, and may even be above or below the recommended range/dosage due to patient individualization and response, but medication is prescribed using a shared decision making approach, and no medication or dosage will be prescribed without the  patient's verbal consent.  The reason for the use of the medication including any off label use and alternative modes of treatment other than or in addition to medication has been considered and discussed, the probable consequences of not receiving the proposed treatment have been discussed, and any treatment side effects, black box warnings, and cautions associated with treatment have been discussed with the patient.  The patient is allowed ample time to openly discuss and ask questions regarding the proposed medication(s) and treatment plan and the patient verbalizes understanding the reasons for the use of the medication, its potential risks and benefits, other alternative treatment(s), and the probable consequences that may occur if the proposed medication is not given.  The patient has been given ample time to ask questions and study the information and find the information to be specific, accurate, and complete.  The patient gives verbal consent for the medication(s) proposed/prescribed, they verbalized understanding that they can refuse and withdraw consent at any time with the assistance of this APRN, and the patient has verbally confirmed that they are aware, and are willing, to take the prescribed medication and follow the treatment plan with the known possible risks, side effect, black box warnings, and any potential medication interactions, and the patient reports they will be worse off without this medication and treatment plan.  The patient is advised to contact this APRN/this office if any questions or concerns arise at any time (at [phone]), or call 911/go to the closest emergency department if needed or outside of office hours.      SUICIDE RISK ASSESSMENT AND SAFETY PLAN: Unalterable demographics and a history of mental health intervention indicate this patient is in a high risk category compared to the general population. At present, the patient denies active SI/HI, intentions, or plans at  this time and agrees to seek immediate care should such thoughts develop. The patient verbalizes understanding of how to access emergency care if needed and agrees to do so. Consideration of suicide risk and protective factors such as history, current presentation, individual strengths and weaknesses, psychosocial and environmental stressors and variables, psychiatric illness and symptoms, medical conditions and pain, took place in this interview. Based on those considerations, the patient is determined: within individual baseline and presenting no imminent risk for suicide or homicide. Other recommendations: The patient does not meet the criteria for inpatient admission and is not a safety risk to self or others at today's visit. Inpatient treatment offers no significant advantages over outpatient treatment for this patient at today's visit.  The patient was given ample time for questions and fully participated in treatment planning.  The patient was encouraged to call the clinic with any questions or concerns.  The patient was informed of access to emergency care. If patient were to develop any significant symptomatology, suicidal ideation, homicidal ideation, any concerns, or feel unsafe at any time they are to call the clinic and if unable to get immediate assistance should immediately call 911 or go to the nearest emergency room.  Patient contracted verbally for the following: If you are experiencing an emotional crisis or have thoughts of harming yourself or others, please go to your nearest local emergency room or call 911. Will continue to re-assess medication response and side effects frequently to establish efficacy and ensure safety. Risks, any black box warnings, side effects, off label usage, and benefits of medication and treatment discussed with patient, along with potential adverse side effects of current and/or newly prescribed medication, alternative treatment options, and OTC medications.  Patient  verbalized understanding of potential risks, any off label use of medication, any black box warnings, and any side effects in their own words. The patient verbalized understanding and agreed to comply with the safety plan discussed in their own words.  Patient given the number to the office. Number also discussed of the 24- hour suicide hotline.           MEDS ORDERED DURING VISIT:  New Medications Ordered This Visit   Medications    Vortioxetine HBr (Trintellix) 10 MG tablet tablet     Sig: Take 1 tablet by mouth Daily With Breakfast.     Dispense:  30 tablet     Refill:  0    traZODone (DESYREL) 100 MG tablet     Sig: Take 1 tablet by mouth At Night As Needed for Sleep.     Dispense:  30 tablet     Refill:  0       Return in about 4 weeks (around 8/13/2025), or if symptoms worsen or fail to improve, for Next scheduled follow up and Recheck.           Future Appointments         Provider Department Center    7/18/2025 1:30 PM (Arrive by 1:15 PM) Joseph Fitch DO [facility]    7/25/2025 11:00 AM Jerri Cherry [facility]    8/8/2025 11:00 AM Jerri Cherry [facility]    8/13/2025 2:30 PM Bridgette Vasquez APRN [facility]    8/14/2025 11:00 AM (Arrive by 10:45 AM) Clarence Machado MD [facility]UMAT[facility]    8/22/2025 10:00 AM Jerri Cherry [facility]    8/25/2025 12:00 PM (Arrive by 11:45 AM) Anatoly Dyer APRN [facility]    9/15/2025 2:30 PM Bridgette Vasquez APRTEZ [facility]    10/22/2025 3:00 PM Jerri Cherry [facility]                Progress towards goal: Not at goal    Functional Status: Moderate impairment     Prognosis: Good with  Ongoing Treatment             This document has been electronically signed by BONITA Raza  July 16, 2025 15:13 EDT    Some of the data in this electronic note has been brought forward from a previous encounter, any necessary changes have been made, it has been reviewed by this APRN, and it is accurate.    Please note that portions of this note were completed with a voice recognition program.

## 2025-07-18 RX ORDER — PREGABALIN 150 MG/1
CAPSULE ORAL
Qty: 90 CAPSULE | Refills: 2 | OUTPATIENT
Start: 2025-07-18

## 2025-07-18 RX ORDER — PREGABALIN 150 MG/1
CAPSULE ORAL
Qty: 90 CAPSULE | OUTPATIENT
Start: 2025-07-18

## 2025-07-21 ENCOUNTER — OFFICE VISIT (OUTPATIENT)
Dept: FAMILY MEDICINE CLINIC | Facility: CLINIC | Age: 55
End: 2025-07-21
Payer: MEDICAID

## 2025-07-21 ENCOUNTER — LAB (OUTPATIENT)
Dept: LAB | Facility: HOSPITAL | Age: 55
End: 2025-07-21
Payer: MEDICAID

## 2025-07-21 VITALS
BODY MASS INDEX: 47.09 KG/M2 | WEIGHT: 293 LBS | TEMPERATURE: 97.6 F | HEART RATE: 96 BPM | OXYGEN SATURATION: 95 % | HEIGHT: 66 IN | DIASTOLIC BLOOD PRESSURE: 96 MMHG | SYSTOLIC BLOOD PRESSURE: 138 MMHG

## 2025-07-21 DIAGNOSIS — E03.9 ACQUIRED HYPOTHYROIDISM: ICD-10-CM

## 2025-07-21 DIAGNOSIS — E11.40 TYPE 2 DIABETES MELLITUS WITH DIABETIC NEUROPATHY, WITHOUT LONG-TERM CURRENT USE OF INSULIN: Primary | ICD-10-CM

## 2025-07-21 DIAGNOSIS — K92.1 BLOOD IN STOOL: ICD-10-CM

## 2025-07-21 DIAGNOSIS — G47.01 INSOMNIA DUE TO MEDICAL CONDITION: ICD-10-CM

## 2025-07-21 DIAGNOSIS — G47.00 INSOMNIA, UNSPECIFIED TYPE: ICD-10-CM

## 2025-07-21 DIAGNOSIS — G62.9 NEUROPATHY: ICD-10-CM

## 2025-07-21 DIAGNOSIS — E75.21 FABRY'S DISEASE: ICD-10-CM

## 2025-07-21 DIAGNOSIS — K57.90 DIVERTICULOSIS: ICD-10-CM

## 2025-07-21 DIAGNOSIS — E06.3 HASHIMOTO'S DISEASE: ICD-10-CM

## 2025-07-21 DIAGNOSIS — M79.7 FIBROMYALGIA: ICD-10-CM

## 2025-07-21 DIAGNOSIS — R04.0 FREQUENT NOSEBLEEDS: ICD-10-CM

## 2025-07-21 DIAGNOSIS — D35.2 PITUITARY MACROADENOMA: ICD-10-CM

## 2025-07-21 DIAGNOSIS — Z86.718 HISTORY OF DVT OF LOWER EXTREMITY: ICD-10-CM

## 2025-07-21 LAB
BASOPHILS # BLD AUTO: 0.08 10*3/MM3 (ref 0–0.2)
BASOPHILS NFR BLD AUTO: 0.9 % (ref 0–1.5)
DEPRECATED RDW RBC AUTO: 44 FL (ref 37–54)
EOSINOPHIL # BLD AUTO: 0.1 10*3/MM3 (ref 0–0.4)
EOSINOPHIL NFR BLD AUTO: 1.1 % (ref 0.3–6.2)
ERYTHROCYTE [DISTWIDTH] IN BLOOD BY AUTOMATED COUNT: 14.7 % (ref 12.3–15.4)
EXPIRATION DATE: ABNORMAL
HBA1C MFR BLD: 6.8 % (ref 4.5–5.7)
HCT VFR BLD AUTO: 45.4 % (ref 34–46.6)
HGB BLD-MCNC: 15 G/DL (ref 12–15.9)
IMM GRANULOCYTES # BLD AUTO: 0.04 10*3/MM3 (ref 0–0.05)
IMM GRANULOCYTES NFR BLD AUTO: 0.4 % (ref 0–0.5)
LYMPHOCYTES # BLD AUTO: 3.73 10*3/MM3 (ref 0.7–3.1)
LYMPHOCYTES NFR BLD AUTO: 41.3 % (ref 19.6–45.3)
Lab: ABNORMAL
MCH RBC QN AUTO: 27.6 PG (ref 26.6–33)
MCHC RBC AUTO-ENTMCNC: 33 G/DL (ref 31.5–35.7)
MCV RBC AUTO: 83.5 FL (ref 79–97)
MONOCYTES # BLD AUTO: 0.5 10*3/MM3 (ref 0.1–0.9)
MONOCYTES NFR BLD AUTO: 5.5 % (ref 5–12)
NEUTROPHILS NFR BLD AUTO: 4.59 10*3/MM3 (ref 1.7–7)
NEUTROPHILS NFR BLD AUTO: 50.8 % (ref 42.7–76)
NRBC BLD AUTO-RTO: 0 /100 WBC (ref 0–0.2)
PLATELET # BLD AUTO: 343 10*3/MM3 (ref 140–450)
PMV BLD AUTO: 10.5 FL (ref 6–12)
RBC # BLD AUTO: 5.44 10*6/MM3 (ref 3.77–5.28)
T4 FREE SERPL-MCNC: 0.98 NG/DL (ref 0.92–1.68)
TSH SERPL DL<=0.05 MIU/L-ACNC: 7.34 UIU/ML (ref 0.27–4.2)
WBC NRBC COR # BLD AUTO: 9.04 10*3/MM3 (ref 3.4–10.8)

## 2025-07-21 PROCEDURE — 85025 COMPLETE CBC W/AUTO DIFF WBC: CPT

## 2025-07-21 PROCEDURE — 84439 ASSAY OF FREE THYROXINE: CPT

## 2025-07-21 PROCEDURE — 84443 ASSAY THYROID STIM HORMONE: CPT

## 2025-07-21 RX ORDER — PREGABALIN 150 MG/1
CAPSULE ORAL
Qty: 90 CAPSULE | Refills: 2 | Status: SHIPPED | OUTPATIENT
Start: 2025-07-21

## 2025-07-21 NOTE — PATIENT INSTRUCTIONS
Take medications as ordered.  Low fat, low salt diet.  Exercise as tolerated.  Portion control and make good food choices.  Follow with specialist.  Keep appt with Endocrine and Gastroenterology.  Keep appt with ENT.  Labs today.
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
## 2025-07-22 RX ORDER — TRAZODONE HYDROCHLORIDE 50 MG/1
50 TABLET ORAL NIGHTLY
Qty: 30 TABLET | Refills: 2 | OUTPATIENT
Start: 2025-07-22

## 2025-07-22 RX ORDER — TRAZODONE HYDROCHLORIDE 100 MG/1
100 TABLET ORAL NIGHTLY PRN
Qty: 30 TABLET | Refills: 0 | Status: SHIPPED | OUTPATIENT
Start: 2025-07-22

## 2025-07-25 ENCOUNTER — TELEMEDICINE (OUTPATIENT)
Dept: PSYCHIATRY | Facility: CLINIC | Age: 55
End: 2025-07-25
Payer: MEDICAID

## 2025-07-25 DIAGNOSIS — F41.9 ANXIETY DISORDER, UNSPECIFIED TYPE: ICD-10-CM

## 2025-07-25 DIAGNOSIS — F33.1 MAJOR DEPRESSIVE DISORDER, RECURRENT EPISODE, MODERATE: Primary | ICD-10-CM

## 2025-07-25 DIAGNOSIS — F43.10 POST TRAUMATIC STRESS DISORDER (PTSD): ICD-10-CM
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
## 2025-07-28 ENCOUNTER — TRANSCRIBE ORDERS (OUTPATIENT)
Dept: ADMINISTRATIVE | Facility: HOSPITAL | Age: 55
End: 2025-07-28
Payer: MEDICAID

## 2025-07-28 DIAGNOSIS — H53.9 CHANGE IN VISION: Primary | ICD-10-CM

## 2025-07-28 DIAGNOSIS — D35.2 PITUITARY MACROADENOMA: ICD-10-CM

## 2025-07-28 DIAGNOSIS — S09.90XD INJURY OF HEAD, SUBSEQUENT ENCOUNTER: ICD-10-CM

## 2025-07-30 ENCOUNTER — PREP FOR SURGERY (OUTPATIENT)
Dept: OTHER | Facility: HOSPITAL | Age: 55
End: 2025-07-30
Payer: MEDICAID

## 2025-07-30 ENCOUNTER — TELEPHONE (OUTPATIENT)
Dept: GASTROENTEROLOGY | Facility: CLINIC | Age: 55
End: 2025-07-30
Payer: MEDICAID

## 2025-07-30 DIAGNOSIS — K92.1 BLOOD IN STOOL: Primary | ICD-10-CM

## 2025-07-30 DIAGNOSIS — K57.90 DIVERTICULOSIS: ICD-10-CM

## 2025-07-30 NOTE — TELEPHONE ENCOUNTER
X1-LVM FOR  TO CALL OFFICE TO SCHEDULE COLON PROCEDURE AT [facility] DUE TO BMI BEING OVER 50. CURRENTLY BOOKING OUT UNTIL NOVEMBER FOR HP CASES.

## 2025-07-30 NOTE — TELEPHONE ENCOUNTER
SPOKE TO PT SHE UNDERSTANDS [facility] POLICY FOR BMI AND IS OKAY WITH BEING SCHEDULED AT [facility] FOR PROCEDURE. CASE REQUEST HAS BEEN PLACED. PT WOULD LIKE TO BE SCHEDULED FOR 11/19/25 AT 1:30 FOR PROCEDURE. TOLD PT I WOULD MAIL HER INFORMATION ONCE ORDERS ARE SIGNED AND PROCEDURE HAS BEEN SCHEDULED. PT VOICED UNDERSTANDING.

## 2025-07-31 PROBLEM — K92.1 BLOOD IN STOOL: Status: ACTIVE | Noted: 2025-07-30

## 2025-07-31 RX ORDER — SODIUM CHLORIDE 0.9 % (FLUSH) 0.9 %
3 SYRINGE (ML) INJECTION EVERY 12 HOURS SCHEDULED
OUTPATIENT
Start: 2025-07-31

## 2025-07-31 RX ORDER — SODIUM CHLORIDE 0.9 % (FLUSH) 0.9 %
10 SYRINGE (ML) INJECTION AS NEEDED
OUTPATIENT
Start: 2025-07-31

## 2025-07-31 RX ORDER — SODIUM CHLORIDE 9 MG/ML
40 INJECTION, SOLUTION INTRAVENOUS AS NEEDED
OUTPATIENT
Start: 2025-07-31

## 2025-07-31 RX ORDER — DEXTROSE, SODIUM CHLORIDE, SODIUM LACTATE, POTASSIUM CHLORIDE, AND CALCIUM CHLORIDE 5; .6; .31; .03; .02 G/100ML; G/100ML; G/100ML; G/100ML; G/100ML
100 INJECTION, SOLUTION INTRAVENOUS CONTINUOUS
OUTPATIENT
Start: 2025-07-31 | End: 2025-07-31

## 2025-08-01 DIAGNOSIS — G47.01 INSOMNIA DUE TO MEDICAL CONDITION: ICD-10-CM

## 2025-08-01 RX ORDER — TRAZODONE HYDROCHLORIDE 50 MG/1
50 TABLET ORAL NIGHTLY
Qty: 30 TABLET | Refills: 2 | OUTPATIENT
Start: 2025-08-01

## 2025-08-04 ENCOUNTER — PATIENT OUTREACH (OUTPATIENT)
Age: 55
End: 2025-08-04
Payer: MEDICAID

## 2025-08-06 ENCOUNTER — TELEPHONE (OUTPATIENT)
Dept: PSYCHIATRY | Facility: CLINIC | Age: 55
End: 2025-08-06
Payer: MEDICAID

## 2025-08-06 DIAGNOSIS — I20.0 UNSTABLE ANGINA: ICD-10-CM

## 2025-08-06 DIAGNOSIS — G47.00 INSOMNIA, UNSPECIFIED TYPE: ICD-10-CM

## 2025-08-06 RX ORDER — TRAZODONE HYDROCHLORIDE 100 MG/1
100-200 TABLET ORAL NIGHTLY PRN
Qty: 60 TABLET | Refills: 0 | Status: SHIPPED | OUTPATIENT
Start: 2025-08-06

## 2025-08-06 RX ORDER — NITROGLYCERIN 400 UG/1
SPRAY ORAL
Qty: 4.9 G | Refills: 0 | Status: SHIPPED | OUTPATIENT
Start: 2025-08-06

## 2025-08-08 ENCOUNTER — TELEMEDICINE (OUTPATIENT)
Dept: PSYCHIATRY | Facility: CLINIC | Age: 55
End: 2025-08-08
Payer: MEDICAID

## 2025-08-08 DIAGNOSIS — F33.1 MAJOR DEPRESSIVE DISORDER, RECURRENT EPISODE, MODERATE: ICD-10-CM

## 2025-08-08 DIAGNOSIS — F41.9 ANXIETY DISORDER, UNSPECIFIED TYPE: ICD-10-CM

## 2025-08-08 DIAGNOSIS — G47.00 INSOMNIA, UNSPECIFIED TYPE: Primary | ICD-10-CM

## 2025-08-08 DIAGNOSIS — F43.10 POST TRAUMATIC STRESS DISORDER (PTSD): ICD-10-CM

## 2025-08-11 ENCOUNTER — PATIENT OUTREACH (OUTPATIENT)
Age: 55
End: 2025-08-11
Payer: MEDICAID

## 2025-08-19 DIAGNOSIS — F33.1 MAJOR DEPRESSIVE DISORDER, RECURRENT EPISODE, MODERATE: Chronic | ICD-10-CM

## 2025-08-19 DIAGNOSIS — F41.9 ANXIETY DISORDER, UNSPECIFIED TYPE: Chronic | ICD-10-CM

## 2025-08-19 RX ORDER — VORTIOXETINE 10 MG/1
10 TABLET, FILM COATED ORAL
Qty: 30 TABLET | Refills: 0 | Status: SHIPPED | OUTPATIENT
Start: 2025-08-19

## 2025-08-21 ENCOUNTER — TELEPHONE (OUTPATIENT)
Dept: PSYCHIATRY | Facility: CLINIC | Age: 55
End: 2025-08-21
Payer: MEDICAID

## 2025-08-26 ENCOUNTER — TELEMEDICINE (OUTPATIENT)
Dept: PSYCHIATRY | Facility: CLINIC | Age: 55
End: 2025-08-26
Payer: MEDICAID

## 2025-08-26 DIAGNOSIS — F33.1 MAJOR DEPRESSIVE DISORDER, RECURRENT EPISODE, MODERATE: Primary | ICD-10-CM

## 2025-08-26 DIAGNOSIS — F43.10 POST TRAUMATIC STRESS DISORDER (PTSD): ICD-10-CM

## 2025-08-26 DIAGNOSIS — F41.9 ANXIETY DISORDER, UNSPECIFIED TYPE: ICD-10-CM

## 2025-08-28 ENCOUNTER — TELEMEDICINE (OUTPATIENT)
Dept: FAMILY MEDICINE CLINIC | Facility: TELEHEALTH | Age: 55
End: 2025-08-28
Payer: MEDICAID

## 2025-08-28 DIAGNOSIS — B37.0 THRUSH: ICD-10-CM

## 2025-08-28 DIAGNOSIS — B37.31 VAGINAL YEAST INFECTION: Primary | ICD-10-CM

## 2025-08-28 PROBLEM — I50.30 (HFPEF) HEART FAILURE WITH PRESERVED EJECTION FRACTION: Chronic | Status: ACTIVE | Noted: 2025-08-18

## 2025-08-28 PROBLEM — E66.01 MORBID OBESITY WITH BODY MASS INDEX OF 60.0-69.9 IN ADULT: Chronic | Status: ACTIVE | Noted: 2025-08-18

## 2025-08-28 PROCEDURE — 99213 OFFICE O/P EST LOW 20 MIN: CPT | Performed by: NURSE PRACTITIONER

## 2025-08-28 PROCEDURE — 1160F RVW MEDS BY RX/DR IN RCRD: CPT | Performed by: NURSE PRACTITIONER

## 2025-08-28 PROCEDURE — 1159F MED LIST DOCD IN RCRD: CPT | Performed by: NURSE PRACTITIONER

## 2025-08-28 RX ORDER — FLUCONAZOLE 150 MG/1
150 TABLET ORAL
Qty: 2 TABLET | Refills: 0 | Status: SHIPPED | OUTPATIENT
Start: 2025-08-28 | End: 2025-09-01

## 2025-08-28 RX ORDER — HYDRALAZINE HYDROCHLORIDE 10 MG/1
TABLET, FILM COATED ORAL
COMMUNITY
Start: 2025-08-22

## (undated) DEVICE — THE SINGLE USE ETRAP – POLYP TRAP IS USED FOR SUCTION RETRIEVAL OF ENDOSCOPICALLY REMOVED POLYPS.: Brand: ETRAP

## (undated) DEVICE — SINGLE-USE POLYPECTOMY SNARE: Brand: SENSATION SHORT THROW

## (undated) DEVICE — "MH-438 A/W VLVE F/140 EVIS-140": Brand: AIR/WATER VALVE

## (undated) DEVICE — "MH-443 SUCTION VALVE F/EVIS140 EVIS160": Brand: SUCTION VALVE

## (undated) DEVICE — ENDOGATOR HYBRID TUBING KIT FOR USE WITH ENDOGATOR IRRIGATION PUMP, OLYMPUS PUMP, GI4000 ESU, AND TORRENT IRRIGATION PUMP.: Brand: ENDOGATOR KIT

## (undated) DEVICE — Device: Brand: AIR/WATER CHANNEL CLEANING ADAPTER

## (undated) DEVICE — CONTN GRAD MEAS TRIANG 32OZ BLK

## (undated) DEVICE — SYR LUERLOK 50ML